# Patient Record
Sex: MALE | Race: WHITE | NOT HISPANIC OR LATINO | Employment: OTHER | URBAN - METROPOLITAN AREA
[De-identification: names, ages, dates, MRNs, and addresses within clinical notes are randomized per-mention and may not be internally consistent; named-entity substitution may affect disease eponyms.]

---

## 2017-01-17 ENCOUNTER — OFFICE VISIT (OUTPATIENT)
Dept: MEDICAL GROUP | Age: 72
End: 2017-01-17
Payer: MEDICARE

## 2017-01-17 VITALS
RESPIRATION RATE: 16 BRPM | BODY MASS INDEX: 19.35 KG/M2 | SYSTOLIC BLOOD PRESSURE: 124 MMHG | HEART RATE: 80 BPM | HEIGHT: 75 IN | TEMPERATURE: 98.1 F | WEIGHT: 155.6 LBS | DIASTOLIC BLOOD PRESSURE: 70 MMHG | OXYGEN SATURATION: 98 %

## 2017-01-17 DIAGNOSIS — E78.2 MIXED HYPERLIPIDEMIA: ICD-10-CM

## 2017-01-17 DIAGNOSIS — F17.200 TOBACCO DEPENDENCE: ICD-10-CM

## 2017-01-17 DIAGNOSIS — M15.9 PRIMARY OSTEOARTHRITIS INVOLVING MULTIPLE JOINTS: ICD-10-CM

## 2017-01-17 DIAGNOSIS — R73.01 IFG (IMPAIRED FASTING GLUCOSE): ICD-10-CM

## 2017-01-17 PROCEDURE — 99214 OFFICE O/P EST MOD 30 MIN: CPT | Performed by: INTERNAL MEDICINE

## 2017-01-17 ASSESSMENT — ENCOUNTER SYMPTOMS
PSYCHIATRIC NEGATIVE: 1
EYES NEGATIVE: 1
NEUROLOGICAL NEGATIVE: 1
CARDIOVASCULAR NEGATIVE: 1
GASTROINTESTINAL NEGATIVE: 1
RESPIRATORY NEGATIVE: 1
CONSTITUTIONAL NEGATIVE: 1

## 2017-01-18 NOTE — PROGRESS NOTES
Subjective:      Alvaro Metcalf Jr. is a 71 y.o. male who presents with Arthritis  The patient is here for followup of chronic medical problems listed below. The patient is compliant with medications and having no side effects from them. Denies chest pain, abdominal pain, dyspnea, myalgias, or cough.   Patient Active Problem List    Diagnosis Date Noted   • Primary osteoarthritis involving multiple joints 01/17/2017   • Tobacco dependence 01/17/2017   • Smoking greater than 30 pack years (2/3 pack a day for 52 yrs= 34 pk yrs) 10/17/2016   • IFG (impaired fasting glucose) 04/12/2016   • Vitamin D deficiency 04/12/2016   • Cervical spondylosis- dr white 02/25/2016   • Mixed hyperlipidemia 09/14/2015   • Insomnia 09/14/2015   • Empty sella syndrome (CMS-HCC) 10/30/2013   • Hypogonadism male 10/30/2013   • Burning sensation 10/07/2013   • S/P cervical spinal fusion 10/07/2013   • Cerebral palsy (CMS-HCC) 08/28/2012   • Chronic neck pain- S/P cervical spinal fusion- followed by pain mgt- dr white 07/20/2012     Allergies   Allergen Reactions   • Tetanus Toxoid Anaphylaxis   • Crestor [Rosuvastatin]      Gi upset and myalgias     Outpatient Prescriptions Prior to Visit   Medication Sig Dispense Refill   • atorvastatin (LIPITOR) 40 MG Tab Take 1 Tab by mouth every 48 hours. 45 Tab 4   • amitriptyline (ELAVIL) 50 MG Tab One bid. 180 Tab 3   • pregabalin (LYRICA) 100 MG Cap Take 1 Cap by mouth 2 times a day. 180 Cap 3   • methocarbamol (ROBAXIN) 750 MG TABS Take 1 Tab by mouth 3 times a day. 90 Tab 3   • lorazepam (ATIVAN) 1 MG TABS Take 1 Tab by mouth 2 Times a Day. One bid. 60 Tab 3   • hydrocodone/acetaminophen (NORCO)  MG TABS Take 1 Tab by mouth every 8 hours as needed. May fill on or after 7/23/2015. 90 Tab 0   • aspirin (ASA) 325 MG TABS Take 325 mg by mouth every day.     • amoxicillin-clavulanate (AUGMENTIN) 875-125 MG Tab Take 1 Tab by mouth 2 times a day. 30 Tab 1   • baclofen (LIORESAL) 10 MG Tab  "       No facility-administered medications prior to visit.              Arthritis        Review of Systems   Constitutional: Negative.    HENT: Negative.    Eyes: Negative.    Respiratory: Negative.    Cardiovascular: Negative.    Gastrointestinal: Negative.    Genitourinary: Negative.    Musculoskeletal: Positive for arthritis.   Skin: Negative.    Neurological: Negative.    Endo/Heme/Allergies: Negative.    Psychiatric/Behavioral: Negative.           Objective:     /70 mmHg  Pulse 80  Temp(Src) 36.7 °C (98.1 °F)  Resp 16  Ht 1.905 m (6' 3\")  Wt 70.58 kg (155 lb 9.6 oz)  BMI 19.45 kg/m2  SpO2 98%     Physical Exam   Constitutional: He is oriented to person, place, and time. He appears well-developed and well-nourished. No distress.   HENT:   Head: Normocephalic and atraumatic.   Right Ear: External ear normal.   Left Ear: External ear normal.   Mouth/Throat: Oropharynx is clear and moist. No oropharyngeal exudate.   Eyes: Conjunctivae and EOM are normal. Pupils are equal, round, and reactive to light. Right eye exhibits no discharge.   Neck: Normal range of motion. Neck supple. No JVD present. No tracheal deviation present. No thyromegaly present.   Cardiovascular: Normal rate, regular rhythm, normal heart sounds and intact distal pulses.  Exam reveals no gallop.    Pulmonary/Chest: Effort normal and breath sounds normal. No respiratory distress. He has no wheezes. He has no rales. He exhibits no tenderness.   Abdominal: Soft. Bowel sounds are normal. He exhibits no distension and no mass. There is no tenderness. There is no rebound and no guarding. No hernia.   Genitourinary: Guaiac negative stool. No penile tenderness.   Musculoskeletal: He exhibits no edema or tenderness.   Lymphadenopathy:     He has no cervical adenopathy.   Neurological: He is alert and oriented to person, place, and time. He has normal reflexes. He displays normal reflexes. No cranial nerve deficit. He exhibits normal muscle " tone. Coordination normal.   Skin: Skin is warm and dry. No rash noted. He is not diaphoretic. No erythema. No pallor.   Psychiatric: He has a normal mood and affect. His behavior is normal. Judgment and thought content normal.   Nursing note and vitals reviewed.  No visits with results within 1 Month(s) from this visit.  Latest known visit with results is:    Hospital Outpatient Visit on 10/11/2016   Component Date Value   • Sodium 10/11/2016 140    • Potassium 10/11/2016 4.1    • Chloride 10/11/2016 106    • Co2 10/11/2016 27    • Anion Gap 10/11/2016 7.0    • Glucose 10/11/2016 105*   • Bun 10/11/2016 26*   • Creatinine 10/11/2016 0.88    • Calcium 10/11/2016 9.2    • AST(SGOT) 10/11/2016 27    • ALT(SGPT) 10/11/2016 19    • Alkaline Phosphatase 10/11/2016 56    • Total Bilirubin 10/11/2016 0.8    • Albumin 10/11/2016 4.2    • Total Protein 10/11/2016 7.3    • Globulin 10/11/2016 3.1    • A-G Ratio 10/11/2016 1.4    • Cholesterol,Tot 10/11/2016 136    • Triglycerides 10/11/2016 202*   • HDL 10/11/2016 39*   • LDL 10/11/2016 57    • WBC 10/11/2016 6.1    • RBC 10/11/2016 5.20    • Hemoglobin 10/11/2016 16.0    • Hematocrit 10/11/2016 47.8    • MCV 10/11/2016 91.9    • MCH 10/11/2016 30.8    • MCHC 10/11/2016 33.5*   • RDW 10/11/2016 43.5    • Platelet Count 10/11/2016 153*   • MPV 10/11/2016 10.2    • Neutrophils-Polys 10/11/2016 60.70    • Lymphocytes 10/11/2016 26.90    • Monocytes 10/11/2016 7.60    • Eosinophils 10/11/2016 3.10    • Basophils 10/11/2016 1.20    • Immature Granulocytes 10/11/2016 0.50    • Nucleated RBC 10/11/2016 0.00    • Neutrophils (Absolute) 10/11/2016 3.69    • Lymphs (Absolute) 10/11/2016 1.63    • Monos (Absolute) 10/11/2016 0.46    • Eos (Absolute) 10/11/2016 0.19    • Baso (Absolute) 10/11/2016 0.07    • Immature Granulocytes (a* 10/11/2016 0.03    • NRBC (Absolute) 10/11/2016 0.00    • 25-Hydroxy   Vitamin D 25 10/11/2016 41    • GFR If  10/11/2016 >60    • GFR If Non   [employer]i* 10/11/2016 >60       Lab Results   Component Value Date/Time    GLYCOHEMOGLOBIN 5.8* 06/11/2015 11:13 AM    GLYCOHEMOGLOBIN 5.8* 03/05/2014 08:34 AM     Lab Results   Component Value Date/Time    SODIUM 140 10/11/2016 11:17 AM    POTASSIUM 4.1 10/11/2016 11:17 AM    CHLORIDE 106 10/11/2016 11:17 AM    CO2 27 10/11/2016 11:17 AM    GLUCOSE 105* 10/11/2016 11:17 AM    BUN 26* 10/11/2016 11:17 AM    CREATININE 0.88 10/11/2016 11:17 AM    BUN-CREATININE RATIO 19 03/07/2011 12:00 AM    GLOM FILT RATE, EST >59 03/07/2011 12:00 AM    ALKALINE PHOSPHATASE 56 10/11/2016 11:17 AM    AST(SGOT) 27 10/11/2016 11:17 AM    ALT(SGPT) 19 10/11/2016 11:17 AM    TOTAL BILIRUBIN 0.8 10/11/2016 11:17 AM     Lab Results   Component Value Date/Time    INR 1.02 05/04/2010 02:28 PM    INR 1.06 11/25/2009 05:45 AM     Lab Results   Component Value Date/Time    CHOLESTEROL, 10/11/2016 11:17 AM    LDL 57 10/11/2016 11:17 AM    HDL 39* 10/11/2016 11:17 AM    TRIGLYCERIDES 202* 10/11/2016 11:17 AM       Lab Results   Component Value Date/Time    TESTOSTERONE,TOTAL 285* 03/05/2014 08:34 AM     No results found for: TSH  Lab Results   Component Value Date/Time    FREE T-4 0.82 11/07/2013 07:19 AM    FREE T-4 0.71 11/25/2009 11:15 AM     No results found for: URICACID  No components found for: VITB12  Lab Results   Component Value Date/Time    25-HYDROXY   VITAMIN D 25 41 10/11/2016 11:22 AM    25-HYDROXY   VITAMIN D 25 36 05/25/2012 09:40 AM                  Assessment/Plan:     1. Primary osteoarthritis involving multiple joints    Under good control. Continue same regimen.    2. Mixed hyperlipidemia    Under good control. Continue same regimen.  3. IFG (impaired fasting glucose)     Under good control. Continue same regimen.    4. Tobacco dependence     Patient counseled. Encouraged to quit tobacco products. NicoDerm prescribed.    30 minute face-to-face encounter took place today.  More than half of this time was spent in  the coordination of care of the above problems, as well as counseling.

## 2017-03-27 ENCOUNTER — PATIENT OUTREACH (OUTPATIENT)
Dept: HEALTH INFORMATION MANAGEMENT | Facility: OTHER | Age: 72
End: 2017-03-27

## 2017-04-05 NOTE — PROGRESS NOTES
Attempt #:3    Verify PCP: yes    Communication Preference Obtained: yes     Review Care Team: yes    Annual Wellness Visit Scheduling  1. Scheduling Status:Not Scheduled. Patient states they are not interested          Care Gap Scheduling (Attempt to Schedule EACH Overdue Care Gap!)     Health Maintenance Due   Topic Date Due   • IMM ZOSTER VACCINE  DIDN'T GET TO DISCUSS WITH PT          MyChart Activation: declined  MyChart Shiva: no  Virtual Visits: no  Opt In to Text Messages: no

## 2017-04-06 NOTE — PROGRESS NOTES
Attempt #:4    Verify PCP: yes    Communication Preference Obtained: yes     Review Care Team: yes    Annual Wellness Visit Scheduling  1. Scheduling Status:Scheduled          Care Gap Scheduling (Attempt to Schedule EACH Overdue Care Gap!)     Health Maintenance Due   Topic Date Due   • IMM ZOSTER VACCINE  PT WOULD LIKE TO DISCUSS WITH PCP         Darryl Activation: declined  MyChart Shiva: no  Virtual Visits: no  Opt In to Text Messages: no

## 2017-04-20 ENCOUNTER — TELEPHONE (OUTPATIENT)
Dept: MEDICAL GROUP | Age: 72
End: 2017-04-20

## 2017-04-20 NOTE — TELEPHONE ENCOUNTER
ANNUAL WELLNESS VISIT PRE-VISIT PLANNING     1.  Reviewed note from last office visit with PCP: YES    2.  If any orders were placed at last visit, do we have Results/Consult Notes?        •  Labs - Labs ordered, completed and results are in chart.       •  Imaging - Imaging ordered, completed and results are in chart.       •  Referrals - Referral ordered, patient was seen and consult notes are in chart. Care Teams updated  YES.    3.  Immunizations were updated in [facility] using WebIZ?: Yes       •  WebIZ Recommendations: HEPATITIS A , HEPATITIS B, TDAP and ZOSTAVAX (Shingles)       •  Is patient due for Tdap? YES. Patient was notified of copay.       •  Is patient due for Shingles? YES. Patient was notified of copay.     4.  Patient is due for the following Health Maintenance Topics:   Health Maintenance Due   Topic Date Due   • IMM ZOSTER VACCINE  04/12/2004   • IMM PNEUMOCOCCAL 65+ (ADULT) LOW/MEDIUM RISK SERIES (2 of 2 - PPSV23) 04/12/2017   • Annual Wellness Visit  04/13/2017       - Patient has completed FLU, PNEUMOVAX (PPSV23) and PREVNAR (PCV13)  Immunization(s) per WebIZ. Chart has been updated.    5.  Reviewed/Updated the following with patient:       •   Preferred Pharmacy? YES       •   Preferred Lab? YES       •   Medications? YES. Was Abstract Encounter opened and chart updated? YES       •   Social History? YES. Was Abstract Encounter opened and chart updated? YES       •   Family History? YES. Was Abstract Encounter opened and chart updated? YES    6.  Care Team Updated:       •   DME Company (gait device, O2, CPAP, etc.): N\A       •   Other Specialists (eye doctor, derm, GYN, cardiology, endo, etc): YES    7.  Patient has the following Care Path diagnoses on Problem List:      8.  Specialty Comments was updated with diagnosis information provided by [facility]: YES    9.  Patient was advised: “This is a free wellness visit. The provider will screen for medical conditions to help you stay healthy. If you  have other concerns to address you may be asked to discuss these at a separate visit or there may be an additional fee.”     10.  Patient was informed to arrive 15 min prior to their scheduled appointment and bring in their medication bottles?  YES

## 2017-04-24 ENCOUNTER — HOSPITAL ENCOUNTER (OUTPATIENT)
Dept: LAB | Facility: MEDICAL CENTER | Age: 72
End: 2017-04-24
Attending: INTERNAL MEDICINE
Payer: MEDICARE

## 2017-04-24 DIAGNOSIS — E78.2 MIXED HYPERLIPIDEMIA: ICD-10-CM

## 2017-04-24 LAB
ALBUMIN SERPL BCP-MCNC: 4.1 G/DL (ref 3.2–4.9)
ALBUMIN/GLOB SERPL: 1.5 G/DL
ALP SERPL-CCNC: 61 U/L (ref 30–99)
ALT SERPL-CCNC: 16 U/L (ref 2–50)
ANION GAP SERPL CALC-SCNC: 6 MMOL/L (ref 0–11.9)
AST SERPL-CCNC: 22 U/L (ref 12–45)
BASOPHILS # BLD AUTO: 1 % (ref 0–1.8)
BASOPHILS # BLD: 0.07 K/UL (ref 0–0.12)
BILIRUB SERPL-MCNC: 0.8 MG/DL (ref 0.1–1.5)
BUN SERPL-MCNC: 29 MG/DL (ref 8–22)
CALCIUM SERPL-MCNC: 8.8 MG/DL (ref 8.5–10.5)
CHLORIDE SERPL-SCNC: 106 MMOL/L (ref 96–112)
CHOLEST SERPL-MCNC: 149 MG/DL (ref 100–199)
CO2 SERPL-SCNC: 26 MMOL/L (ref 20–33)
CREAT SERPL-MCNC: 0.82 MG/DL (ref 0.5–1.4)
EOSINOPHIL # BLD AUTO: 0.23 K/UL (ref 0–0.51)
EOSINOPHIL NFR BLD: 3.2 % (ref 0–6.9)
ERYTHROCYTE [DISTWIDTH] IN BLOOD BY AUTOMATED COUNT: 42.5 FL (ref 35.9–50)
GFR SERPL CREATININE-BSD FRML MDRD: >60 ML/MIN/1.73 M 2
GLOBULIN SER CALC-MCNC: 2.8 G/DL (ref 1.9–3.5)
GLUCOSE SERPL-MCNC: 104 MG/DL (ref 65–99)
HCT VFR BLD AUTO: 46.6 % (ref 42–52)
HDLC SERPL-MCNC: 41 MG/DL
HGB BLD-MCNC: 15.7 G/DL (ref 14–18)
IMM GRANULOCYTES # BLD AUTO: 0.02 K/UL (ref 0–0.11)
IMM GRANULOCYTES NFR BLD AUTO: 0.3 % (ref 0–0.9)
LDLC SERPL CALC-MCNC: 64 MG/DL
LYMPHOCYTES # BLD AUTO: 1.94 K/UL (ref 1–4.8)
LYMPHOCYTES NFR BLD: 27.2 % (ref 22–41)
MCH RBC QN AUTO: 30 PG (ref 27–33)
MCHC RBC AUTO-ENTMCNC: 33.7 G/DL (ref 33.7–35.3)
MCV RBC AUTO: 88.9 FL (ref 81.4–97.8)
MONOCYTES # BLD AUTO: 0.54 K/UL (ref 0–0.85)
MONOCYTES NFR BLD AUTO: 7.6 % (ref 0–13.4)
NEUTROPHILS # BLD AUTO: 4.32 K/UL (ref 1.82–7.42)
NEUTROPHILS NFR BLD: 60.7 % (ref 44–72)
NRBC # BLD AUTO: 0 K/UL
NRBC BLD AUTO-RTO: 0 /100 WBC
PLATELET # BLD AUTO: 156 K/UL (ref 164–446)
PMV BLD AUTO: 10.5 FL (ref 9–12.9)
POTASSIUM SERPL-SCNC: 3.8 MMOL/L (ref 3.6–5.5)
PROT SERPL-MCNC: 6.9 G/DL (ref 6–8.2)
RBC # BLD AUTO: 5.24 M/UL (ref 4.7–6.1)
SODIUM SERPL-SCNC: 138 MMOL/L (ref 135–145)
TRIGL SERPL-MCNC: 220 MG/DL (ref 0–149)
WBC # BLD AUTO: 7.1 K/UL (ref 4.8–10.8)

## 2017-04-24 PROCEDURE — 80053 COMPREHEN METABOLIC PANEL: CPT

## 2017-04-24 PROCEDURE — 85025 COMPLETE CBC W/AUTO DIFF WBC: CPT

## 2017-04-24 PROCEDURE — 36415 COLL VENOUS BLD VENIPUNCTURE: CPT

## 2017-04-24 PROCEDURE — 80061 LIPID PANEL: CPT

## 2017-04-27 ENCOUNTER — OFFICE VISIT (OUTPATIENT)
Dept: MEDICAL GROUP | Age: 72
End: 2017-04-27
Payer: MEDICARE

## 2017-04-27 VITALS
OXYGEN SATURATION: 95 % | WEIGHT: 155 LBS | HEIGHT: 75 IN | TEMPERATURE: 97.4 F | BODY MASS INDEX: 19.27 KG/M2 | SYSTOLIC BLOOD PRESSURE: 118 MMHG | HEART RATE: 69 BPM | DIASTOLIC BLOOD PRESSURE: 78 MMHG

## 2017-04-27 DIAGNOSIS — R20.8 BURNING SENSATION: ICD-10-CM

## 2017-04-27 DIAGNOSIS — F17.200 TOBACCO DEPENDENCE: ICD-10-CM

## 2017-04-27 DIAGNOSIS — M15.9 PRIMARY OSTEOARTHRITIS INVOLVING MULTIPLE JOINTS: ICD-10-CM

## 2017-04-27 DIAGNOSIS — R73.01 IFG (IMPAIRED FASTING GLUCOSE): ICD-10-CM

## 2017-04-27 DIAGNOSIS — Z23 NEED FOR VACCINATION: ICD-10-CM

## 2017-04-27 DIAGNOSIS — F17.210 SMOKING GREATER THAN 30 PACK YEARS: ICD-10-CM

## 2017-04-27 DIAGNOSIS — G89.29 CHRONIC NECK PAIN: ICD-10-CM

## 2017-04-27 DIAGNOSIS — G80.9 CEREBRAL PALSY, UNSPECIFIED (HCC): ICD-10-CM

## 2017-04-27 DIAGNOSIS — E78.2 MIXED HYPERLIPIDEMIA: ICD-10-CM

## 2017-04-27 DIAGNOSIS — M47.812 SPONDYLOSIS OF CERVICAL REGION WITHOUT MYELOPATHY OR RADICULOPATHY: ICD-10-CM

## 2017-04-27 DIAGNOSIS — Z00.00 MEDICARE ANNUAL WELLNESS VISIT, SUBSEQUENT: ICD-10-CM

## 2017-04-27 DIAGNOSIS — E55.9 VITAMIN D DEFICIENCY: ICD-10-CM

## 2017-04-27 DIAGNOSIS — F51.01 PRIMARY INSOMNIA: ICD-10-CM

## 2017-04-27 DIAGNOSIS — E29.1 HYPOGONADISM MALE: ICD-10-CM

## 2017-04-27 DIAGNOSIS — Z98.1 S/P CERVICAL SPINAL FUSION: ICD-10-CM

## 2017-04-27 DIAGNOSIS — E23.6 EMPTY SELLA SYNDROME (HCC): ICD-10-CM

## 2017-04-27 DIAGNOSIS — M54.2 CHRONIC NECK PAIN: ICD-10-CM

## 2017-04-27 PROCEDURE — G0009 ADMIN PNEUMOCOCCAL VACCINE: HCPCS | Performed by: INTERNAL MEDICINE

## 2017-04-27 PROCEDURE — G0439 PPPS, SUBSEQ VISIT: HCPCS | Mod: 25 | Performed by: INTERNAL MEDICINE

## 2017-04-27 PROCEDURE — 90732 PPSV23 VACC 2 YRS+ SUBQ/IM: CPT | Performed by: INTERNAL MEDICINE

## 2017-04-27 RX ORDER — ZOLPIDEM TARTRATE 10 MG/1
TABLET ORAL
COMMUNITY
Start: 2017-04-15 | End: 2020-01-01

## 2017-04-27 ASSESSMENT — PATIENT HEALTH QUESTIONNAIRE - PHQ9: CLINICAL INTERPRETATION OF PHQ2 SCORE: 0

## 2017-04-27 NOTE — MR AVS SNAPSHOT
"        Alvaro Metcalf JrMadeleine   2017 11:00 AM   Office Visit   MRN: [id number]    Department:  [address] [place] [address]   Dept Phone:  [phone]    Description:  Male : 1945   Provider:  Chris Hinkle M.D.; [facility]            Reason for Visit     Annual Wellness Visit           Allergies as of 2017     Allergen Noted Reactions    Tetanus Toxoid 2009   Anaphylaxis    Crestor [Rosuvastatin] 2015       Gi upset and myalgias      You were diagnosed with     Medicare annual wellness visit, subsequent   [384286]       Need for vaccination   [617252]       Chronic neck pain   [534849]       Cerebral palsy, unspecified (CMS-HCC)   [7683052]       Burning sensation   [772990]       S/P cervical spinal fusion   [532876]       Empty sella syndrome (CMS-HCC)   [831837]       Hypogonadism male   [087959]       Mixed hyperlipidemia   [272.2.ICD-9-CM]       Primary insomnia   [708469]       Spondylosis of cervical region without myelopathy or radiculopathy   [566554]       IFG (impaired fasting glucose)   [156802]       Vitamin D deficiency   [5228491]       Smoking greater than 30 pack years   [989741]       Primary osteoarthritis involving multiple joints   [3296671]       Tobacco dependence   [661104]         Vital Signs     Blood Pressure Pulse Temperature Height Weight Body Mass Index    118/78 mmHg 69 36.3 °C (97.4 °F) 1.905 m (6' 3\") 70.308 kg (155 lb) 19.37 kg/m2    Oxygen Saturation Smoking Status                95% Current Every Day Smoker          Basic Information     Date Of Birth Sex Race Ethnicity Preferred Language    1945 Male White Non- English      Your appointments     Oct 26, 2017  1:00 PM   Urgent/Same Day with Chris Hinkle M.D.   [address] [address] [phone]           You will be receiving a confirmation call a few days before your appointment from our automated call " confirmation system.              Problem List              ICD-10-CM Priority Class Noted - Resolved    Chronic neck pain- S/P cervical spinal fusion- followed by pain mgt- dr white M54.2, G89.29   7/20/2012 - Present    Cerebral palsy, unspecified (CMS-HCC) G80.9   8/28/2012 - Present    Burning sensation R20.8   10/7/2013 - Present    S/P cervical spinal fusion Z98.1   10/7/2013 - Present    Empty sella syndrome (CMS-HCC) E23.6   10/30/2013 - Present    Hypogonadism male E29.1   10/30/2013 - Present    Mixed hyperlipidemia E78.2   9/14/2015 - Present    Primary insomnia F51.01   9/14/2015 - Present    Spondylosis of cervical region without myelopathy or radiculopathy M47.812   2/25/2016 - Present    IFG (impaired fasting glucose) R73.01   4/12/2016 - Present    Vitamin D deficiency E55.9   4/12/2016 - Present    Smoking greater than 30 pack years (2/3 pack a day for 52 yrs= 34 pk yrs) F17.210   10/17/2016 - Present    Primary osteoarthritis involving multiple joints M15.0   1/17/2017 - Present    Tobacco dependence F17.200   1/17/2017 - Present      Health Maintenance        Date Due Completion Dates    IMM ZOSTER VACCINE 4/12/2004 ---    IMM PNEUMOCOCCAL 65+ (ADULT) LOW/MEDIUM RISK SERIES (2 of 2 - PPSV23) 4/12/2017 4/12/2016    COLONOSCOPY 1/21/2019 1/21/2014            Current Immunizations     13-VALENT PCV PREVNAR 4/12/2016    Influenza Vaccine Adult HD 9/19/2016, 9/22/2015    Influenza Vaccine Quad Inj (Pf) 10/6/2014  2:39 PM    Pneumococcal polysaccharide vaccine (PPSV-23) 4/27/2017 12:12 PM      Below and/or attached are the medications your provider expects you to take. Review all of your home medications and newly ordered medications with your provider and/or pharmacist. Follow medication instructions as directed by your provider and/or pharmacist. Please keep your medication list with you and share with your provider. Update the information when medications are discontinued, doses are changed, or new  medications (including over-the-counter products) are added; and carry medication information at all times in the event of emergency situations     Allergies:  TETANUS TOXOID - Anaphylaxis     CRESTOR - (reactions not documented)               Medications  Valid as of: April 27, 2017 -  2:47 PM    Generic Name Brand Name Tablet Size Instructions for use    Amitriptyline HCl (Tab) ELAVIL 50 MG One bid.        Aspirin (Tab)  MG Take 325 mg by mouth every day.        Atorvastatin Calcium (Tab) LIPITOR 40 MG Take 1 Tab by mouth every 48 hours.        Baclofen (Tab) LIORESAL 10 MG         Hydrocodone-Acetaminophen (Tab) NORCO  MG Take 1 Tab by mouth every 8 hours as needed. May fill on or after 7/23/2015.        LORazepam (Tab) ATIVAN 1 MG Take 1 Tab by mouth 2 Times a Day. One bid.        Methocarbamol (Tab) ROBAXIN 750 MG Take 1 Tab by mouth 3 times a day.        Pregabalin (Cap) LYRICA 100 MG Take 1 Cap by mouth 2 times a day.        Zolpidem Tartrate (Tab) AMBIEN 10 MG         .                 Medicines prescribed today were sent to:     [id number] - [place], [place] - [address]    [address] [place] [address]    Phone: [phone] Fax: [phone]    Open 24 Hours?: No      Medication refill instructions:       If your prescription bottle indicates you have medication refills left, it is not necessary to call your provider’s office. Please contact your pharmacy and they will refill your medication.    If your prescription bottle indicates you do not have any refills left, you may request refills at any time through one of the following ways: The online [employer] system (except Urgent Care), by calling your provider’s office, or by asking your pharmacy to contact your provider’s office with a refill request. Medication refills are processed only during regular business hours and may not be available until the next business day. Your provider may request additional information or to have  a follow-up visit with you prior to refilling your medication.   *Please Note: Medication refills are assigned a new Rx number when refilled electronically. Your pharmacy may indicate that no refills were authorized even though a new prescription for the same medication is available at the pharmacy. Please request the medicine by name with the pharmacy before contacting your provider for a refill.        Other Notes About Your Plan     G80.9 Cerebral Palsy, E23.7 Empty Sella Syndrome  Query: If BMI<18 please consider dx of Cachexia           MyChart Status: Patient Declined        Quit Tobacco Information     Do you want to quit using tobacco?    Quitting tobacco decreases risks of cancer, heart and lung disease, increases life expectancy, improves sense of taste and smell, and increases spending money, among other benefits.    If you are thinking about quitting, we can help.  • [employer] Quit Tobacco Program: [phone]  o Program occurs weekly for four weeks and includes pharmacist consultation on products to support quitting smoking or chewing tobacco. A provider referral is needed for pharmacist consultation.  • Tobacco Users Help Hotline: 5-206-QUITNOW (305-2007) or https://[place].quitlogix.org/  o Free, confidential telephone and online coaching for [place] residents. Sessions are designed on a schedule that is convenient for you. Eligible clients receive free nicotine replacement therapy.  • Nationally: www.smokefree.gov  o Information and professional assistance to support both immediate and long-term needs as you become, and remain, a non-smoker. Smokefree.gov allows you to choose the help that best fits your needs.

## 2017-04-27 NOTE — PROGRESS NOTES
Chief Complaint   Patient presents with   • Annual Wellness Visit         HPI:  Alvaro Metcalf Jr. is a 72 y.o. male here for Medicare Annual Wellness Visit         Patient Active Problem List    Diagnosis Date Noted   • Primary osteoarthritis involving multiple joints 01/17/2017   • Tobacco dependence 01/17/2017   • Smoking greater than 30 pack years (2/3 pack a day for 52 yrs= 34 pk yrs) 10/17/2016   • IFG (impaired fasting glucose) 04/12/2016   • Vitamin D deficiency 04/12/2016   • Cervical spondylosis- dr white 02/25/2016   • Mixed hyperlipidemia 09/14/2015   • Insomnia 09/14/2015   • Empty sella syndrome (CMS-HCC) 10/30/2013   • Hypogonadism male 10/30/2013   • Burning sensation 10/07/2013   • S/P cervical spinal fusion 10/07/2013   • Cerebral palsy (CMS-HCC) 08/28/2012   • Chronic neck pain- S/P cervical spinal fusion- followed by pain mgt- dr white 07/20/2012       Current Outpatient Prescriptions   Medication Sig Dispense Refill   • zolpidem (AMBIEN) 10 MG Tab      • atorvastatin (LIPITOR) 40 MG Tab Take 1 Tab by mouth every 48 hours. 45 Tab 4   • amitriptyline (ELAVIL) 50 MG Tab One bid. 180 Tab 3   • pregabalin (LYRICA) 100 MG Cap Take 1 Cap by mouth 2 times a day. 180 Cap 3   • methocarbamol (ROBAXIN) 750 MG TABS Take 1 Tab by mouth 3 times a day. 90 Tab 3   • lorazepam (ATIVAN) 1 MG TABS Take 1 Tab by mouth 2 Times a Day. One bid. 60 Tab 3   • hydrocodone/acetaminophen (NORCO)  MG TABS Take 1 Tab by mouth every 8 hours as needed. May fill on or after 7/23/2015. 90 Tab 0   • aspirin (ASA) 325 MG TABS Take 325 mg by mouth every day.     • baclofen (LIORESAL) 10 MG Tab        No current facility-administered medications for this visit.        Patient is taking medications as noted in medication list.  Current supplements as per medication list.   Chronic narcotic pain medicines: no    Allergies: Tetanus toxoid and Crestor    Current social contact/activities: Sports fan, going to [employer] 3  day a week, movies once a week.       Is patient current with immunizations?  No, due for PNEUMOVAX (PPSV23) and ZOSTAVAX (Shingles). Patient is interested in receiving PNEUMOVAX (PPSV23) today.     He  reports that he has been smoking Cigarettes.  He has a 40.5 pack-year smoking history. He has never used smokeless tobacco. He reports that he drinks alcohol. He reports that he does not use illicit drugs.  Ready to quit: No  Counseling given: Yes        DPA/Advanced Directive:  Patient has Durable Power of , but it is not on file. Instructed to bring in a copy to scan into their chart.    ROS:    Gait: Uses no assistive device    Ostomy: no    Other tubes: no    Amputations: no    Chronic oxygen use: no    Last eye exam: 8/2016    Wears hearing aids: no    : Denies incontinence.        Depression Screening    Little interest or pleasure in doing things?  0 - not at all  Feeling down, depressed, or hopeless?  0 - not at all  Patient Health Questionnaire Score: 0  If depressive symptoms identified deferred to follow up visit unless specifically addressed in assessment and plan.    Screening for Cognitive Impairment    Three Minute Recall (banana, sunrise, fence)  3/3 Apple, magen, table  Draw clock face with all 12 numbers set to the hand to show 10 minutes past 11 o'clock  1 5/5  If cognitive concerns identified deferred to follow up visit unless specifically addressed in assessment and plan.    Fall Risk Assessment    Has the patient had two or more falls in the last year or any fall with injury in the last year?  No  If Fall Risk identified deferred to follow up visit unless specifically addressed in assessment and plan.    Safety Assessment    Throw rugs on floor.  Yes  Handrails on all stairs.  Yes  Good lighting in all hallways.  Yes  Difficulty hearing.  No  Patient counseled about all safety risks that were identified.    Functional Assessment ADLs    Are there any barriers preventing you from  cooking for yourself or meeting nutritional needs?  No.    Are there any barriers preventing you from driving safely or obtaining transportation?  No.    Are there any barriers preventing you from using a telephone or calling for help?  No.    Are there any barriers preventing you from shopping?  No.    Are there any barriers preventing you from taking care of your own finances?  No.    Are there any barriers preventing you from managing your medications?  No.    Are currently engaging any exercise or physical activity?  Yes.  Walking 3 times a week.    Health Maintenance Summary                IMM ZOSTER VACCINE Overdue 4/12/2004     IMM PNEUMOCOCCAL 65+ (ADULT) LOW/MEDIUM RISK SERIES Overdue 4/12/2017      Done 4/12/2016 Imm Admin: Pneumococcal Conjugate Vaccine (Prevnar/PCV-13)    Annual Wellness Visit Overdue 4/13/2017      Done 4/12/2016 Visit Dx: Medicare annual wellness visit, subsequent     Patient has more history with this topic...    LUNG CANCER SCREENING Next Due 11/1/2017      Done 11/1/2016 CT-LUNG CANCER-SCREENING     Patient has more history with this topic...    COLONOSCOPY Next Due 1/21/2019      Done 1/21/2014 AMB REFERRAL TO GI FOR COLONOSCOPY          Patient Care Team:  Chris Hinkle M.D. as PCP - General (Internal Medicine)  Chris Flores M.D. as Consulting Physician (Ophthalmology)  Handy Reynoso M.D. as Consulting Physician (Pain Management)    Social History   Substance Use Topics   • Smoking status: Current Every Day Smoker -- 0.75 packs/day for 54 years     Types: Cigarettes   • Smokeless tobacco: Never Used      Comment: 1/2 pk a day for 45 yrs   • Alcohol Use: 0.0 oz/week     0 Standard drinks or equivalent per week      Comment: very rarely     Family History   Problem Relation Age of Onset   • Heart Attack Father    • Cancer Mother      intestinal cancer     He  has a past medical history of Arthritic-like pain; Elevated cholesterol; Cerebral palsy (CMS-HCC); Arthritis;  "Arrhythmia; Renal disorder; CAD (coronary artery disease) (7/20/2012); Hyperlipidemia (7/20/2012); Chronic neck pain (7/20/2012); Fatigue (7/20/2012); Dyspnea (7/20/2012); and Cold feeling.   Past Surgical History   Procedure Laterality Date   • Tonsillectomy     • Cervical disk and fusion anterior  1990   • Hand surgery  1996     left hand   • Cervical disk and fusion anterior  5/12/2010     Performed by VASHTI FULLER at [facility]   • Pr inj dx/ther agnt paravert facet joint, ce* Right 2/25/2016     Procedure: INJ PARA FACET CT/ 1 LVL W/IG - C2, 3, 4, 5;  Surgeon: Handy Reynoso M.D.;  Location: [facility];  Service: Pain Management   • Pr inj dx/ther agnt paravert facet joint, ce*  2/25/2016     Procedure: INJ PARA FACET C/T 2D LVL W/IG ;  Surgeon: Handy Reynoso M.D.;  Location: [facility];  Service: Pain Management   • Pr inj dx/ther agnt paravert facet joint, ce*  2/25/2016     Procedure: INJ PARA FACET C/T 3D LVL W/IG;  Surgeon: Handy Reynoso M.D.;  Location: [facility];  Service: Pain Management   • Cataract extraction with iol Bilateral      one eye  10 years ago and the other eye 20 years           Exam:     Blood pressure 118/78, pulse 69, temperature 36.3 °C (97.4 °F), height 1.905 m (6' 3\"), weight 70.308 kg (155 lb), SpO2 95 %. Body mass index is 19.37 kg/(m^2).    Hearing excellent.    Dentition good   Alert, oriented in no acute distress.  Eye contact is good, speech goal directed, affect calm         Assessment and Plan. The following treatment and monitoring plan is recommended:      1. Need for vaccination  Pneumococal Polysaccharide Vaccine 23-Valent =>1yo SQ/IM   1. Medicare annual wellness visit, subsequent     - Annual Wellness Visit - Includes PPPS Subsequent ()    2. Need for vaccination     - Pneumococal Polysaccharide Vaccine 23-Valent =>1yo SQ/IM  - Annual Wellness Visit - Includes PPPS Subsequent ()    3. Chronic " neck pain- S/P cervical spinal fusion- followed by pain mgt- dr white   Under good control. Continue same regimen.     - Annual Wellness Visit - Includes PPPS Subsequent ()    4. Cerebral palsy, unspecified (CMS-HCC)    Under good control. Continue same regimen.   - Annual Wellness Visit - Includes PPPS Subsequent ()    5. Burning sensation    Under good control. Continue same regimen.   - Annual Wellness Visit - Includes PPPS Subsequent ()    6. S/P cervical spinal fusion    Under good control. Continue same regimen.   - Annual Wellness Visit - Includes PPPS Subsequent ()    7. Empty sella syndrome (CMS-[facility])    Under good control. Continue same regimen.   - Annual Wellness Visit - Includes PPPS Subsequent ()    8. Hypogonadism male    Under good control. Continue same regimen.   - Annual Wellness Visit - Includes PPPS Subsequent ()    9. Mixed hyperlipidemia    Under good control. Continue same regimen.   - Annual Wellness Visit - Includes PPPS Subsequent ()    10. Primary insomnia    Under good control. Continue same regimen.   - Annual Wellness Visit - Includes PPPS Subsequent ()    11. Spondylosis of cervical region without myelopathy or radiculopathy    Under good control. Continue same regimen. - Annual Wellness Visit - Includes PPPS Subsequent ()    12. IFG (impaired fasting glucose)    Under good control. Continue same regimen.   - Annual Wellness Visit - Includes PPPS Subsequent ()    13. Vitamin D deficiency    Under good control. Continue same regimen.   - Annual Wellness Visit - Includes PPPS Subsequent ()    14. Smoking greater than 30 pack years (2/3 pack a day for 52 yrs= 34 pk yrs)   Patient counseled. Encouraged to quit tobacco products. NicoDerm prescribed.  - Annual Wellness Visit - Includes PPPS Subsequent ()    15. Primary osteoarthritis involving multiple joints     Under good control. Continue same regimen.   - Annual Wellness Visit  - Includes PPPS Subsequent ()    16. Tobacco dependence       Patient counseled. Encouraged to quit tobacco products. NicoDerm prescribed.    - Annual Wellness Visit - Includes PPPS Subsequent ()      Services suggested: No services needed at this time  Health Care Screening recommendations as per orders if indicated.  Referrals offered: PT/OT/Nutrition counseling/Behavioral Health/Smoking cessation as per orders if indicated.    Discussion today about general wellness and lifestyle habits:    · Prevent falls and reduce trip hazards; Cautioned about securing or removing rugs.  · Have a working fire alarm and carbon monoxide detector;   · Engage in regular physical activity and social activities       Follow-up: No Follow-up on file.

## 2017-06-13 ENCOUNTER — TELEPHONE (OUTPATIENT)
Dept: MEDICAL GROUP | Age: 72
End: 2017-06-13

## 2017-06-13 DIAGNOSIS — E78.2 MIXED HYPERLIPIDEMIA: ICD-10-CM

## 2017-06-13 NOTE — TELEPHONE ENCOUNTER
HEBER ONLY    Phone Number Called: [phone] (home)     Message: Patient informed of lab orders placed    Left Message for patient to call back: no

## 2017-06-13 NOTE — TELEPHONE ENCOUNTER
Lab work ordered. Do one week prior to next appointment. Fasting. He just had blood work done in April which were reviewed at his last office visit and were unremarkable.

## 2017-06-13 NOTE — TELEPHONE ENCOUNTER
1. Caller Name: [phone] (home)                                                  Patient approves a detailed voicemail message: N\A    Patient called and wants current labs done.     Please advise.

## 2017-08-14 PROCEDURE — 99283 EMERGENCY DEPT VISIT LOW MDM: CPT

## 2017-08-15 ENCOUNTER — HOSPITAL ENCOUNTER (EMERGENCY)
Facility: MEDICAL CENTER | Age: 72
End: 2017-08-15
Attending: EMERGENCY MEDICINE
Payer: MEDICARE

## 2017-08-15 VITALS
HEART RATE: 70 BPM | TEMPERATURE: 96.8 F | DIASTOLIC BLOOD PRESSURE: 77 MMHG | WEIGHT: 153 LBS | SYSTOLIC BLOOD PRESSURE: 135 MMHG | HEIGHT: 74 IN | BODY MASS INDEX: 19.64 KG/M2 | OXYGEN SATURATION: 98 % | RESPIRATION RATE: 18 BRPM

## 2017-08-15 DIAGNOSIS — S90.415A: ICD-10-CM

## 2017-08-15 PROCEDURE — 700101 HCHG RX REV CODE 250: Performed by: EMERGENCY MEDICINE

## 2017-08-15 RX ADMIN — TRANEXAMIC ACID 1000 MG: 100 INJECTION, SOLUTION INTRAVENOUS at 00:51

## 2017-08-15 ASSESSMENT — PAIN SCALES - GENERAL: PAINLEVEL_OUTOF10: 2

## 2017-08-15 ASSESSMENT — LIFESTYLE VARIABLES: DO YOU DRINK ALCOHOL: NO

## 2017-08-15 ASSESSMENT — ENCOUNTER SYMPTOMS: BRUISES/BLEEDS EASILY: 1

## 2017-08-15 NOTE — DISCHARGE INSTRUCTIONS
UA small abrasion on the tip of her toe. If this begins to bleed again hold pressure for at least 20 minutes if bleeding fails to resolve please return to the emergency department. The wound does not appear to be infected at this point but please apply Neosporin which you can purchase at [employer] or any other drug store and apply that at least twice daily. If redness spreads from the wound if the wound has any discharge or if she develop any fever or any other concerns please return to the emergency department.    Please follow up with a primary physician for blood pressure management, diabetic screening, and all other preventive health concerns.       Wound Care  Taking care of your wound properly can help to prevent pain and infection. It can also help your wound to heal more quickly.   HOW TO CARE FOR YOUR WOUND   · Take or apply over-the-counter and prescription medicines only as told by your health care provider.  · If you were prescribed antibiotic medicine, take or apply it as told by your health care provider. Do not stop using the antibiotic even if your condition improves.  · Clean the wound each day or as told by your health care provider.  ¨ Wash the wound with mild soap and water.  ¨ Rinse the wound with water to remove all soap.  ¨ Pat the wound dry with a clean towel. Do not rub it.  · There are many different ways to close and cover a wound. For example, a wound can be covered with stitches (sutures), skin glue, or adhesive strips. Follow instructions from your health care provider about:  ¨ How to take care of your wound.  ¨ When and how you should change your bandage (dressing).  ¨ When you should remove your dressing.  ¨ Removing whatever was used to close your wound.  · Check your wound every day for signs of infection. Watch for:  ¨ Redness, swelling, or pain.  ¨ Fluid, blood, or pus.  · Keep the dressing dry until your health care provider says it can be removed. Do not take baths,  swim, use a hot tub, or do anything that would put your wound underwater until your health care provider approves.  · Raise (elevate) the injured area above the level of your heart while you are sitting or lying down.  · Do not scratch or pick at the wound.  · Keep all follow-up visits as told by your health care provider. This is important.  SEEK MEDICAL CARE IF:  · You received a tetanus shot and you have swelling, severe pain, redness, or bleeding at the injection site.  · You have a fever.  · Your pain is not controlled with medicine.  · You have increased redness, swelling, or pain at the site of your wound.  · You have fluid, blood, or pus coming from your wound.  · You notice a bad smell coming from your wound or your dressing.  SEEK IMMEDIATE MEDICAL CARE IF:  · You have a red streak going away from your wound.     This information is not intended to replace advice given to you by your health care provider. Make sure you discuss any questions you have with your health care provider.     Document Released: 09/26/2009 Document Revised: 05/03/2016 Document Reviewed: 12/14/2015  [employer] Interactive Patient Education ©2016 [employer]

## 2017-08-15 NOTE — ED AVS SNAPSHOT
8/15/2017    Alvaro Metcalf Jr.  [address]  [place] [address]    Dear Alvaro:    [facility] wants to ensure your discharge home is safe and you or your loved ones have had all of your questions answered regarding your care after you leave the hospital.    Below is a list of resources and contact information should you have any questions regarding your hospital stay, follow-up instructions, or active medical symptoms.    Questions or Concerns Regarding… Contact   Medical Questions Related to Your Discharge  (7 days a week, 8am-5pm) Contact a Nurse Care Coordinator   [phone]   Medical Questions Not Related to Your Discharge  (24 hours a day / 7 days a week)  Contact the Nurse Health Line   [phone]    Medications or Discharge Instructions Refer to your discharge packet   or contact your [facility] Primary Care Provider   [phone]   Follow-up Appointment(s) Schedule your appointment via [employer]   or contact Scheduling [phone]   Billing Review your statement via [employer]  or contact Billing [phone]   Medical Records Review your records via [employer]   or contact Medical Records [phone]     You may receive a telephone call within two days of discharge. This call is to make certain you understand your discharge instructions and have the opportunity to have any questions answered. You can also easily access your medical information, test results and upcoming appointments via the [employer] free online health management tool. You can learn more and sign up at [employer]. For assistance setting up your [employer] account, please call [phone].    Once again, we want to ensure your discharge home is safe and that you have a clear understanding of any next steps in your care. If you have any questions or concerns, please do not hesitate to contact us, we are here for you. Thank you for choosing [facility] for your healthcare needs.    Sincerely,    Your [facility] Healthcare Team

## 2017-08-15 NOTE — ED NOTES
Patient ambulatory to triage:  Chief Complaint   Patient presents with   • Laceration     right 3rd toe; accidentally cut skin on tip of toe while cutting nails.     Dressing applied to wound, bleeding controlled at this time. No skin flap noted.     Explained wait time and triage process to pt. Pt placed back out in lobby, told to notify ED tech or triage RN of any changes, verbalized understanding.

## 2017-08-15 NOTE — ED PROVIDER NOTES
"ED Provider Note    Scribed for Navin Schultz M.D. by Monica Wick. 8/15/2017  12:39 AM    Means of arrival: walkin  History obtained from: patient  Limitations: none    CHIEF COMPLAINT  Chief Complaint   Patient presents with   • Laceration     right 3rd toe; accidentally cut skin on tip of toe while cutting nails.       HPI  Alvaro Metcalf Jr. is a 72 y.o. male who presents for laceration to right third toe onset tonight. Patient was was cutting his toenails when he accidentally cut the skin off the tip of his toe. There is associated pain and bleeding. History of neck surgery and an undiagnosed condition where he \"shivers\" uncontrollably. He takes aspirin regularly. Patient is allergic to tetanus.     REVIEW OF SYSTEMS  Review of Systems   Musculoskeletal:        Positive for toe laceration, bleeding   Endo/Heme/Allergies: Bruises/bleeds easily.   See HPI for further details.  E    PAST MEDICAL HISTORY   has a past medical history of Arthritic-like pain; Elevated cholesterol; Cerebral palsy (CMS-HCC); Arthritis; Arrhythmia; Renal disorder; CAD (coronary artery disease) (7/20/2012); Hyperlipidemia (7/20/2012); Chronic neck pain (7/20/2012); Fatigue (7/20/2012); Dyspnea (7/20/2012); and Cold feeling.    SOCIAL HISTORY  Social History     Social History Main Topics   • Smoking status: Current Every Day Smoker -- 0.75 packs/day for 54 years     Types: Cigarettes   • Smokeless tobacco: Never Used      Comment: 1/2 pk a day for 45 yrs   • Alcohol Use: 0.0 oz/week     0 Standard drinks or equivalent per week      Comment: very rarely   • Drug Use: No   • Sexual Activity: No       SURGICAL HISTORY   has past surgical history that includes tonsillectomy; cervical disk and fusion anterior (1990); hand surgery (1996); cervical disk and fusion anterior (5/12/2010); inj dx/ther agnt paravert facet joint, ce* (Right, 2/25/2016); inj dx/ther agnt paravert facet joint, ce* (2/25/2016); inj dx/ther agnt paravert " "facet joint, ce* (2/25/2016); and cataract extraction with iol (Bilateral).    CURRENT MEDICATIONS  Home Medications     Reviewed by Radha Shore R.N. (Registered Nurse) on 08/15/17 at 0039  Med List Status: Partial    Medication Last Dose Status    amitriptyline (ELAVIL) 50 MG Tab  Active    aspirin (ASA) 325 MG TABS  Active    atorvastatin (LIPITOR) 40 MG Tab  Active    baclofen (LIORESAL) 10 MG Tab not taking Active    hydrocodone/acetaminophen (NORCO)  MG TABS  Active    lorazepam (ATIVAN) 1 MG TABS  Active    methocarbamol (ROBAXIN) 750 MG TABS  Active    pregabalin (LYRICA) 100 MG Cap  Active    zolpidem (AMBIEN) 10 MG Tab  Active                ALLERGIES  Allergies   Allergen Reactions   • Tetanus Toxoid Anaphylaxis   • Crestor [Rosuvastatin]      Gi upset and myalgias       PHYSICAL EXAM  /76 mmHg  Pulse 73  Temp(Src) 36 °C (96.8 °F)  Resp 18  Ht 1.88 m (6' 2\")  Wt 69.4 kg (153 lb)  BMI 19.64 kg/m2  SpO2 98%  Constitutional: Well developed, Well nourished, No acute distress, Non-toxic appearance.   HENT: Normocephalic, Atraumatic,  Eyes: EOM intact, PERRL  Neck: normal ROM  Cardiovascular: Regular rate and rhythm  Lungs: Clear to auscultation bilaterally, easy unlabored respirations   Abdomen: Bowel sounds normal, Soft, No tenderness  Skin: Warm, Dry, small abrasion to third right digit with minor venous oozing  Extremities: No edema to lower extremities, small abrasion to third right toe with minor venous oozing  Neurologic: Alert and oriented, appropriate, follows commands, moving all extremities, normal speech   Psychiatric: Affect normal    DIAGNOSTIC STUDIES/PROCEDURES  Bleeding Control Procedure Note    Indication: Laceration    Procedure: The patient was placed in the appropriate position. Tranexamic Acid was applied over the area with gauze compression. There were no additional lacerations requiring repair. The wound area was then dressed with a sterile dressing.  "     Total repaired wound length: 1 cm.     Other Items: None    The patient tolerated the procedure well.    Complications: None    OURSE & MEDICAL DECISION MAKING  Pertinent Labs & Imaging studies reviewed. (See chart for details)    12:39 AM Patient seen and examined at bedside. Patient will be treated with 1000mg cyklokapron for his symptoms.     12:52 AM Performed bleeding control procedure. See above for details.    1:23 AM Patient reevaluated at bedside. Patient resting comfortably and in no acute distress. Bleeding is now controlled. Discussed plan for discharge; I advised the patient to follow-up with Dr Hinkle, and to return to the [facility] ED with any new or worsening symptoms, including fever, signs of infection. Patient was given the opportunity for questions. I addressed all questions or concerns at this time and they verbalize agreement to the plan of care.    Decision Making:  This is a 72 y.o. male who presents with mild bleeding after sustaining small abrasion while cutting his nails. TX A applied topically to wound. Pressure held for 10 minutes. Bleeding resolved. Wound dressed patient educated on wound care. Patient to follow up with primary care doctor within the next week.    The patient will return for new or worsening symptoms and is stable at the time of discharge.    The patient is referred to a primary physician for blood pressure management, diabetic screening, and for all other preventative health concerns.    DISPOSITION:  Patient will be discharged home in stable condition.    FOLLOW UP:  No follow-up provider specified.    OUTPATIENT MEDICATIONS:  New Prescriptions    No medications on file     FINAL IMPRESSION  1. Abrasion of toe, left, initial encounter      Monica AGUILAR), am scribing for, and in the presence of, Navin Schultz M.D.    Electronically signed by: Monica Crooks), 8/15/2017    Navin AGUILAR M.D. personally performed the services described  in this documentation, as scribed by Monica Wick in my presence, and it is both accurate and complete.    The note accurately reflects work and decisions made by me.  Navin Schultz  8/15/2017  1:26 AM

## 2017-08-15 NOTE — ED AVS SNAPSHOT
Home Care Instructions                                                                                                                Alvaro Metcalf Jr.   MRN: [id number]    Department:  [facility], Emergency Dept   Date of Visit:  8/14/2017            [facility], Emergency Dept    [address]    [place] [address]    Phone:  [phone]      You were seen by     Navin Schultz M.D.      Your Diagnosis Was     Abrasion of toe, left, initial encounter     S90.415A       These are the medications you received during your hospitalization from 08/14/2017 2350 to 08/15/2017 0126     Date/Time Order Dose Route Action    08/15/2017 0051 Tranexamic Acid (CYKLOKAPRON) 1,000 mg 1,000 mg Topical Given      Follow-up Information     1. Follow up with Chris Hinkle M.D.. Call in 2 days.    Specialty:  Internal Medicine    Contact information    [address]  [place] [address] [phone]        Medication Information     Review all of your home medications and newly ordered medications with your primary doctor and/or pharmacist as soon as possible. Follow medication instructions as directed by your doctor and/or pharmacist.     Please keep your complete medication list with you and share with your physician. Update the information when medications are discontinued, doses are changed, or new medications (including over-the-counter products) are added; and carry medication information at all times in the event of emergency situations.               Medication List      ASK your doctor about these medications        Instructions    Morning Afternoon Evening Bedtime    amitriptyline 50 MG Tabs   Commonly known as:  ELAVIL        One bid.                        aspirin 325 MG Tabs   Commonly known as:  ASA        Take 325 mg by mouth every day.   Dose:  325 mg                        atorvastatin 40 MG Tabs   Commonly known as:  LIPITOR        Take 1 Tab by mouth every  48 hours.   Dose:  40 mg                        baclofen 10 MG Tabs   Commonly known as:  LIORESAL                             hydrocodone/acetaminophen  MG Tabs   Commonly known as:  NORCO        Take 1 Tab by mouth every 8 hours as needed. May fill on or after 7/23/2015.   Dose:  1 Tab                        lorazepam 1 MG Tabs   Commonly known as:  ATIVAN        Take 1 Tab by mouth 2 Times a Day. One bid.   Dose:  1 mg                        methocarbamol 750 MG Tabs   Commonly known as:  ROBAXIN        Take 1 Tab by mouth 3 times a day.   Dose:  750 mg                        pregabalin 100 MG Caps   Commonly known as:  LYRICA        Take 1 Cap by mouth 2 times a day.   Dose:  100 mg                        zolpidem 10 MG Tabs   Commonly known as:  AMBIEN                                       Discharge Instructions       UA small abrasion on the tip of her toe. If this begins to bleed again hold pressure for at least 20 minutes if bleeding fails to resolve please return to the emergency department. The wound does not appear to be infected at this point but please apply Neosporin which you can purchase at [employer] or any other drug store and apply that at least twice daily. If redness spreads from the wound if the wound has any discharge or if she develop any fever or any other concerns please return to the emergency department.      Wound Care  Taking care of your wound properly can help to prevent pain and infection. It can also help your wound to heal more quickly.   HOW TO CARE FOR YOUR WOUND   · Take or apply over-the-counter and prescription medicines only as told by your health care provider.  · If you were prescribed antibiotic medicine, take or apply it as told by your health care provider. Do not stop using the antibiotic even if your condition improves.  · Clean the wound each day or as told by your health care provider.  ¨ Wash the wound with mild soap and water.  ¨ Rinse the wound with  water to remove all soap.  ¨ Pat the wound dry with a clean towel. Do not rub it.  · There are many different ways to close and cover a wound. For example, a wound can be covered with stitches (sutures), skin glue, or adhesive strips. Follow instructions from your health care provider about:  ¨ How to take care of your wound.  ¨ When and how you should change your bandage (dressing).  ¨ When you should remove your dressing.  ¨ Removing whatever was used to close your wound.  · Check your wound every day for signs of infection. Watch for:  ¨ Redness, swelling, or pain.  ¨ Fluid, blood, or pus.  · Keep the dressing dry until your health care provider says it can be removed. Do not take baths, swim, use a hot tub, or do anything that would put your wound underwater until your health care provider approves.  · Raise (elevate) the injured area above the level of your heart while you are sitting or lying down.  · Do not scratch or pick at the wound.  · Keep all follow-up visits as told by your health care provider. This is important.  SEEK MEDICAL CARE IF:  · You received a tetanus shot and you have swelling, severe pain, redness, or bleeding at the injection site.  · You have a fever.  · Your pain is not controlled with medicine.  · You have increased redness, swelling, or pain at the site of your wound.  · You have fluid, blood, or pus coming from your wound.  · You notice a bad smell coming from your wound or your dressing.  SEEK IMMEDIATE MEDICAL CARE IF:  · You have a red streak going away from your wound.     This information is not intended to replace advice given to you by your health care provider. Make sure you discuss any questions you have with your health care provider.     Document Released: 09/26/2009 Document Revised: 05/03/2016 Document Reviewed: 12/14/2015  [employer] Interactive Patient Education ©2016 [employer]            Patient Information     Patient Information    Following emergency treatment:  all patient requiring follow-up care must return either to a private physician or a clinic if your condition worsens before you are able to obtain further medical attention, please return to the emergency room.     Billing Information    At [facility], we work to make the billing process streamlined for our patients.  Our Representatives are here to answer any questions you may have regarding your hospital bill.  If you have insurance coverage and have supplied your insurance information to us, we will submit a claim to your insurer on your behalf.  Should you have any questions regarding your bill, we can be reached online or by phone as follows:  Online: You are able pay your bills online or live chat with our representatives about any billing questions you may have. We are here to help Monday - Friday from 8:00am to 7:30pm and 9:00am - 12:00pm on Saturdays.  Please visit [email or web address]  for more information.   Phone:  [phone] or [phone]    Please note that your emergency physician, surgeon, pathologist, radiologist, anesthesiologist, and other specialists are not employed by [facility] and will therefore bill separately for their services.  Please contact them directly for any questions concerning their bills at the numbers below:     Emergency Physician Services:  [phone]  [facility]:  [phone]  [facility]:  [phone]  [facility]:  [phone]    1. Your final bill may vary from the amount quoted upon discharge if all procedures are not complete at that time, or if your doctor has additional procedures of which we are not aware. You will receive an additional bill if you return to the Emergency Department at [facility] for suture removal regardless of the facility of which the sutures were placed.     2. Please arrange for settlement of this account at the emergency registration.    3. All  self-pay accounts are due in full at the time of treatment.  If you are unable to meet this obligation then payment is expected within 4-5 days.     4. If you have had radiology studies (CT, X-ray, Ultrasound, MRI), you have received a preliminary result during your emergency department visit. Please contact the radiology department [phone] to receive a copy of your final result. Please discuss the Final result with your primary physician or with the follow up physician provided.     Crisis Hotline:  [place] Crisis Hotline:  0-264-OPOPFUP or 1-599.377.4183  [place] Crisis Hotline:    1-846.634.5359 or 560-322-4951         ED Discharge Follow Up Questions    1. In order to provide you with very good care, we would like to follow up with a phone call in the next few days.  May we have your permission to contact you?     YES /  NO    2. What is the best phone number to call you? (       )_____-__________    3. What is the best time to call you?      Morning  /  Afternoon  /  Evening                   Patient Signature:  ____________________________________________________________    Date:  ____________________________________________________________      Your appointments     Oct 26, 2017  1:00 PM   Urgent/Same Day with Chris Hinkle M.D.   [facility] [address] [place] [address] ([facility])    [address] [place] [address] [phone]           You will be receiving a confirmation call a few days before your appointment from our automated call confirmation system.

## 2017-08-15 NOTE — ED NOTES
Alvaro Metcalf Jr. discharged via ambulation with self.  Discharge instructions given and reviewed, patient educated to follow up with PCP, verbalized understanding.  All personal belongings in possession.  No questions at this time.

## 2017-08-18 ENCOUNTER — TELEPHONE (OUTPATIENT)
Dept: MEDICAL GROUP | Age: 72
End: 2017-08-18

## 2017-08-18 NOTE — TELEPHONE ENCOUNTER
ESTABLISHED PATIENT PRE-VISIT PLANNING     Note: Patient will not be contacted if there is no indication to call.     1.  Reviewed notes from the last few office visits within the medical group: Yes    2.  If any orders were placed at last visit or intended to be done for this visit (i.e. 6 mos follow-up), do we have Results/Consult Notes?        •  Labs - labs ordered for future visit       •  Imaging - Imaging was not ordered at last office visit.       •  Referrals - No referrals were ordered at last office visit.    3. Is this appointment scheduled as a Hospital Follow-Up? No    4.  Immunizations were updated in Epic using WebIZ?: Epic matches WebIZ       •  Web Iz Recommendations: FLU, TDAP and ZOSTAVAX (Shingles)    5.  Patient is due for the following Health Maintenance Topics:   Health Maintenance Due   Topic Date Due   • IMM ZOSTER VACCINE  04/12/2004       - Patient is up-to-date on all Health Maintenance topics. No records have been requested at this time.    6.  Patient was NOT informed to arrive 15 min prior to their scheduled appointment and bring in their medication bottles.

## 2017-08-21 ENCOUNTER — OFFICE VISIT (OUTPATIENT)
Dept: MEDICAL GROUP | Age: 72
End: 2017-08-21
Payer: MEDICARE

## 2017-08-21 VITALS
WEIGHT: 156.2 LBS | HEART RATE: 78 BPM | HEIGHT: 75 IN | OXYGEN SATURATION: 98 % | DIASTOLIC BLOOD PRESSURE: 70 MMHG | TEMPERATURE: 98.2 F | BODY MASS INDEX: 19.42 KG/M2 | SYSTOLIC BLOOD PRESSURE: 120 MMHG

## 2017-08-21 DIAGNOSIS — Z51.89 VISIT FOR WOUND CHECK: ICD-10-CM

## 2017-08-21 PROCEDURE — 99213 OFFICE O/P EST LOW 20 MIN: CPT | Performed by: INTERNAL MEDICINE

## 2017-08-21 RX ORDER — GABAPENTIN 100 MG/1
CAPSULE ORAL
COMMUNITY
Start: 2017-08-10 | End: 2018-04-05

## 2017-08-21 ASSESSMENT — PAIN SCALES - GENERAL: PAINLEVEL: NO PAIN

## 2017-08-21 NOTE — PROGRESS NOTES
Subjective:   Alvaro Metcalf Jr. is a 72 y.o. male here today for evaluation and management of:      Visit for wound check  Patient stated that he injured his right third toe when he was cutting his toenails. He accidentally cut the skin of the tip of the right third toe. He tried to stop bleeding at home, but the bleeding did not stop and he went to ER for evaluation on 8/15/17. The bleeding stopped eventually with applying pressure. He did not require to do suture or stitches. He is taking aspirin 325 mg daily. He did not have any pain on his wound. He applies Neosporin over the wound and covered with bandage. He is here today for wound check. He feels much better and he denied pain on his wound. He does not have any symptoms of infection.         Current medicines (including changes today)  Current Outpatient Prescriptions   Medication Sig Dispense Refill   • gabapentin (NEURONTIN) 100 MG Cap      • zolpidem (AMBIEN) 10 MG Tab      • atorvastatin (LIPITOR) 40 MG Tab Take 1 Tab by mouth every 48 hours. 45 Tab 4   • amitriptyline (ELAVIL) 50 MG Tab One bid. 180 Tab 3   • pregabalin (LYRICA) 100 MG Cap Take 1 Cap by mouth 2 times a day. 180 Cap 3   • methocarbamol (ROBAXIN) 750 MG TABS Take 1 Tab by mouth 3 times a day. 90 Tab 3   • lorazepam (ATIVAN) 1 MG TABS Take 1 Tab by mouth 2 Times a Day. One bid. 60 Tab 3   • hydrocodone/acetaminophen (NORCO)  MG TABS Take 1 Tab by mouth every 8 hours as needed. May fill on or after 7/23/2015. 90 Tab 0   • aspirin (ASA) 325 MG TABS Take 325 mg by mouth every day.       No current facility-administered medications for this visit.     He  has a past medical history of Arthritic-like pain; Elevated cholesterol; Cerebral palsy (CMS-HCC); Arthritis; Arrhythmia; Renal disorder; CAD (coronary artery disease) (7/20/2012); Hyperlipidemia (7/20/2012); Chronic neck pain (7/20/2012); Fatigue (7/20/2012); Dyspnea (7/20/2012); and Cold feeling.    ROS   No chest pain, no  "shortness of breath, no abdominal pain       Objective:     Blood pressure 120/70, pulse 78, temperature 36.8 °C (98.2 °F), height 1.905 m (6' 3\"), weight 70.852 kg (156 lb 3.2 oz), SpO2 98 %. Body mass index is 19.52 kg/(m^2).   Physical Exam:  General: Alert, oriented and no acute distress.  Eye contact is good, speech goal directed, affect calm  HEENT: conjunctiva non-injected, sclera non-icteric.  Oral mucous membranes pink and moist with no lesions.  Pinna normal.   Lungs: Normal respiratory effort, clear to auscultation bilaterally with good excursion.  CV: regular rate and rhythm. No murmurs.   Abdomen: soft, non distended, nontender, Bowel sound normal.  Ext: no edema, color normal, vascularity normal, temperature normal  Musculoskeletal exam: Mild abrasion on the tip of right third toe with old blood scab. No signs of infection.       Assessment and Plan:   The following treatment plan was discussed     1. Visit for wound check  - Discussed to keep wound dry and clean. He can apply Neosporin once a day and cover with bandage. No signs of infection.  - Advised patient to have  to cut his toenails. He agreed with the plan.  - He can continue to take aspirin 325 mg daily.      Patient already has follow-up appointment with his PCP, Dr. Hinkle on 10/26/he is advised to keep follow-up appointment with his PCP as scheduled. Reprinted blood tests ordered by Dr. Hinkle and advise him to do blood test a week before follow-up with Dr. Hinkle.       Followup: Return if symptoms worsen or fail to improve.      Please note that this dictation was created using voice recognition software. I have made every reasonable attempt to correct obvious errors, but I expect that there may have unintended errors in text, spelling, punctuation, or grammar that I did not discover.             "

## 2017-08-21 NOTE — MR AVS SNAPSHOT
"        Alvaro Metcalf JrMadeleine   2017 1:40 PM   Office Visit   MRN: [id number]    Department:  [address] [place] [address]   Dept Phone:  [phone]    Description:  Male : 1945   Provider:  Jessica Streeter M.D.           Reason for Visit     Toe Injury toe nail injury f/v      Allergies as of 2017     Allergen Noted Reactions    Tetanus Toxoid 2009   Anaphylaxis    Crestor [Rosuvastatin] 2015       Gi upset and myalgias      You were diagnosed with     Visit for wound check   [[id number]]         Vital Signs     Blood Pressure Pulse Temperature Height Weight Body Mass Index    120/70 mmHg 78 36.8 °C (98.2 °F) 1.905 m (6' 3\") 70.852 kg (156 lb 3.2 oz) 19.52 kg/m2    Oxygen Saturation Smoking Status                98% Current Every Day Smoker          Basic Information     Date Of Birth Sex Race Ethnicity Preferred Language    1945 Male White Non- English      Your appointments     Oct 26, 2017  1:00 PM   Urgent/Same Day with Chris Hinkle M.D.   [address])    [address] [place] [address] [address]  [facility] [id number] [phone]           You will be receiving a confirmation call a few days before your appointment from our automated call confirmation system.              Problem List              ICD-10-CM Priority Class Noted - Resolved    Chronic neck pain- S/P cervical spinal fusion- followed by pain mgt- dr white M54.2, G89.29   2012 - Present    Cerebral palsy, unspecified G80.9   2012 - Present    Burning sensation R20.8   10/7/2013 - Present    S/P cervical spinal fusion Z98.1   10/7/2013 - Present    Empty sella syndrome (CMS-HCC) E23.6   10/30/2013 - Present    Hypogonadism male E29.1   10/30/2013 - Present    Mixed hyperlipidemia E78.2   2015 - Present    Primary insomnia F51.01   2015 - Present    Spondylosis of cervical region without myelopathy or radiculopathy M47.812   2016 - Present    IFG (impaired fasting glucose) " R73.01   4/12/2016 - Present    Vitamin D deficiency E55.9   4/12/2016 - Present    Smoking greater than 30 pack years (2/3 pack a day for 52 yrs= 34 pk yrs) F17.210   10/17/2016 - Present    Primary osteoarthritis involving multiple joints M15.0   1/17/2017 - Present    Tobacco dependence F17.200   1/17/2017 - Present    Visit for wound check Z51.89   8/21/2017 - Present      Health Maintenance        Date Due Completion Dates    IMM ZOSTER VACCINE 4/12/2004 ---    IMM INFLUENZA (1) 9/1/2017 9/19/2016, 9/22/2015, 10/6/2014    COLONOSCOPY 1/21/2019 1/21/2014            Current Immunizations     13-VALENT PCV PREVNAR 4/12/2016    Influenza Vaccine Adult HD 9/19/2016, 9/22/2015    Influenza Vaccine Quad Inj (Pf) 10/6/2014  2:39 PM    Pneumococcal polysaccharide vaccine (PPSV-23) 4/27/2017 12:12 PM      Below and/or attached are the medications your provider expects you to take. Review all of your home medications and newly ordered medications with your provider and/or pharmacist. Follow medication instructions as directed by your provider and/or pharmacist. Please keep your medication list with you and share with your provider. Update the information when medications are discontinued, doses are changed, or new medications (including over-the-counter products) are added; and carry medication information at all times in the event of emergency situations     Allergies:  TETANUS TOXOID - Anaphylaxis     CRESTOR - (reactions not documented)               Medications  Valid as of: August 21, 2017 -  2:36 PM    Generic Name Brand Name Tablet Size Instructions for use    Amitriptyline HCl (Tab) ELAVIL 50 MG One bid.        Aspirin (Tab)  MG Take 325 mg by mouth every day.        Atorvastatin Calcium (Tab) LIPITOR 40 MG Take 1 Tab by mouth every 48 hours.        Gabapentin (Cap) NEURONTIN 100 MG         Hydrocodone-Acetaminophen (Tab) NORCO  MG Take 1 Tab by mouth every 8 hours as needed. May fill on or after  7/23/2015.        LORazepam (Tab) ATIVAN 1 MG Take 1 Tab by mouth 2 Times a Day. One bid.        Methocarbamol (Tab) ROBAXIN 750 MG Take 1 Tab by mouth 3 times a day.        Pregabalin (Cap) LYRICA 100 MG Take 1 Cap by mouth 2 times a day.        Zolpidem Tartrate (Tab) AMBIEN 10 MG         .                 Medicines prescribed today were sent to:     [id number] - [place], [place] - [address] [facility] [place] [address]    Phone: [phone] Fax: [phone]    Open 24 Hours?: No      Medication refill instructions:       If your prescription bottle indicates you have medication refills left, it is not necessary to call your provider’s office. Please contact your pharmacy and they will refill your medication.    If your prescription bottle indicates you do not have any refills left, you may request refills at any time through one of the following ways: The online CellSpin system (except Urgent Care), by calling your provider’s office, or by asking your pharmacy to contact your provider’s office with a refill request. Medication refills are processed only during regular business hours and may not be available until the next business day. Your provider may request additional information or to have a follow-up visit with you prior to refilling your medication.   *Please Note: Medication refills are assigned a new Rx number when refilled electronically. Your pharmacy may indicate that no refills were authorized even though a new prescription for the same medication is available at the pharmacy. Please request the medicine by name with the pharmacy before contacting your provider for a refill.        Other Notes About Your Plan     G80.9 Cerebral Palsy, E23.7 Empty Sella Syndrome  Query: If BMI<18 please consider dx of Cachexia           CellSpin Access Code: [email or web address]  Expires: 9/14/2017  1:25 AM    CellSpin  A secure, online tool to manage your health information     [employer]  is a secure, online tool that connects you to your personalized health information from the privacy of your home -- day or night - making it very easy for you to manage your healthcare. Once the activation process is completed, you can even access your medical information using the Terapeak shiva, which is available for free in the Apple Shiva store or Google Play store.     Terapeak provides the following levels of access (as shown below):   My Chart Features   [facility] Doctor [facility]  Specialists [facility]  Urgent  Care Non-[facility]  Primary Care  Doctor   Email your healthcare team securely and privately 24/7 X X X    Manage appointments: schedule your next appointment; view details of past/upcoming appointments X      Request prescription refills. X      View recent personal medical records, including lab and immunizations X X X X   View health record, including health history, allergies, medications X X X X   Read reports about your outpatient visits, procedures, consult and ER notes X X X X   See your discharge summary, which is a recap of your hospital and/or ER visit that includes your diagnosis, lab results, and care plan. X X       How to register for Terapeak:  1. Go to  [email or web address].  2. Click on the Sign Up Now box, which takes you to the New Member Sign Up page. You will need to provide the following information:  a. Enter your Terapeak Access Code exactly as it appears at the top of this page. (You will not need to use this code after you’ve completed the sign-up process. If you do not sign up before the expiration date, you must request a new code.)   b. Enter your date of birth.   c. Enter your home email address.   d. Click Submit, and follow the next screen’s instructions.  3. Create a S[employer] Development[employer] ID. This will be your Terapeak login ID and cannot be changed, so think of one that is secure and easy to remember.  4. Create a Terapeak password. You can change your password at any  time.  5. Enter your Password Reset Question and Answer. This can be used at a later time if you forget your password.   6. Enter your e-mail address. This allows you to receive e-mail notifications when new information is available in PrismaStar.  7. Click Sign Up. You can now view your health information.    For assistance activating your PrismaStar account, call [phone]        Quit Tobacco Information     Do you want to quit using tobacco?    Quitting tobacco decreases risks of cancer, heart and lung disease, increases life expectancy, improves sense of taste and smell, and increases spending money, among other benefits.    If you are thinking about quitting, we can help.  • [facility] Quit Tobacco Program: 516.162.5185  o Program occurs weekly for four weeks and includes pharmacist consultation on products to support quitting smoking or chewing tobacco. A provider referral is needed for pharmacist consultation.  • Tobacco Users Help Hotline: 2-800QUIT-NOW (570-6837) or https://[place].quitlogix.org/  o Free, confidential telephone and online coaching for [place] residents. Sessions are designed on a schedule that is convenient for you. Eligible clients receive free nicotine replacement therapy.  • Nationally: www.smokefree.gov  o Information and professional assistance to support both immediate and long-term needs as you become, and remain, a non-smoker. Smokefree.gov allows you to choose the help that best fits your needs.

## 2017-08-21 NOTE — ASSESSMENT & PLAN NOTE
Patient stated that he injured his right third toe when he was cutting his toenails. He accidentally cut the skin of the tip of the right third toe. He tried to stop bleeding at home, but the bleeding did not stop and he went to ER for evaluation on 8/15/17. The bleeding stopped eventually with applying pressure. He did not require to do suture or stitches. He is taking aspirin 325 mg daily. He did not have any pain on his wound. He applies Neosporin over the wound and covered with bandage. He is here today for wound check. He feels much better and he denied pain on his wound. He does not have any symptoms of infection.

## 2017-10-05 ENCOUNTER — APPOINTMENT (RX ONLY)
Dept: URBAN - METROPOLITAN AREA CLINIC 4 | Facility: CLINIC | Age: 72
Setting detail: DERMATOLOGY
End: 2017-10-05

## 2017-10-05 DIAGNOSIS — D18.0 HEMANGIOMA: ICD-10-CM

## 2017-10-05 DIAGNOSIS — L30.9 DERMATITIS, UNSPECIFIED: ICD-10-CM

## 2017-10-05 DIAGNOSIS — L82.1 OTHER SEBORRHEIC KERATOSIS: ICD-10-CM

## 2017-10-05 DIAGNOSIS — Z85.828 PERSONAL HISTORY OF OTHER MALIGNANT NEOPLASM OF SKIN: ICD-10-CM

## 2017-10-05 DIAGNOSIS — L81.4 OTHER MELANIN HYPERPIGMENTATION: ICD-10-CM

## 2017-10-05 PROBLEM — I10 ESSENTIAL (PRIMARY) HYPERTENSION: Status: ACTIVE | Noted: 2017-10-05

## 2017-10-05 PROBLEM — D18.01 HEMANGIOMA OF SKIN AND SUBCUTANEOUS TISSUE: Status: ACTIVE | Noted: 2017-10-05

## 2017-10-05 PROCEDURE — 99213 OFFICE O/P EST LOW 20 MIN: CPT

## 2017-10-05 PROCEDURE — ? PRESCRIPTION

## 2017-10-05 PROCEDURE — ? TREATMENT REGIMEN

## 2017-10-05 PROCEDURE — ? COUNSELING

## 2017-10-05 PROCEDURE — ? OBSERVATION

## 2017-10-05 RX ORDER — TRIAMCINOLONE ACETONIDE 1 MG/G
CREAM TOPICAL BID
Qty: 1 | Refills: 6 | Status: ERX | COMMUNITY
Start: 2017-10-05

## 2017-10-05 RX ADMIN — TRIAMCINOLONE ACETONIDE: 1 CREAM TOPICAL at 00:00

## 2017-10-05 ASSESSMENT — LOCATION SIMPLE DESCRIPTION DERM
LOCATION SIMPLE: RIGHT FOREARM
LOCATION SIMPLE: LEFT UPPER BACK
LOCATION SIMPLE: LEFT THIGH
LOCATION SIMPLE: RIGHT UPPER ARM
LOCATION SIMPLE: CHEST
LOCATION SIMPLE: RIGHT UPPER BACK
LOCATION SIMPLE: RIGHT POSTERIOR THIGH
LOCATION SIMPLE: LEFT UPPER ARM
LOCATION SIMPLE: LEFT FOREARM
LOCATION SIMPLE: LEFT POSTERIOR THIGH
LOCATION SIMPLE: RIGHT THIGH

## 2017-10-05 ASSESSMENT — LOCATION DETAILED DESCRIPTION DERM
LOCATION DETAILED: LEFT PROXIMAL POSTERIOR UPPER ARM
LOCATION DETAILED: RIGHT DISTAL POSTERIOR THIGH
LOCATION DETAILED: RIGHT DISTAL POSTERIOR UPPER ARM
LOCATION DETAILED: RIGHT MID-UPPER BACK
LOCATION DETAILED: LEFT MEDIAL INFERIOR CHEST
LOCATION DETAILED: LEFT DISTAL POSTERIOR UPPER ARM
LOCATION DETAILED: LEFT DISTAL POSTERIOR THIGH
LOCATION DETAILED: RIGHT DISTAL DORSAL FOREARM
LOCATION DETAILED: LEFT MEDIAL UPPER BACK
LOCATION DETAILED: RIGHT SUPERIOR MEDIAL UPPER BACK
LOCATION DETAILED: RIGHT ANTERIOR PROXIMAL THIGH
LOCATION DETAILED: LEFT ANTERIOR PROXIMAL THIGH
LOCATION DETAILED: LEFT PROXIMAL DORSAL FOREARM

## 2017-10-05 ASSESSMENT — LOCATION ZONE DERM
LOCATION ZONE: LEG
LOCATION ZONE: ARM
LOCATION ZONE: TRUNK

## 2017-10-05 NOTE — PROCEDURE: OBSERVATION
Body Location Override (Optional - Billing Will Still Be Based On Selected Body Map Location If Applicable): Lt med Chest
Detail Level: Detailed
Size Of Lesion In Cm (Optional): 0

## 2017-10-10 DIAGNOSIS — F17.210 SMOKING GREATER THAN 30 PACK YEARS: ICD-10-CM

## 2017-10-10 DIAGNOSIS — Z12.2 ENCOUNTER FOR SCREENING FOR LUNG CANCER: ICD-10-CM

## 2017-10-11 ENCOUNTER — PATIENT OUTREACH (OUTPATIENT)
Dept: HEALTH INFORMATION MANAGEMENT | Facility: OTHER | Age: 72
End: 2017-10-11

## 2017-10-19 ENCOUNTER — HOSPITAL ENCOUNTER (OUTPATIENT)
Dept: LAB | Facility: MEDICAL CENTER | Age: 72
End: 2017-10-19
Attending: INTERNAL MEDICINE
Payer: MEDICARE

## 2017-10-19 DIAGNOSIS — E78.2 MIXED HYPERLIPIDEMIA: ICD-10-CM

## 2017-10-19 LAB
ALBUMIN SERPL BCP-MCNC: 4.1 G/DL (ref 3.2–4.9)
ALBUMIN/GLOB SERPL: 1.6 G/DL
ALP SERPL-CCNC: 64 U/L (ref 30–99)
ALT SERPL-CCNC: 22 U/L (ref 2–50)
ANION GAP SERPL CALC-SCNC: 3 MMOL/L (ref 0–11.9)
AST SERPL-CCNC: 28 U/L (ref 12–45)
BASOPHILS # BLD AUTO: 0.9 % (ref 0–1.8)
BASOPHILS # BLD: 0.07 K/UL (ref 0–0.12)
BILIRUB SERPL-MCNC: 0.7 MG/DL (ref 0.1–1.5)
BUN SERPL-MCNC: 29 MG/DL (ref 8–22)
CALCIUM SERPL-MCNC: 9.1 MG/DL (ref 8.5–10.5)
CHLORIDE SERPL-SCNC: 110 MMOL/L (ref 96–112)
CHOLEST SERPL-MCNC: 142 MG/DL (ref 100–199)
CO2 SERPL-SCNC: 27 MMOL/L (ref 20–33)
CREAT SERPL-MCNC: 0.74 MG/DL (ref 0.5–1.4)
EOSINOPHIL # BLD AUTO: 0.26 K/UL (ref 0–0.51)
EOSINOPHIL NFR BLD: 3.5 % (ref 0–6.9)
ERYTHROCYTE [DISTWIDTH] IN BLOOD BY AUTOMATED COUNT: 42.5 FL (ref 35.9–50)
GFR SERPL CREATININE-BSD FRML MDRD: >60 ML/MIN/1.73 M 2
GLOBULIN SER CALC-MCNC: 2.5 G/DL (ref 1.9–3.5)
GLUCOSE SERPL-MCNC: 108 MG/DL (ref 65–99)
HCT VFR BLD AUTO: 45.2 % (ref 42–52)
HDLC SERPL-MCNC: 37 MG/DL
HGB BLD-MCNC: 15.1 G/DL (ref 14–18)
IMM GRANULOCYTES # BLD AUTO: 0.03 K/UL (ref 0–0.11)
IMM GRANULOCYTES NFR BLD AUTO: 0.4 % (ref 0–0.9)
LDLC SERPL CALC-MCNC: 65 MG/DL
LYMPHOCYTES # BLD AUTO: 1.99 K/UL (ref 1–4.8)
LYMPHOCYTES NFR BLD: 26.8 % (ref 22–41)
MCH RBC QN AUTO: 30.2 PG (ref 27–33)
MCHC RBC AUTO-ENTMCNC: 33.4 G/DL (ref 33.7–35.3)
MCV RBC AUTO: 90.4 FL (ref 81.4–97.8)
MONOCYTES # BLD AUTO: 0.55 K/UL (ref 0–0.85)
MONOCYTES NFR BLD AUTO: 7.4 % (ref 0–13.4)
NEUTROPHILS # BLD AUTO: 4.52 K/UL (ref 1.82–7.42)
NEUTROPHILS NFR BLD: 61 % (ref 44–72)
NRBC # BLD AUTO: 0 K/UL
NRBC BLD AUTO-RTO: 0 /100 WBC
PLATELET # BLD AUTO: 149 K/UL (ref 164–446)
PMV BLD AUTO: 10 FL (ref 9–12.9)
POTASSIUM SERPL-SCNC: 4.2 MMOL/L (ref 3.6–5.5)
PROT SERPL-MCNC: 6.6 G/DL (ref 6–8.2)
RBC # BLD AUTO: 5 M/UL (ref 4.7–6.1)
SODIUM SERPL-SCNC: 140 MMOL/L (ref 135–145)
TRIGL SERPL-MCNC: 202 MG/DL (ref 0–149)
WBC # BLD AUTO: 7.4 K/UL (ref 4.8–10.8)

## 2017-10-19 PROCEDURE — 36415 COLL VENOUS BLD VENIPUNCTURE: CPT

## 2017-10-19 PROCEDURE — 80061 LIPID PANEL: CPT

## 2017-10-19 PROCEDURE — 80053 COMPREHEN METABOLIC PANEL: CPT

## 2017-10-19 PROCEDURE — 85025 COMPLETE CBC W/AUTO DIFF WBC: CPT

## 2017-10-24 ENCOUNTER — HOSPITAL ENCOUNTER (OUTPATIENT)
Dept: RADIOLOGY | Facility: MEDICAL CENTER | Age: 72
End: 2017-10-24
Attending: INTERNAL MEDICINE
Payer: MEDICARE

## 2017-10-24 DIAGNOSIS — F17.210 SMOKING GREATER THAN 30 PACK YEARS: ICD-10-CM

## 2017-10-24 DIAGNOSIS — Z12.2 ENCOUNTER FOR SCREENING FOR LUNG CANCER: ICD-10-CM

## 2017-10-24 PROCEDURE — G0297 LDCT FOR LUNG CA SCREEN: HCPCS

## 2017-10-25 ENCOUNTER — TELEPHONE (OUTPATIENT)
Dept: MEDICAL GROUP | Age: 72
End: 2017-10-25

## 2017-10-25 NOTE — TELEPHONE ENCOUNTER
ESTABLISHED PATIENT PRE-VISIT PLANNING     Note: Patient will not be contacted if there is no indication to call.     1.  Reviewed notes from the last few office visits within the medical group: Yes    2.  If any orders were placed at last visit or intended to be done for this visit (i.e. 6 mos follow-up), do we have Results/Consult Notes?        •  Labs - Labs ordered, completed on 10/19 and results are in chart.   Note: If patient appointment is for lab review and patient did not complete labs,                check with provider if OK to reschedule patient until labs completed.       •  Imaging - Imaging ordered, completed and results are in chart.       •  Referrals - No referrals were ordered at last office visit.    3. Is this appointment scheduled as a Hospital Follow-Up? No    4.  Immunizations were updated in [employer] using WebIZ?: Yes       •  Web Iz Recommendations: TDAP and ZOSTAVAX (Shingles)    5.  Patient is due for the following Health Maintenance Topics:   Health Maintenance Due   Topic Date Due   • IMM ZOSTER VACCINE  04/12/2004       - Patient is up-to-date on all Health Maintenance topics. No records have been requested at this time.    6.  Patient was NOT informed to arrive 15 min prior to their scheduled appointment and bring in their medication bottles.

## 2017-10-26 ENCOUNTER — OFFICE VISIT (OUTPATIENT)
Dept: MEDICAL GROUP | Age: 72
End: 2017-10-26
Payer: MEDICARE

## 2017-10-26 VITALS
SYSTOLIC BLOOD PRESSURE: 106 MMHG | DIASTOLIC BLOOD PRESSURE: 60 MMHG | HEART RATE: 90 BPM | WEIGHT: 156 LBS | HEIGHT: 75 IN | TEMPERATURE: 97.5 F | OXYGEN SATURATION: 96 % | BODY MASS INDEX: 19.4 KG/M2

## 2017-10-26 DIAGNOSIS — F51.01 PRIMARY INSOMNIA: ICD-10-CM

## 2017-10-26 DIAGNOSIS — E78.2 MIXED HYPERLIPIDEMIA: ICD-10-CM

## 2017-10-26 DIAGNOSIS — R73.01 IFG (IMPAIRED FASTING GLUCOSE): ICD-10-CM

## 2017-10-26 DIAGNOSIS — Z12.2 ENCOUNTER FOR SCREENING FOR LUNG CANCER: ICD-10-CM

## 2017-10-26 DIAGNOSIS — F17.200 TOBACCO DEPENDENCE: ICD-10-CM

## 2017-10-26 PROCEDURE — 99214 OFFICE O/P EST MOD 30 MIN: CPT | Performed by: INTERNAL MEDICINE

## 2017-10-26 RX ORDER — ATORVASTATIN CALCIUM 40 MG/1
40 TABLET, FILM COATED ORAL
Qty: 45 TAB | Refills: 4 | Status: SHIPPED | OUTPATIENT
Start: 2017-10-26 | End: 2018-12-26 | Stop reason: SDUPTHER

## 2017-10-26 NOTE — PROGRESS NOTES
Subjective:      Alvaro Metcalf Jr. is a 72 y.o. male who presents with Hypertension (evaluation on blood work results )  The patient is here for followup of chronic medical problems listed below. The patient is compliant with medications and having no side effects from them. Denies chest pain, abdominal pain, dyspnea, myalgias, or cough.   Patient Active Problem List    Diagnosis Date Noted   • Encounter for screening for lung cancer 10/10/2017   • Visit for wound check 08/21/2017   • Primary osteoarthritis involving multiple joints 01/17/2017   • Tobacco dependence 01/17/2017   • Smoking greater than 30 pack years (2/3 pack a day for 52 yrs= 34 pk yrs) 10/17/2016   • IFG (impaired fasting glucose) 04/12/2016   • Vitamin D deficiency 04/12/2016   • Spondylosis of cervical region without myelopathy or radiculopathy 02/25/2016   • Mixed hyperlipidemia 09/14/2015   • Primary insomnia 09/14/2015   • Empty sella syndrome (CMS-HCC) 10/30/2013   • Hypogonadism male 10/30/2013   • Burning sensation 10/07/2013   • S/P cervical spinal fusion 10/07/2013   • Cerebral palsy, unspecified 08/28/2012   • Chronic neck pain- S/P cervical spinal fusion- followed by pain mgt- dr white 07/20/2012     Allergies   Allergen Reactions   • Tetanus Toxoid Anaphylaxis   • Crestor [Rosuvastatin]      Gi upset and myalgias     I   Outpatient Medications Prior to Visit   Medication Sig Dispense Refill   • zolpidem (AMBIEN) 10 MG Tab      • amitriptyline (ELAVIL) 50 MG Tab One bid. 180 Tab 3   • pregabalin (LYRICA) 100 MG Cap Take 1 Cap by mouth 2 times a day. 180 Cap 3   • methocarbamol (ROBAXIN) 750 MG TABS Take 1 Tab by mouth 3 times a day. 90 Tab 3   • lorazepam (ATIVAN) 1 MG TABS Take 1 Tab by mouth 2 Times a Day. One bid. 60 Tab 3   • hydrocodone/acetaminophen (NORCO)  MG TABS Take 1 Tab by mouth every 8 hours as needed. May fill on or after 7/23/2015. 90 Tab 0   • aspirin (ASA) 325 MG TABS Take 325 mg by mouth every day.    "  • gabapentin (NEURONTIN) 100 MG Cap      • atorvastatin (LIPITOR) 40 MG Tab Take 1 Tab by mouth every 48 hours. 45 Tab 4     No facility-administered medications prior to visit.                HPI    ROS       Objective:     /60   Pulse 90   Temp 36.4 °C (97.5 °F)   Ht 1.905 m (6' 3\")   Wt 70.8 kg (156 lb)   SpO2 96%   BMI 19.50 kg/m²      Physical Exam   Constitutional: He is oriented to person, place, and time. He appears well-developed and well-nourished. No distress.   HENT:   Head: Normocephalic and atraumatic.   Right Ear: External ear normal.   Left Ear: External ear normal.   Nose: Nose normal.   Mouth/Throat: Oropharynx is clear and moist. No oropharyngeal exudate.   Eyes: Conjunctivae and EOM are normal. Pupils are equal, round, and reactive to light. Right eye exhibits no discharge. Left eye exhibits no discharge. No scleral icterus.   Neck: Normal range of motion. Neck supple. No JVD present. No tracheal deviation present. No thyromegaly present.   Cardiovascular: Normal rate, regular rhythm, normal heart sounds and intact distal pulses.  Exam reveals no gallop and no friction rub.    No murmur heard.  Pulmonary/Chest: Effort normal and breath sounds normal. No stridor. No respiratory distress. He has no wheezes. He has no rales. He exhibits no tenderness.   Abdominal: Soft. Bowel sounds are normal. He exhibits no distension and no mass. There is no tenderness. There is no rebound and no guarding.   Musculoskeletal: Normal range of motion. He exhibits no edema or tenderness.   Lymphadenopathy:     He has no cervical adenopathy.   Neurological: He is alert and oriented to person, place, and time. He has normal reflexes. He displays normal reflexes. He exhibits normal muscle tone. Coordination normal.   Skin: Skin is warm and dry. No rash noted. He is not diaphoretic. No erythema. No pallor.   Psychiatric: He has a normal mood and affect. His behavior is normal. Judgment and thought content " normal.     Hospital Outpatient Visit on 10/19/2017   Component Date Value   • Sodium 10/19/2017 140    • Potassium 10/19/2017 4.2    • Chloride 10/19/2017 110    • Co2 10/19/2017 27    • Anion Gap 10/19/2017 3.0    • Glucose 10/19/2017 108*   • Bun 10/19/2017 29*   • Creatinine 10/19/2017 0.74    • Calcium 10/19/2017 9.1    • AST(SGOT) 10/19/2017 28    • ALT(SGPT) 10/19/2017 22    • Alkaline Phosphatase 10/19/2017 64    • Total Bilirubin 10/19/2017 0.7    • Albumin 10/19/2017 4.1    • Total Protein 10/19/2017 6.6    • Globulin 10/19/2017 2.5    • A-G Ratio 10/19/2017 1.6    • Cholesterol,Tot 10/19/2017 142    • Triglycerides 10/19/2017 202*   • HDL 10/19/2017 37*   • LDL 10/19/2017 65    • WBC 10/19/2017 7.4    • RBC 10/19/2017 5.00    • Hemoglobin 10/19/2017 15.1    • Hematocrit 10/19/2017 45.2    • MCV 10/19/2017 90.4    • MCH 10/19/2017 30.2    • MCHC 10/19/2017 33.4*   • RDW 10/19/2017 42.5    • Platelet Count 10/19/2017 149*   • MPV 10/19/2017 10.0    • Neutrophils-Polys 10/19/2017 61.00    • Lymphocytes 10/19/2017 26.80    • Monocytes 10/19/2017 7.40    • Eosinophils 10/19/2017 3.50    • Basophils 10/19/2017 0.90    • Immature Granulocytes 10/19/2017 0.40    • Nucleated RBC 10/19/2017 0.00    • Neutrophils (Absolute) 10/19/2017 4.52    • Lymphs (Absolute) 10/19/2017 1.99    • Monos (Absolute) 10/19/2017 0.55    • Eos (Absolute) 10/19/2017 0.26    • Baso (Absolute) 10/19/2017 0.07    • Immature Granulocytes (a* 10/19/2017 0.03    • NRBC (Absolute) 10/19/2017 0.00    • GFR If  10/19/2017 >60    • GFR If Non  Ameri* 10/19/2017 >60       Lab Results   Component Value Date/Time    HBA1C 5.8 (H) 06/11/2015 11:13 AM    HBA1C 5.8 (H) 03/05/2014 08:34 AM     Lab Results   Component Value Date/Time    SODIUM 140 10/19/2017 09:44 AM    POTASSIUM 4.2 10/19/2017 09:44 AM    CHLORIDE 110 10/19/2017 09:44 AM    CO2 27 10/19/2017 09:44 AM    GLUCOSE 108 (H) 10/19/2017 09:44 AM    BUN 29 (H) 10/19/2017  09:44 AM    CREATININE 0.74 10/19/2017 09:44 AM    CREATININE 1.17 03/07/2011 12:00 AM    BUNCREATRAT 19 03/07/2011 12:00 AM    GLOMRATE >59 03/07/2011 12:00 AM    ALKPHOSPHAT 64 10/19/2017 09:44 AM    ASTSGOT 28 10/19/2017 09:44 AM    ALTSGPT 22 10/19/2017 09:44 AM    TBILIRUBIN 0.7 10/19/2017 09:44 AM     Lab Results   Component Value Date/Time    INR 1.02 05/04/2010 02:28 PM    INR 1.06 11/25/2009 05:45 AM     Lab Results   Component Value Date/Time    CHOLSTRLTOT 142 10/19/2017 09:44 AM    LDL 65 10/19/2017 09:44 AM    HDL 37 (A) 10/19/2017 09:44 AM    TRIGLYCERIDE 202 (H) 10/19/2017 09:44 AM       Lab Results   Component Value Date/Time    TESTOSTERONE 285 (L) 03/05/2014 08:34 AM     No results found for: TSH  Lab Results   Component Value Date/Time    FREET4 0.82 11/07/2013 07:19 AM    FREET4 0.71 11/25/2009 11:15 AM     No results found for: URICACID  No components found for: VITB12  Lab Results   Component Value Date/Time    25HYDROXY 41 10/11/2016 11:22 AM    25HYDROXY 36 05/25/2012 09:40 AM   '          Assessment/Plan:     1. Tobacco dependence   Patient counseled. Encouraged to quit tobacco products. NicoDerm prescribed.      2. IFG (impaired fasting glucose)    Under good control. Continue same regimen.      3. Encounter for screening for lung cancer    Neg ct chest - redo next yr    4. Mixed hyperlipidemia    Under good control. Continue same regimen.  - atorvastatin (LIPITOR) 40 MG Tab; Take 1 Tab by mouth every 48 hours.  Dispense: 45 Tab; Refill: 4  - COMP METABOLIC PANEL; Future  - LIPID PROFILE; Future  - CBC WITH DIFFERENTIAL; Future    5. Primary insomnia        Under good control. Continue same regimen.    30 minute face-to-face encounter took place today.  More than half of this time was spent in the coordination of care of the above problems, as well as counseling.

## 2018-01-08 ENCOUNTER — APPOINTMENT (RX ONLY)
Dept: URBAN - METROPOLITAN AREA CLINIC 4 | Facility: CLINIC | Age: 73
Setting detail: DERMATOLOGY
End: 2018-01-08

## 2018-01-08 DIAGNOSIS — L82.1 OTHER SEBORRHEIC KERATOSIS: ICD-10-CM

## 2018-01-08 PROCEDURE — ? LIQUID NITROGEN

## 2018-01-08 PROCEDURE — 99212 OFFICE O/P EST SF 10 MIN: CPT

## 2018-01-08 ASSESSMENT — LOCATION ZONE DERM: LOCATION ZONE: NECK

## 2018-01-08 ASSESSMENT — LOCATION DETAILED DESCRIPTION DERM
LOCATION DETAILED: LEFT SUPERIOR LATERAL NECK
LOCATION DETAILED: LEFT CENTRAL LATERAL NECK

## 2018-01-08 ASSESSMENT — LOCATION SIMPLE DESCRIPTION DERM: LOCATION SIMPLE: NECK

## 2018-01-08 NOTE — PROCEDURE: LIQUID NITROGEN
Bill Insurance (You Assume Risk Of Denial Or Audit By Selecting Yes): No
Detail Level: Detailed
Medical Necessity Clause: This procedure was medically necessary because the lesions that were treated were:
Medical Necessity Information: It is in your best interest to select a reason for this procedure from the list below. All of these items fulfill various CMS LCD requirements except the new and changing color options.
Duration Of Freeze Thaw-Cycle (Seconds): 0
Post-Care Instructions: I reviewed with the patient in detail post-care instructions. Patient is to wear sunprotection, and avoid picking at any of the treated lesions. Pt may apply Vaseline to crusted or scabbing areas.
Consent: The patient's consent was obtained including but not limited to risks of crusting, scabbing, blistering, scarring, darker or lighter pigmentary change, recurrence, incomplete removal and infection.

## 2018-01-31 ENCOUNTER — PATIENT OUTREACH (OUTPATIENT)
Dept: HEALTH INFORMATION MANAGEMENT | Facility: OTHER | Age: 73
End: 2018-01-31

## 2018-01-31 NOTE — PROGRESS NOTES
Attempt #:1      [facility] Verified: yes  Verify PCP: yes    Communication Preference Obtained: yes     Review Care Team: yes    Annual Wellness Visit Scheduling  1. Scheduling Status:Not Scheduled. Patient states they are not interested DECLINED AWV - HAS OUR NUMBER WHEN READY TO SCHEDULE          Care Gap Scheduling (Attempt to Schedule EACH Overdue Care Gap!)  Health Maintenance Due   Topic Date Due   • IMM ZOSTER VACCINE  04/12/2004         Luminal Activation: declined  Luminal Shiva: no  Virtual Visits: no  Opt In to Text Messages: no

## 2018-03-26 ENCOUNTER — HOSPITAL ENCOUNTER (OUTPATIENT)
Dept: LAB | Facility: MEDICAL CENTER | Age: 73
End: 2018-03-26
Attending: INTERNAL MEDICINE
Payer: MEDICARE

## 2018-03-26 DIAGNOSIS — E78.2 MIXED HYPERLIPIDEMIA: ICD-10-CM

## 2018-03-26 LAB
ALBUMIN SERPL BCP-MCNC: 4.1 G/DL (ref 3.2–4.9)
ALBUMIN/GLOB SERPL: 1.6 G/DL
ALP SERPL-CCNC: 68 U/L (ref 30–99)
ALT SERPL-CCNC: 16 U/L (ref 2–50)
ANION GAP SERPL CALC-SCNC: 6 MMOL/L (ref 0–11.9)
AST SERPL-CCNC: 21 U/L (ref 12–45)
BASOPHILS # BLD AUTO: 1.1 % (ref 0–1.8)
BASOPHILS # BLD: 0.08 K/UL (ref 0–0.12)
BILIRUB SERPL-MCNC: 0.8 MG/DL (ref 0.1–1.5)
BUN SERPL-MCNC: 30 MG/DL (ref 8–22)
CALCIUM SERPL-MCNC: 8.8 MG/DL (ref 8.5–10.5)
CHLORIDE SERPL-SCNC: 107 MMOL/L (ref 96–112)
CHOLEST SERPL-MCNC: 125 MG/DL (ref 100–199)
CO2 SERPL-SCNC: 29 MMOL/L (ref 20–33)
CREAT SERPL-MCNC: 0.97 MG/DL (ref 0.5–1.4)
EOSINOPHIL # BLD AUTO: 0.31 K/UL (ref 0–0.51)
EOSINOPHIL NFR BLD: 4.2 % (ref 0–6.9)
ERYTHROCYTE [DISTWIDTH] IN BLOOD BY AUTOMATED COUNT: 43.8 FL (ref 35.9–50)
GLOBULIN SER CALC-MCNC: 2.5 G/DL (ref 1.9–3.5)
GLUCOSE SERPL-MCNC: 120 MG/DL (ref 65–99)
HCT VFR BLD AUTO: 50.1 % (ref 42–52)
HDLC SERPL-MCNC: 36 MG/DL
HGB BLD-MCNC: 16.5 G/DL (ref 14–18)
IMM GRANULOCYTES # BLD AUTO: 0.03 K/UL (ref 0–0.11)
IMM GRANULOCYTES NFR BLD AUTO: 0.4 % (ref 0–0.9)
LDLC SERPL CALC-MCNC: 57 MG/DL
LYMPHOCYTES # BLD AUTO: 2.02 K/UL (ref 1–4.8)
LYMPHOCYTES NFR BLD: 27.3 % (ref 22–41)
MCH RBC QN AUTO: 30.1 PG (ref 27–33)
MCHC RBC AUTO-ENTMCNC: 32.9 G/DL (ref 33.7–35.3)
MCV RBC AUTO: 91.3 FL (ref 81.4–97.8)
MONOCYTES # BLD AUTO: 0.55 K/UL (ref 0–0.85)
MONOCYTES NFR BLD AUTO: 7.4 % (ref 0–13.4)
NEUTROPHILS # BLD AUTO: 4.4 K/UL (ref 1.82–7.42)
NEUTROPHILS NFR BLD: 59.6 % (ref 44–72)
NRBC # BLD AUTO: 0 K/UL
NRBC BLD-RTO: 0 /100 WBC
PLATELET # BLD AUTO: 151 K/UL (ref 164–446)
PMV BLD AUTO: 10.4 FL (ref 9–12.9)
POTASSIUM SERPL-SCNC: 4.5 MMOL/L (ref 3.6–5.5)
PROT SERPL-MCNC: 6.6 G/DL (ref 6–8.2)
RBC # BLD AUTO: 5.49 M/UL (ref 4.7–6.1)
SODIUM SERPL-SCNC: 142 MMOL/L (ref 135–145)
TRIGL SERPL-MCNC: 161 MG/DL (ref 0–149)
WBC # BLD AUTO: 7.4 K/UL (ref 4.8–10.8)

## 2018-03-26 PROCEDURE — 80053 COMPREHEN METABOLIC PANEL: CPT

## 2018-03-26 PROCEDURE — 85025 COMPLETE CBC W/AUTO DIFF WBC: CPT

## 2018-03-26 PROCEDURE — 80061 LIPID PANEL: CPT

## 2018-03-26 PROCEDURE — 36415 COLL VENOUS BLD VENIPUNCTURE: CPT

## 2018-03-29 ENCOUNTER — TELEPHONE (OUTPATIENT)
Dept: MEDICAL GROUP | Age: 73
End: 2018-03-29

## 2018-03-29 NOTE — TELEPHONE ENCOUNTER
Future Appointments       Provider Department Center    4/5/2018 11:20 AM Chris Hinkle M.D.; [facility] [address]    4/23/2018 1:00 PM Chris Hinkle M.D. [facility] [address]                    Left message for patient to call back regarding pre-visit planning. Please transfer call to [phone].

## 2018-04-05 ENCOUNTER — OFFICE VISIT (OUTPATIENT)
Dept: MEDICAL GROUP | Age: 73
End: 2018-04-05
Payer: MEDICARE

## 2018-04-05 VITALS
HEART RATE: 82 BPM | TEMPERATURE: 97.5 F | WEIGHT: 160 LBS | OXYGEN SATURATION: 95 % | DIASTOLIC BLOOD PRESSURE: 58 MMHG | SYSTOLIC BLOOD PRESSURE: 104 MMHG | HEIGHT: 75 IN | BODY MASS INDEX: 19.89 KG/M2

## 2018-04-05 DIAGNOSIS — N64.4 BREAST PAIN, RIGHT: ICD-10-CM

## 2018-04-05 DIAGNOSIS — F17.210 SMOKING GREATER THAN 30 PACK YEARS: ICD-10-CM

## 2018-04-05 DIAGNOSIS — Z00.00 MEDICARE ANNUAL WELLNESS VISIT, SUBSEQUENT: ICD-10-CM

## 2018-04-05 DIAGNOSIS — M54.2 CHRONIC NECK PAIN: ICD-10-CM

## 2018-04-05 DIAGNOSIS — Z12.2 ENCOUNTER FOR SCREENING FOR LUNG CANCER: ICD-10-CM

## 2018-04-05 DIAGNOSIS — R26.89 LOSS OF BALANCE: ICD-10-CM

## 2018-04-05 DIAGNOSIS — G80.8 OTHER CEREBRAL PALSY (HCC): ICD-10-CM

## 2018-04-05 DIAGNOSIS — G89.29 CHRONIC NECK PAIN: ICD-10-CM

## 2018-04-05 DIAGNOSIS — E78.2 MIXED HYPERLIPIDEMIA: ICD-10-CM

## 2018-04-05 DIAGNOSIS — E23.6 EMPTY SELLA SYNDROME (HCC): ICD-10-CM

## 2018-04-05 DIAGNOSIS — R20.8 BURNING SENSATION: ICD-10-CM

## 2018-04-05 DIAGNOSIS — K13.70 MOUTH LESION: ICD-10-CM

## 2018-04-05 DIAGNOSIS — M47.812 SPONDYLOSIS OF CERVICAL REGION WITHOUT MYELOPATHY OR RADICULOPATHY: ICD-10-CM

## 2018-04-05 DIAGNOSIS — E29.1 HYPOGONADISM MALE: ICD-10-CM

## 2018-04-05 DIAGNOSIS — E55.9 VITAMIN D DEFICIENCY: ICD-10-CM

## 2018-04-05 DIAGNOSIS — M15.9 PRIMARY OSTEOARTHRITIS INVOLVING MULTIPLE JOINTS: ICD-10-CM

## 2018-04-05 DIAGNOSIS — F17.200 TOBACCO DEPENDENCE: ICD-10-CM

## 2018-04-05 DIAGNOSIS — R73.01 IFG (IMPAIRED FASTING GLUCOSE): ICD-10-CM

## 2018-04-05 DIAGNOSIS — Z98.1 S/P CERVICAL SPINAL FUSION: ICD-10-CM

## 2018-04-05 DIAGNOSIS — Z91.81 RISK FOR FALLS: ICD-10-CM

## 2018-04-05 DIAGNOSIS — G25.0 BENIGN ESSENTIAL TREMOR: ICD-10-CM

## 2018-04-05 DIAGNOSIS — F51.01 PRIMARY INSOMNIA: ICD-10-CM

## 2018-04-05 PROBLEM — Z51.89 VISIT FOR WOUND CHECK: Status: RESOLVED | Noted: 2017-08-21 | Resolved: 2018-04-05

## 2018-04-05 PROCEDURE — G0439 PPPS, SUBSEQ VISIT: HCPCS | Performed by: INTERNAL MEDICINE

## 2018-04-05 RX ORDER — PROPRANOLOL HCL 60 MG
60 CAPSULE, EXTENDED RELEASE 24HR ORAL DAILY
Qty: 30 CAP | Refills: 11 | Status: SHIPPED | OUTPATIENT
Start: 2018-04-05 | End: 2018-04-23

## 2018-04-05 ASSESSMENT — PATIENT HEALTH QUESTIONNAIRE - PHQ9
5. POOR APPETITE OR OVEREATING: 0 - NOT AT ALL
CLINICAL INTERPRETATION OF PHQ2 SCORE: 1
SUM OF ALL RESPONSES TO PHQ QUESTIONS 1-9: 4

## 2018-04-05 ASSESSMENT — ACTIVITIES OF DAILY LIVING (ADL): BATHING_REQUIRES_ASSISTANCE: 0

## 2018-04-05 ASSESSMENT — PAIN SCALES - GENERAL: PAINLEVEL: 5=MODERATE PAIN

## 2018-04-05 NOTE — PROGRESS NOTES
Chief Complaint   Patient presents with   • Annual Wellness Visit     SCP         HPI:  Alvaro is a 72 y.o. here for Medicare Annual Wellness Visit          Patient Active Problem List    Diagnosis Date Noted   • Encounter for screening for lung cancer 10/10/2017   • Visit for wound check 08/21/2017   • Primary osteoarthritis involving multiple joints 01/17/2017   • Tobacco dependence 01/17/2017   • Smoking greater than 30 pack years (2/3 pack a day for 52 yrs= 34 pk yrs) 10/17/2016   • IFG (impaired fasting glucose) 04/12/2016   • Vitamin D deficiency 04/12/2016   • Spondylosis of cervical region without myelopathy or radiculopathy 02/25/2016   • Mixed hyperlipidemia 09/14/2015   • Primary insomnia 09/14/2015   • Empty sella syndrome (CMS-HCC) 10/30/2013   • Hypogonadism male 10/30/2013   • Burning sensation 10/07/2013   • S/P cervical spinal fusion 10/07/2013   • Cerebral palsy, unspecified 08/28/2012   • Chronic neck pain- S/P cervical spinal fusion- followed by pain mgt- dr white 07/20/2012       Current Outpatient Prescriptions   Medication Sig Dispense Refill   • atorvastatin (LIPITOR) 40 MG Tab Take 1 Tab by mouth every 48 hours. 45 Tab 4   • zolpidem (AMBIEN) 10 MG Tab      • amitriptyline (ELAVIL) 50 MG Tab One bid. 180 Tab 3   • pregabalin (LYRICA) 100 MG Cap Take 1 Cap by mouth 2 times a day. 180 Cap 3   • methocarbamol (ROBAXIN) 750 MG TABS Take 1 Tab by mouth 3 times a day. 90 Tab 3   • lorazepam (ATIVAN) 1 MG TABS Take 1 Tab by mouth 2 Times a Day. One bid. 60 Tab 3   • aspirin (ASA) 325 MG TABS Take 325 mg by mouth every day.     • gabapentin (NEURONTIN) 100 MG Cap      • hydrocodone/acetaminophen (NORCO)  MG TABS Take 1 Tab by mouth every 8 hours as needed. May fill on or after 7/23/2015. (Patient not taking: Reported on 4/5/2018) 90 Tab 0     No current facility-administered medications for this visit.         Patient is taking medications as noted in medication list.  Current supplements as  per medication list.     Allergies: Tetanus toxoid and Crestor [rosuvastatin]    Current social contact/activities: play poker     Patient's perception of their health: fair    Is patient current with immunizations? No, due for ZOSTAVAX (Shingles). Patient is interested in receiving NONE today.    He  reports that he has been smoking Cigarettes.  He has a 40.50 pack-year smoking history. He has never used smokeless tobacco. He reports that he drinks alcohol. He reports that he does not use drugs.  Ready to quit: Not Answered  Counseling given: Not Answered        DPA/Advanced directive: Patient has Advanced Directive, but it is not on file. Instructed to bring in a copy to scan into their chart.    ROS:    Gait: Uses no assistive device    Ostomy: no    Other tubes: no    Amputations: no     Chronic oxygen use no    Last eye exam 03/2018    Wears hearing aids: no    : Denies any urinary leakage during the last 6 months       Screening:         Depression Screening    Little interest or pleasure in doing things?  0 - not at all  Feeling down, depressed, or hopeless? 1 - several days  Trouble falling or staying asleep, or sleeping too much?  0 - not at all  Feeling tired or having little energy?  3 - nearly every day  Poor appetite or overeating?  0 - not at all  Feeling bad about yourself - or that you are a failure or have let yourself or your family down? 0 - not at all  Trouble concentrating on things, such as reading the newspaper or watching television? 0 - not at all  Moving or speaking so slowly that other people could have noticed.  Or the opposite - being so fidgety or restless that you have been moving around a lot more than usual?  0 - not at all  Thoughts that you would be better off dead, or of hurting yourself?  0 - not at all  Patient Health Questionnaire Score: 4      If depressive symptoms identified deferred to follow up visit unless specifically addressed in assessment and plan.    Interpretation  of PHQ-9 Total Score   Score Severity   1-4 No Depression   5-9 Mild Depression   10-14 Moderate Depression   15-19 Moderately Severe Depression   20-27 Severe Depression      Screening for Cognitive Impairment    Three Minute Recall (apple, watch, magen)  2/3 Table leader sunset    Draw clock face with all 12 numbers set to the hand to show 10 minutes past 11 o'clock  0 Hands  4/5  If cognitive concerns identified, deferred for follow up unless specifically addressed in assessment and plan.    Fall Risk Assessment    Has the patient had two or more falls in the last year or any fall with injury in the last year?  Yes  If fall risk identified, deferred for follow up unless specifically addressed in assessment and plan.      Safety Assessment    Throw rugs on floor.  Yes  Handrails on all stairs.  Yes  Good lighting in all hallways.  Yes  Difficulty hearing.  No  Patient counseled about all safety risks that were identified.    Functional Assessment ADLs    Are there any barriers preventing you from cooking for yourself or meeting nutritional needs?  No.    Are there any barriers preventing you from driving safely or obtaining transportation?  No.    Are there any barriers preventing you from using a telephone or calling for help?  No.    Are there any barriers preventing you from shopping?  No.    Are there any barriers preventing you from taking care of your own finances?  No.    Are there any barriers preventing you from managing your medications?    No.    Are there any barriers preventing you from showering, bathing or dressing yourself?  No.    Are you currently engaging any exercise or physical activity?  Yes.  walk    Health Maintenance Summary                IMM ZOSTER VACCINE Overdue 4/12/2004     Annual Wellness Visit Next Due 4/28/2018      Done 4/27/2017 Visit Dx: Medicare annual wellness visit, subsequent     Patient has more history with this topic...    LUNG CANCER SCREENING Next Due 10/24/2018       Done 10/24/2017 CT-LUNG CANCER-SCREENING     Patient has more history with this topic...    COLONOSCOPY Next Due 1/21/2019      Done 1/21/2014 AMB REFERRAL TO GI FOR COLONOSCOPY          Patient Care Team:  Chris Hinkle M.D. as PCP - General (Internal Medicine)  Chris Flores M.D. as Consulting Physician (Ophthalmology)  Handy Reynoso M.D. as Consulting Physician (Pain Management)  Heath Nix M.D. (Dermatology)  Rogelio Kelly M.D. (Gastroenterology)      Social History   Substance Use Topics   • Smoking status: Current Every Day Smoker     Packs/day: 0.75     Years: 54.00     Types: Cigarettes   • Smokeless tobacco: Never Used      Comment: 1/2 pk a day for 45 yrs   • Alcohol use 0.0 oz/week      Comment: very rarely     Family History   Problem Relation Age of Onset   • Heart Attack Father    • Cancer Mother      intestinal cancer     He  has a past medical history of Arrhythmia; Arthritic-like pain; Arthritis; CAD (coronary artery disease) (7/20/2012); Cerebral palsy (CMS-HCC); Chronic neck pain (7/20/2012); Cold feeling; Dyspnea (7/20/2012); Elevated cholesterol; Fatigue (7/20/2012); Hyperlipidemia (7/20/2012); and Renal disorder.   Past Surgical History:   Procedure Laterality Date   • PB INJ DX/THER AGNT PARAVERT FACET JOINT, CE* Right 2/25/2016    Procedure: INJ PARA FACET CT/ 1 LVL W/IG - C2, 3, 4, 5;  Surgeon: Handy Reynoso M.D.;  Location: SURGERY [facility];  Service: Pain Management   • PB INJ DX/THER AGNT PARAVERT FACET JOINT, CE*  2/25/2016    Procedure: INJ PARA FACET C/T 2D LVL W/IG ;  Surgeon: Handy Reynoso M.D.;  Location: SURGERY [facility];  Service: Pain Management   • PB INJ DX/THER AGNT PARAVERT FACET JOINT, CE*  2/25/2016    Procedure: INJ PARA FACET C/T 3D LVL W/IG;  Surgeon: Handy Reynoso M.D.;  Location: SURGERY [facility];  Service: Pain Management   • CERVICAL DISK AND FUSION ANTERIOR  5/12/2010    Performed by VASHTI FULLER at SURGERY  [facility]   • HAND SURGERY  1996    left hand   • CERVICAL DISK AND FUSION ANTERIOR  1990   • CATARACT EXTRACTION WITH IOL Bilateral     one eye  10 years ago and the other eye 20 years   • TONSILLECTOMY           Exam:     Blood pressure 104/58, pulse 82, temperature 36.4 °C (97.5 °F), height 1.905 m (6' 3\"), weight 72.6 kg (160 lb), SpO2 95 %. Body mass index is 20 kg/m².    Hearing excellent.    Dentition good  Alert, oriented in no acute distress.  Eye contact is good, speech goal directed, affect calm       Assessment and Plan. The following treatment and monitoring plan is recommended:     No diagnosis found.  1. Medicare annual wellness visit, subsequent     - RI ANNUAL WELLNESS VISIT-INCLUDES PPPS SUBSEQUE*    2. Benign essential tremor- ref neuro; trial  beta blockers; r/o parkinsons- NEW PROB-      - REFERRAL TO NEUROLOGY  - propranolol LA (INDERAL LA) 60 MG CAPSULE SR 24 HR; Take 1 Cap by mouth every day.  Dispense: 30 Cap; Refill: 11  - RI ANNUAL WELLNESS VISIT-INCLUDES PPPS SUBSEQUE*    3. Mouth lesion- NEW PROB-   - REFERRAL TO ENT  - RI ANNUAL WELLNESS VISIT-INCLUDES PPPS SUBSEQUE*    4. Loss of balance  NEW POROB     - REFERRAL TO NEUROLOGY  - RI ANNUAL WELLNESS VISIT-INCLUDES PPPS SUBSEQUE*    5. Mixed hyperlipidemia   Under good control. Continue same regimen.    - COMP METABOLIC PANEL; Future  - LIPID PROFILE; Future  - CBC WITH DIFFERENTIAL; Future  - RI ANNUAL WELLNESS VISIT-INCLUDES PPPS SUBSEQUE*    6. Chronic neck pain- S/P cervical spinal fusion- followed by pain mgt- dr white Under good control. Continue same regimen.     - RI ANNUAL WELLNESS VISIT-INCLUDES PPPS SUBSEQUE*    7. Other cerebral palsy (CMS-HCC) Under good control. Continue same regimen.     - RI ANNUAL WELLNESS VISIT-INCLUDES PPPS SUBSEQUE*    8. Burning sensation Under good control. Continue same regimen.     - RI ANNUAL WELLNESS VISIT-INCLUDES PPPS SUBSEQUE*    9. S/P cervical spinal fusion Under good control. " Continue same regimen.   - NM ANNUAL WELLNESS VISIT-INCLUDES PPPS SUBSEQUE*    10. Empty sella syndrome (CMS-HCC) Under good control. Continue same regimen.      - NM ANNUAL WELLNESS VISIT-INCLUDES PPPS SUBSEQUE*    11. Hypogonadism male- no rx Under good control. Continue same regimen.         - NM ANNUAL WELLNESS VISIT-INCLUDES PPPS SUBSEQUE*    12. Primary insomnia    Under good control. Continue same regimen.  - NM ANNUAL WELLNESS VISIT-INCLUDES PPPS SUBSEQUE*    13. Spondylosis of cervical region without myelopathy or radiculopathy      - NM ANNUAL WELLNESS VISIT-INCLUDES PPPS SUBSEQUE*    14. IFG (impaired fasting glucose)     - NM ANNUAL WELLNESS VISIT-INCLUDES PPPS SUBSEQUE*    15. Vitamin D deficiency Under good control. Continue same regimen.     - NM ANNUAL WELLNESS VISIT-INCLUDES PPPS SUBSEQUE*    16. Smoking greater than 30 pack years (2/3 pack a day for 52 yrs= 34 pk yrs)     - NM ANNUAL WELLNESS VISIT-INCLUDES PPPS SUBSEQUE*    17. Primary osteoarthritis involving multiple joints     - NM ANNUAL WELLNESS VISIT-INCLUDES PPPS SUBSEQUE*    18. Tobacco dependence     - NM ANNUAL WELLNESS VISIT-INCLUDES PPPS SUBSEQUE*    19. Encounter for screening for lung cancer     - NM ANNUAL WELLNESS VISIT-INCLUDES PPPS SUBSEQUE*    20. Risk for falls Under good control. Continue same regimen.      - Patient identified as fall risk.  Appropriate orders and counseling given.  - NM ANNUAL WELLNESS VISIT-INCLUDES PPPS SUBSEQUE*    21. Breast pain, right- PROB DUE TO LOW TESTOSTERONE AND FAGING- NO MASS OR SIGNIF TENDERNESS; LINDA 3 MOS  Services suggested: No services needed at this time  Breast pain, rightHealth Care Screening: Age-appropriate preventive services recommended by USPTF and ACIP covered by Medicare were discussed today. Services ordered if indicated and agreed upon by the patient.  Referrals offered: PT/OT/Nutrition counseling/Behavioral Health/Smoking cessation as per orders if indicated.    Discussion  today about general wellness and lifestyle habits:    · Prevent falls and reduce trip hazards; Cautioned about securing or removing rugs.  · Have a working fire alarm and carbon monoxide detector;   · Engage in regular physical activity and social activities       Follow-up: No Follow-up on file.

## 2018-04-19 ENCOUNTER — TELEPHONE (OUTPATIENT)
Dept: MEDICAL GROUP | Age: 73
End: 2018-04-19

## 2018-04-19 NOTE — TELEPHONE ENCOUNTER
Future Appointments       Provider Department Center    4/23/2018 1:00 PM Chris Hinkle M.D. [facility] [address]    7/9/2018 1:00 PM Chris Hinkle M.D. [place] [id number] [address]        ESTABLISHED PATIENT PRE-VISIT PLANNING     Note: Patient will not be contacted if there is no indication to call.     1.  Reviewed notes from the last few office visits within the medical group: Yes    2.  If any orders were placed at last visit or intended to be done for this visit (i.e. 6 mos follow-up), do we have Results/Consult Notes?        •  Labs - Labs ordered, but not to be completed until 7/9/18.   Note: If patient appointment is for lab review and patient did not complete labs, check with provider if OK to reschedule patient until labs completed.       •  Imaging - Imaging was not ordered at last office visit.       •  Referrals - No referrals were ordered at last office visit.    3. Is this appointment scheduled as a Hospital Follow-Up? No    4.  Immunizations were updated in Epic using WebIZ?: Epic matches WebIZ       •  Web Iz Recommendations: Patient is up to date on all vaccines    5.  Patient is due for the following Health Maintenance Topics:   There are no preventive care reminders to display for this patient.    - Patient is up-to-date on all Health Maintenance topics. No records have been requested at this time.    6.  MDX printed for Provider? NO    7.  Patient was NOT informed to arrive 15 min prior to their scheduled appointment and bring in their medication bottles.

## 2018-04-23 ENCOUNTER — OFFICE VISIT (OUTPATIENT)
Dept: MEDICAL GROUP | Age: 73
End: 2018-04-23
Payer: MEDICARE

## 2018-04-23 VITALS
BODY MASS INDEX: 19.77 KG/M2 | OXYGEN SATURATION: 98 % | TEMPERATURE: 97.9 F | HEART RATE: 89 BPM | DIASTOLIC BLOOD PRESSURE: 82 MMHG | SYSTOLIC BLOOD PRESSURE: 126 MMHG | WEIGHT: 159 LBS | HEIGHT: 75 IN

## 2018-04-23 DIAGNOSIS — M54.2 CHRONIC NECK PAIN: ICD-10-CM

## 2018-04-23 DIAGNOSIS — M47.812 SPONDYLOSIS OF CERVICAL REGION WITHOUT MYELOPATHY OR RADICULOPATHY: ICD-10-CM

## 2018-04-23 DIAGNOSIS — E78.2 MIXED HYPERLIPIDEMIA: ICD-10-CM

## 2018-04-23 DIAGNOSIS — F51.01 PRIMARY INSOMNIA: ICD-10-CM

## 2018-04-23 DIAGNOSIS — R73.01 IFG (IMPAIRED FASTING GLUCOSE): ICD-10-CM

## 2018-04-23 DIAGNOSIS — G89.29 CHRONIC NECK PAIN: ICD-10-CM

## 2018-04-23 PROBLEM — K13.70 MOUTH LESION: Status: RESOLVED | Noted: 2018-04-05 | Resolved: 2018-04-23

## 2018-04-23 PROCEDURE — 99214 OFFICE O/P EST MOD 30 MIN: CPT | Performed by: INTERNAL MEDICINE

## 2018-04-23 ASSESSMENT — ENCOUNTER SYMPTOMS
RESPIRATORY NEGATIVE: 1
NEUROLOGICAL NEGATIVE: 1
GASTROINTESTINAL NEGATIVE: 1
PSYCHIATRIC NEGATIVE: 1
MUSCULOSKELETAL NEGATIVE: 1
CONSTITUTIONAL NEGATIVE: 1
CARDIOVASCULAR NEGATIVE: 1
EYES NEGATIVE: 1

## 2018-04-23 NOTE — PROGRESS NOTES
Subjective:      Alvaro Metcalf Jr. is a 73 y.o. male who presents with Follow-Up (lab results)  and The patient is here for followup of chronic medical problems listed below. The patient is compliant with medications and having no side effects from them. Denies chest pain, abdominal pain, dyspnea, myalgias, or cough.   ,  Also patient is here for follow-up of his benign essential tremors and see how he responded to propranolol. However he did not fill or take propranolol because when he got to the pharmacy he could not recall what it was prescribed for. He sees a doing well without it and he will consult with a neurologist as to not to take it or treat with some other medication.    Lesion has cleared spontaneously so he will cancel his referral to ENT      Patient Active Problem List    Diagnosis Date Noted   • Benign essential tremor- ref neuro; trial  beta blockers; r/o parkinsons 04/05/2018   • Loss of balance 04/05/2018   • Risk for falls 04/05/2018   • Breast pain, right 04/05/2018   • Encounter for screening for lung cancer 10/10/2017   • Primary osteoarthritis involving multiple joints 01/17/2017   • Tobacco dependence 01/17/2017   • Smoking greater than 30 pack years (2/3 pack a day for 52 yrs= 34 pk yrs) 10/17/2016   • IFG (impaired fasting glucose) 04/12/2016   • Vitamin D deficiency 04/12/2016   • Spondylosis of cervical region without myelopathy or radiculopathy 02/25/2016   • Mixed hyperlipidemia 09/14/2015   • Primary insomnia 09/14/2015   • Empty sella syndrome (CMS-HCC) 10/30/2013   • Hypogonadism male- no rx 10/30/2013   • Burning sensation 10/07/2013   • S/P cervical spinal fusion 10/07/2013   • Other cerebral palsy (CMS-HCC) 08/28/2012   • Chronic neck pain- S/P cervical spinal fusion- followed by pain mgt- dr white 07/20/2012     Allergies   Allergen Reactions   • Tetanus Toxoid Anaphylaxis   • Crestor [Rosuvastatin]      Gi upset and myalgias     04/23/2018  Outpatient Medications  "Prior to Visit   Medication Sig Dispense Refill   • atorvastatin (LIPITOR) 40 MG Tab Take 1 Tab by mouth every 48 hours. 45 Tab 4   • zolpidem (AMBIEN) 10 MG Tab      • amitriptyline (ELAVIL) 50 MG Tab One bid. 180 Tab 3   • pregabalin (LYRICA) 100 MG Cap Take 1 Cap by mouth 2 times a day. 180 Cap 3   • methocarbamol (ROBAXIN) 750 MG TABS Take 1 Tab by mouth 3 times a day. 90 Tab 3   • lorazepam (ATIVAN) 1 MG TABS Take 1 Tab by mouth 2 Times a Day. One bid. 60 Tab 3   • aspirin (ASA) 325 MG TABS Take 325 mg by mouth every day.     • propranolol LA (INDERAL LA) 60 MG CAPSULE SR 24 HR Take 1 Cap by mouth every day. 30 Cap 11     No facility-administered medications prior to visit.                  HPI    Review of Systems   Constitutional: Negative.    HENT: Negative.    Eyes: Negative.    Respiratory: Negative.    Cardiovascular: Negative.    Gastrointestinal: Negative.    Genitourinary: Negative.    Musculoskeletal: Negative.    Skin: Negative.    Neurological: Negative.    Endo/Heme/Allergies: Negative.    Psychiatric/Behavioral: Negative.           Objective:     /82   Pulse 89   Temp 36.6 °C (97.9 °F)   Ht 1.905 m (6' 3\")   Wt 72.1 kg (159 lb)   SpO2 98%   BMI 19.87 kg/m²      Physical Exam   Constitutional: He is oriented to person, place, and time. He appears well-developed and well-nourished. No distress.   HENT:   Head: Normocephalic and atraumatic.   Right Ear: External ear normal.   Left Ear: External ear normal.   Nose: Nose normal.   Mouth/Throat: Oropharynx is clear and moist. No oropharyngeal exudate.   Eyes: Conjunctivae and EOM are normal. Pupils are equal, round, and reactive to light. Right eye exhibits no discharge. Left eye exhibits no discharge. No scleral icterus.   Neck: Normal range of motion. Neck supple. No JVD present. No tracheal deviation present. No thyromegaly present.   Cardiovascular: Normal rate, regular rhythm, normal heart sounds and intact distal pulses.  Exam reveals " no gallop and no friction rub.    No murmur heard.  Pulmonary/Chest: Effort normal and breath sounds normal. No stridor. No respiratory distress. He has no wheezes. He has no rales. He exhibits no tenderness.   Abdominal: Soft. Bowel sounds are normal. He exhibits no distension and no mass. There is no tenderness. There is no rebound and no guarding.   Musculoskeletal: Normal range of motion. He exhibits no edema or tenderness.   Lymphadenopathy:     He has no cervical adenopathy.   Neurological: He is alert and oriented to person, place, and time. He has normal reflexes. He displays normal reflexes. He exhibits normal muscle tone. Coordination normal.   Skin: Skin is warm and dry. No rash noted. He is not diaphoretic. No erythema. No pallor.   Psychiatric: He has a normal mood and affect. His behavior is normal. Judgment and thought content normal.     Hospital Outpatient Visit on 03/26/2018   Component Date Value   • Sodium 03/26/2018 142    • Potassium 03/26/2018 4.5    • Chloride 03/26/2018 107    • Co2 03/26/2018 29    • Anion Gap 03/26/2018 6.0    • Glucose 03/26/2018 120*   • Bun 03/26/2018 30*   • Creatinine 03/26/2018 0.97    • Calcium 03/26/2018 8.8    • AST(SGOT) 03/26/2018 21    • ALT(SGPT) 03/26/2018 16    • Alkaline Phosphatase 03/26/2018 68    • Total Bilirubin 03/26/2018 0.8    • Albumin 03/26/2018 4.1    • Total Protein 03/26/2018 6.6    • Globulin 03/26/2018 2.5    • A-G Ratio 03/26/2018 1.6    • Cholesterol,Tot 03/26/2018 125    • Triglycerides 03/26/2018 161*   • HDL 03/26/2018 36*   • LDL 03/26/2018 57    • WBC 03/26/2018 7.4    • RBC 03/26/2018 5.49    • Hemoglobin 03/26/2018 16.5    • Hematocrit 03/26/2018 50.1    • MCV 03/26/2018 91.3    • MCH 03/26/2018 30.1    • MCHC 03/26/2018 32.9*   • RDW 03/26/2018 43.8    • Platelet Count 03/26/2018 151*   • MPV 03/26/2018 10.4    • Neutrophils-Polys 03/26/2018 59.60    • Lymphocytes 03/26/2018 27.30    • Monocytes 03/26/2018 7.40    • Eosinophils  03/26/2018 4.20    • Basophils 03/26/2018 1.10    • Immature Granulocytes 03/26/2018 0.40    • Nucleated RBC 03/26/2018 0.00    • Neutrophils (Absolute) 03/26/2018 4.40    • Lymphs (Absolute) 03/26/2018 2.02    • Monos (Absolute) 03/26/2018 0.55    • Eos (Absolute) 03/26/2018 0.31    • Baso (Absolute) 03/26/2018 0.08    • Immature Granulocytes (a* 03/26/2018 0.03    • NRBC (Absolute) 03/26/2018 0.00    • GFR If  03/26/2018 >60    • GFR If Non  Ameri* 03/26/2018 >60       Lab Results   Component Value Date/Time    HBA1C 5.8 (H) 06/11/2015 11:13 AM    HBA1C 5.8 (H) 03/05/2014 08:34 AM     Lab Results   Component Value Date/Time    SODIUM 142 03/26/2018 10:56 AM    POTASSIUM 4.5 03/26/2018 10:56 AM    CHLORIDE 107 03/26/2018 10:56 AM    CO2 29 03/26/2018 10:56 AM    GLUCOSE 120 (H) 03/26/2018 10:56 AM    BUN 30 (H) 03/26/2018 10:56 AM    CREATININE 0.97 03/26/2018 10:56 AM    CREATININE 1.17 03/07/2011 12:00 AM    BUNCREATRAT 19 03/07/2011 12:00 AM    GLOMRATE >59 03/07/2011 12:00 AM    ALKPHOSPHAT 68 03/26/2018 10:56 AM    ASTSGOT 21 03/26/2018 10:56 AM    ALTSGPT 16 03/26/2018 10:56 AM    TBILIRUBIN 0.8 03/26/2018 10:56 AM     Lab Results   Component Value Date/Time    INR 1.02 05/04/2010 02:28 PM    INR 1.06 11/25/2009 05:45 AM     Lab Results   Component Value Date/Time    CHOLSTRLTOT 125 03/26/2018 10:56 AM    LDL 57 03/26/2018 10:56 AM    HDL 36 (A) 03/26/2018 10:56 AM    TRIGLYCERIDE 161 (H) 03/26/2018 10:56 AM       Lab Results   Component Value Date/Time    TESTOSTERONE 285 (L) 03/05/2014 08:34 AM     No results found for: TSH  Lab Results   Component Value Date/Time    FREET4 0.82 11/07/2013 07:19 AM    FREET4 0.71 11/25/2009 11:15 AM     No results found for: URICACID  No components found for: VITB12  Lab Results   Component Value Date/Time    25HYDROXY 41 10/11/2016 11:22 AM    25HYDROXY 36 05/25/2012 09:40 AM               Assessment/Plan:     1. Mixed hyperlipidemia    Under good  control. Continue same regimen.    - TSH; Future  - COMP METABOLIC PANEL; Future  - LIPID PROFILE; Future  - CBC WITH DIFFERENTIAL; Future    2. Primary insomnia      Under good control. Continue same regimen.    3. Chronic neck pain- S/P cervical spinal fusion- followed by pain mgt- dr white    Under good control. Continue same regimen.      4. IFG (impaired fasting glucose)   Under good control. Continue same regimen.  5. Spondylosis of cervical region without myelopathy or radiculopathy       Under good control. Continue same regimen.      30 minute face-to-face encounter took place today.  More than half of this time was spent in the coordination of care of the above problems, as well as counseling.  .

## 2018-05-29 ENCOUNTER — HOSPITAL ENCOUNTER (OUTPATIENT)
Dept: LAB | Facility: MEDICAL CENTER | Age: 73
End: 2018-05-29
Attending: PSYCHIATRY & NEUROLOGY
Payer: MEDICARE

## 2018-05-29 LAB
ERYTHROCYTE [SEDIMENTATION RATE] IN BLOOD BY WESTERGREN METHOD: 1 MM/HOUR (ref 0–20)
FOLATE SERPL-MCNC: >24 NG/ML
T3 SERPL-MCNC: 75.3 NG/DL (ref 60–181)
T4 FREE SERPL-MCNC: 0.64 NG/DL (ref 0.53–1.43)
T4 SERPL-MCNC: 5.2 UG/DL (ref 4–12)
TSH SERPL DL<=0.005 MIU/L-ACNC: 1.68 UIU/ML (ref 0.38–5.33)
VIT B12 SERPL-MCNC: 944 PG/ML (ref 211–911)

## 2018-05-29 PROCEDURE — 82746 ASSAY OF FOLIC ACID SERUM: CPT

## 2018-05-29 PROCEDURE — 36415 COLL VENOUS BLD VENIPUNCTURE: CPT

## 2018-05-29 PROCEDURE — 85652 RBC SED RATE AUTOMATED: CPT

## 2018-05-29 PROCEDURE — 86038 ANTINUCLEAR ANTIBODIES: CPT

## 2018-05-29 PROCEDURE — 82607 VITAMIN B-12: CPT

## 2018-05-29 PROCEDURE — 84443 ASSAY THYROID STIM HORMONE: CPT

## 2018-05-29 PROCEDURE — 84439 ASSAY OF FREE THYROXINE: CPT

## 2018-05-29 PROCEDURE — 84480 ASSAY TRIIODOTHYRONINE (T3): CPT

## 2018-05-31 LAB — NUCLEAR IGG SER QL IA: NORMAL

## 2018-06-07 ENCOUNTER — HOSPITAL ENCOUNTER (OUTPATIENT)
Dept: RADIOLOGY | Facility: MEDICAL CENTER | Age: 73
End: 2018-06-07
Attending: PSYCHIATRY & NEUROLOGY
Payer: MEDICARE

## 2018-06-07 DIAGNOSIS — R41.9 NEUROCOGNITIVE DISORDER: ICD-10-CM

## 2018-06-07 PROCEDURE — 70551 MRI BRAIN STEM W/O DYE: CPT

## 2018-07-26 ENCOUNTER — HOSPITAL ENCOUNTER (EMERGENCY)
Facility: MEDICAL CENTER | Age: 73
End: 2018-07-26
Payer: MEDICARE

## 2018-07-26 VITALS
OXYGEN SATURATION: 93 % | BODY MASS INDEX: 19.95 KG/M2 | HEART RATE: 71 BPM | HEIGHT: 74 IN | WEIGHT: 155.42 LBS | SYSTOLIC BLOOD PRESSURE: 116 MMHG | DIASTOLIC BLOOD PRESSURE: 79 MMHG | TEMPERATURE: 97.9 F | RESPIRATION RATE: 14 BRPM

## 2018-07-26 PROCEDURE — 302449 STATCHG TRIAGE ONLY (STATISTIC)

## 2018-07-26 NOTE — ED NOTES
Pt unable to stay in the ER as he has a previous engagement to attend to. Pt left in stable condition. Pt will go to UC or return back to the ER when he has more time.    (0) independent

## 2018-07-26 NOTE — ED NOTES
".  Chief Complaint   Patient presents with   • Fall Less than 10 Feet     On tuesday pt fell off a chair and hit head on hard cement floor, and had differing pain that started this afternoon.     ./79   Pulse 71   Temp 36.6 °C (97.9 °F)   Resp 14   Ht 1.88 m (6' 2\")   Wt 70.5 kg (155 lb 6.8 oz)   SpO2 93%   BMI 19.96 kg/m²     "

## 2018-07-27 ENCOUNTER — APPOINTMENT (OUTPATIENT)
Dept: RADIOLOGY | Facility: MEDICAL CENTER | Age: 73
End: 2018-07-27
Attending: EMERGENCY MEDICINE
Payer: MEDICARE

## 2018-07-27 ENCOUNTER — HOSPITAL ENCOUNTER (EMERGENCY)
Facility: MEDICAL CENTER | Age: 73
End: 2018-07-27
Attending: EMERGENCY MEDICINE
Payer: MEDICARE

## 2018-07-27 VITALS
HEART RATE: 79 BPM | BODY MASS INDEX: 19.81 KG/M2 | TEMPERATURE: 97.6 F | WEIGHT: 154.32 LBS | SYSTOLIC BLOOD PRESSURE: 137 MMHG | DIASTOLIC BLOOD PRESSURE: 85 MMHG | RESPIRATION RATE: 16 BRPM | HEIGHT: 74 IN | OXYGEN SATURATION: 93 %

## 2018-07-27 DIAGNOSIS — S16.1XXA NECK STRAIN, INITIAL ENCOUNTER: ICD-10-CM

## 2018-07-27 PROCEDURE — 72125 CT NECK SPINE W/O DYE: CPT

## 2018-07-27 PROCEDURE — 99283 EMERGENCY DEPT VISIT LOW MDM: CPT | Mod: 25

## 2018-07-27 PROCEDURE — 70450 CT HEAD/BRAIN W/O DYE: CPT

## 2018-07-27 ASSESSMENT — PAIN SCALES - GENERAL
PAINLEVEL_OUTOF10: 2
PAINLEVEL_OUTOF10: 4
PAINLEVEL_OUTOF10: 4

## 2018-07-27 ASSESSMENT — PAIN SCALES - WONG BAKER: WONGBAKER_NUMERICALRESPONSE: HURTS JUST A LITTLE BIT

## 2018-07-27 NOTE — ED NOTES
"Reports a hx of neck pain and surgeries.  Yesterday he fell off a chair approximately 18 inches, from a standing position.  He denies pain, or LOC and he is following up with our facility to R/O any possible \"new injury\".  He refuses the placement of a C collar.   "

## 2018-07-27 NOTE — ED NOTES
Pt assessed, awaiting ERP eval and CT scan. Updated on POC. Will cont to monitor    [place] fall assessment completed. Pt is High risk for fall. Interventions complete. Pt placed in yellow non slip socks, wrist band placed, green sign on door. Bed locked in low position, call light in place. Personal possessions in place. Personal needs assessed. Charge nurse notified to move pt to a more visible room if available. Safety assessed. Will monitor frequently.

## 2018-07-27 NOTE — DISCHARGE INSTRUCTIONS
Cervical Sprain  A cervical sprain is a stretch or tear in the tissues that connect bones (ligaments) in the neck. Most neck (cervical) sprains get better in 4-6 weeks.  Follow these instructions at home:  If you have a neck collar:  · Wear it as told by your doctor. Do not take off (do not remove) the collar unless your doctor says that this is safe.  · Ask your doctor before adjusting your collar.  · If you have long hair, keep it outside of the collar.  · Ask your doctor if you may take off the collar for cleaning and bathing. If you may take off the collar:  ¨ Follow instructions from your doctor about how to take off the collar safely.  ¨ Clean the collar by wiping it with mild soap and water. Let it air-dry all the way.  ¨ If your collar has removable pads:  § Take the pads out every 1-2 days.  § Hand wash the pads with soap and water.  § Let the pads air-dry all the way before you put them back in the collar. Do not dry them in a clothes dryer. Do not dry them with a hair dryer.  ¨ Check your skin under the collar for irritation or sores. If you see any, tell your doctor.  Managing pain, stiffness, and swelling  · Use a cervical traction device, if told by your doctor.  · If told, put heat on the affected area. Do this before exercises (physical therapy) or as often as told by your doctor. Use the heat source that your doctor recommends, such as a moist heat pack or a heating pad.  ¨ Place a towel between your skin and the heat source.  ¨ Leave the heat on for 20-30 minutes.  ¨ Take the heat off (remove the heat) if your skin turns bright red. This is very important if you cannot feel pain, heat, or cold. You may have a greater risk of getting burned.  · Put ice on the affected area.  ¨ Put ice in a plastic bag.  ¨ Place a towel between your skin and the bag.  ¨ Leave the ice on for 20 minutes, 2-3 times a day.  Activity  · Do not drive while wearing a neck collar. If you do not have a neck collar, ask your  doctor if it is safe to drive.  · Do not drive or use heavy machinery while taking prescription pain medicine or muscle relaxants, unless your doctor approves.  · Do not lift anything that is heavier than 10 lb (4.5 kg) until your doctor tells you that it is safe.  · Rest as told by your doctor.  · Avoid activities that make you feel worse. Ask your doctor what activities are safe for you.  · Do exercises as told by your doctor or physical therapist.  Preventing neck sprain  · Practice good posture. Adjust your workstation to help with this, if needed.  · Exercise regularly as told by your doctor or physical therapist.  · Avoid activities that are risky or may cause a neck sprain (cervical sprain).  General instructions  · Take over-the-counter and prescription medicines only as told by your doctor.  · Do not use any products that contain nicotine or tobacco. This includes cigarettes and e-cigarettes. If you need help quitting, ask your doctor.  · Keep all follow-up visits as told by your doctor. This is important.  Contact a doctor if:  · You have pain or other symptoms that get worse.  · You have symptoms that do not get better after 2 weeks.  · You have pain that does not get better with medicine.  · You start to have new, unexplained symptoms.  · You have sores or irritated skin from wearing your neck collar.  Get help right away if:  · You have very bad pain.  · You have any of the following in any part of your body:  ¨ Loss of feeling (numbness).  ¨ Tingling.  ¨ Weakness.  · You cannot move a part of your body (you have paralysis).  · Your activity level does not improve.  Summary  · A cervical sprain is a stretch or tear in the tissues that connect bones (ligaments) in the neck.  · If you have a neck (cervical) collar, do not take off the collar unless your doctor says that this is safe.  · Put ice on affected areas as told by your doctor.  · Put heat on affected areas as told by your doctor.  · Good posture  and regular exercise can help prevent a neck sprain from happening again.  This information is not intended to replace advice given to you by your health care provider. Make sure you discuss any questions you have with your health care provider.  Document Released: 06/05/2009 Document Revised: 08/29/2017 Document Reviewed: 08/29/2017  [employer] Interactive Patient Education © 2017 [employer]

## 2018-07-27 NOTE — ED PROVIDER NOTES
ED Provider Note    CHIEF COMPLAINT  Chief Complaint   Patient presents with   • T-5000 FALL   Head injury, neck pain    HPI  Alvaro Metcalf Jr. is a 73 y.o. male who presents complaining of headache and right-sided neck pain and arm pain after falling off a chair 2 days ago.  Patient has a history of cerebral palsy as well as a bilevel cervical spine fusion.  He states he has chronic pain weakness and numbness to the right upper extremity.  He does not feel that this is gotten worse but is concerned about a possible injury.  He did not lose consciousness but he did see stars.  He states he is having headache.  He denies nausea or vomiting.  Patient states he first landed on his buttock, then his back, that his headset hit the cement floor.    REVIEW OF SYSTEMS  See HPI for further details.  No chest pain no abdominal pain.  No cough or cold symptoms all other systems are negative.    PAST MEDICAL HISTORY  Past Medical History:   Diagnosis Date   • Arrhythmia    • Arthritic-like pain    • Arthritis    • CAD (coronary artery disease) 7/20/2012   • Cerebral palsy (HCC)     right side affected   • Chronic neck pain 7/20/2012   • Cold feeling     skin chills, always cold   • Dyspnea 7/20/2012   • Elevated cholesterol    • Fatigue 7/20/2012   • Hyperlipidemia 7/20/2012   • Renal disorder     stones       FAMILY HISTORY  Family History   Problem Relation Age of Onset   • Heart Attack Father    • Cancer Mother         intestinal cancer       SOCIAL HISTORY  Social History     Social History   • Marital status:      Spouse name: N/A   • Number of children: N/A   • Years of education: N/A     Social History Main Topics   • Smoking status: Current Every Day Smoker     Packs/day: 0.75     Years: 54.00     Types: Cigarettes   • Smokeless tobacco: Never Used      Comment: 1/2 pk a day for 45 yrs   • Alcohol use 0.0 oz/week      Comment: Rarely   • Drug use: No   • Sexual activity: No     Other Topics Concern   •  "Not on file     Social History Narrative   • No narrative on file       SURGICAL HISTORY  Past Surgical History:   Procedure Laterality Date   • PB INJ DX/THER AGNT PARAVERT FACET JOINT, CE* Right 2/25/2016    Procedure: INJ PARA FACET CT/ 1 LVL W/IG - C2, 3, 4, 5;  Surgeon: Handy Reynoso M.D.;  Location: [facility];  Service: Pain Management   • PB INJ DX/THER AGNT PARAVERT FACET JOINT, CE*  2/25/2016    Procedure: INJ PARA FACET C/T 2D LVL W/IG ;  Surgeon: Handy Reynoso M.D.;  Location: SURGERY [facility];  Service: Pain Management   • PB INJ DX/THER AGNT PARAVERT FACET JOINT, CE*  2/25/2016    Procedure: INJ PARA FACET C/T 3D LVL W/IG;  Surgeon: Handy Reynoso M.D.;  Location: [facility];  Service: Pain Management   • CERVICAL DISK AND FUSION ANTERIOR  5/12/2010    Performed by VASHTI FULLER at SURGERY [facility]   • HAND SURGERY  1996    left hand   • CERVICAL DISK AND FUSION ANTERIOR  1990   • CATARACT EXTRACTION WITH IOL Bilateral     one eye  10 years ago and the other eye 20 years   • TONSILLECTOMY         CURRENT MEDICATIONS  Home Medications    **Home medications have not yet been reviewed for this encounter**         ALLERGIES  Allergies   Allergen Reactions   • Tetanus Toxoid Anaphylaxis   • Crestor [Rosuvastatin]      Gi upset and myalgias       PHYSICAL EXAM  VITAL SIGNS: /73   Pulse 86   Temp 36.4 °C (97.6 °F)   Resp 18   Ht 1.88 m (6' 2\")   Wt 70 kg (154 lb 5.2 oz)   SpO2 93%   BMI 19.81 kg/m²   Constitutional:  Well developed, Well nourished, No acute distress,   HENT: Moderate right-sided paraspinal muscle tenderness with right occipital tenderness.  Eyes: PERRLA, EOMI, Conjunctiva normal,  Neck: Normal range of motion, positive right-sided paraspinal muscle tenderness  Lymphatic: No lymphadenopathy noted.   Cardiovascular: Normal heart rate, Normal rhythm, No murmurs,   Thorax & Lungs: Normal breath sounds, No respiratory distress, No " wheezing, No chest tenderness.   Skin: Warm, Dry, No erythema, No rash.   Extremities: No edema, No tenderness, No cyanosis, No clubbing. Dorsalis pedis pulses 2+ equal bilaterally. Radial pulses 2+ equal bilaterally  Neurologic: Alert & oriented x 3, mild right upper extremity weakness, normal gait.   Psychiatric: Affect normal, Judgment normal, Mood normal.     RADIOLOGY/PROCEDURES  CT-CSPINE WITHOUT PLUS RECONS   Final Result      No CT evidence of acute cervical spine abnormality.      C2-C4 and C5-C6 mature fusion      Moderate C4/5 and C6/7 degenerative disc disease, facet arthropathy and this results in slight listhesis as above      CT-HEAD W/O   Final Result      No noncontrast CT evidence of acute intracranial hemorrhage.      Stable chronic lacunar infarctions, nonspecific white matter disease and cerebral volume loss.            COURSE & MEDICAL DECISION MAKING  Pertinent Labs & Imaging studies reviewed. (See chart for details)  Patient has had a fall off a chair.  Differential diagnosis includes head injury versus fracture versus chronic subdural.  CT scan was negative for any acute pathology.  Patient was reassured and discharged home in stable condition    FINAL IMPRESSION  1.  Neck strain  2.  Head injury  3.        Electronically signed by: Navin Jarrett, 7/27/2018 2:30 PM

## 2018-10-04 ENCOUNTER — HOSPITAL ENCOUNTER (OUTPATIENT)
Dept: LAB | Facility: MEDICAL CENTER | Age: 73
End: 2018-10-04
Attending: INTERNAL MEDICINE
Payer: MEDICARE

## 2018-10-04 DIAGNOSIS — E78.2 MIXED HYPERLIPIDEMIA: ICD-10-CM

## 2018-10-04 LAB
ALBUMIN SERPL BCP-MCNC: 4 G/DL (ref 3.2–4.9)
ALBUMIN/GLOB SERPL: 1.4 G/DL
ALP SERPL-CCNC: 67 U/L (ref 30–99)
ALT SERPL-CCNC: 16 U/L (ref 2–50)
ANION GAP SERPL CALC-SCNC: 5 MMOL/L (ref 0–11.9)
AST SERPL-CCNC: 20 U/L (ref 12–45)
BASOPHILS # BLD AUTO: 1.1 % (ref 0–1.8)
BASOPHILS # BLD: 0.08 K/UL (ref 0–0.12)
BILIRUB SERPL-MCNC: 0.6 MG/DL (ref 0.1–1.5)
BUN SERPL-MCNC: 30 MG/DL (ref 8–22)
CALCIUM SERPL-MCNC: 9.2 MG/DL (ref 8.5–10.5)
CHLORIDE SERPL-SCNC: 108 MMOL/L (ref 96–112)
CHOLEST SERPL-MCNC: 127 MG/DL (ref 100–199)
CO2 SERPL-SCNC: 29 MMOL/L (ref 20–33)
CREAT SERPL-MCNC: 0.93 MG/DL (ref 0.5–1.4)
EOSINOPHIL # BLD AUTO: 0.38 K/UL (ref 0–0.51)
EOSINOPHIL NFR BLD: 5.3 % (ref 0–6.9)
ERYTHROCYTE [DISTWIDTH] IN BLOOD BY AUTOMATED COUNT: 42.5 FL (ref 35.9–50)
FASTING STATUS PATIENT QL REPORTED: NORMAL
GLOBULIN SER CALC-MCNC: 2.9 G/DL (ref 1.9–3.5)
GLUCOSE SERPL-MCNC: 130 MG/DL (ref 65–99)
HCT VFR BLD AUTO: 48.5 % (ref 42–52)
HDLC SERPL-MCNC: 37 MG/DL
HGB BLD-MCNC: 15.8 G/DL (ref 14–18)
IMM GRANULOCYTES # BLD AUTO: 0.04 K/UL (ref 0–0.11)
IMM GRANULOCYTES NFR BLD AUTO: 0.6 % (ref 0–0.9)
LDLC SERPL CALC-MCNC: 60 MG/DL
LYMPHOCYTES # BLD AUTO: 2.14 K/UL (ref 1–4.8)
LYMPHOCYTES NFR BLD: 30.1 % (ref 22–41)
MCH RBC QN AUTO: 29.7 PG (ref 27–33)
MCHC RBC AUTO-ENTMCNC: 32.6 G/DL (ref 33.7–35.3)
MCV RBC AUTO: 91.2 FL (ref 81.4–97.8)
MONOCYTES # BLD AUTO: 0.56 K/UL (ref 0–0.85)
MONOCYTES NFR BLD AUTO: 7.9 % (ref 0–13.4)
NEUTROPHILS # BLD AUTO: 3.92 K/UL (ref 1.82–7.42)
NEUTROPHILS NFR BLD: 55 % (ref 44–72)
NRBC # BLD AUTO: 0 K/UL
NRBC BLD-RTO: 0 /100 WBC
PLATELET # BLD AUTO: 154 K/UL (ref 164–446)
PMV BLD AUTO: 10 FL (ref 9–12.9)
POTASSIUM SERPL-SCNC: 4.5 MMOL/L (ref 3.6–5.5)
PROT SERPL-MCNC: 6.9 G/DL (ref 6–8.2)
RBC # BLD AUTO: 5.32 M/UL (ref 4.7–6.1)
SODIUM SERPL-SCNC: 142 MMOL/L (ref 135–145)
TRIGL SERPL-MCNC: 148 MG/DL (ref 0–149)
WBC # BLD AUTO: 7.1 K/UL (ref 4.8–10.8)

## 2018-10-04 PROCEDURE — 80053 COMPREHEN METABOLIC PANEL: CPT

## 2018-10-04 PROCEDURE — 80061 LIPID PANEL: CPT

## 2018-10-04 PROCEDURE — 84443 ASSAY THYROID STIM HORMONE: CPT

## 2018-10-04 PROCEDURE — 85025 COMPLETE CBC W/AUTO DIFF WBC: CPT

## 2018-10-04 PROCEDURE — 36415 COLL VENOUS BLD VENIPUNCTURE: CPT

## 2018-10-05 LAB — TSH SERPL DL<=0.005 MIU/L-ACNC: 2.5 UIU/ML (ref 0.38–5.33)

## 2018-10-15 ENCOUNTER — OFFICE VISIT (OUTPATIENT)
Dept: MEDICAL GROUP | Age: 73
End: 2018-10-15
Payer: MEDICARE

## 2018-10-15 VITALS
WEIGHT: 157 LBS | OXYGEN SATURATION: 96 % | BODY MASS INDEX: 20.15 KG/M2 | TEMPERATURE: 98.2 F | SYSTOLIC BLOOD PRESSURE: 116 MMHG | HEART RATE: 86 BPM | DIASTOLIC BLOOD PRESSURE: 72 MMHG | HEIGHT: 74 IN

## 2018-10-15 DIAGNOSIS — R53.83 OTHER FATIGUE: ICD-10-CM

## 2018-10-15 DIAGNOSIS — S06.0X0A CONCUSSION WITHOUT LOSS OF CONSCIOUSNESS, INITIAL ENCOUNTER: ICD-10-CM

## 2018-10-15 DIAGNOSIS — G25.0 BENIGN ESSENTIAL TREMOR: ICD-10-CM

## 2018-10-15 DIAGNOSIS — E78.2 MIXED HYPERLIPIDEMIA: ICD-10-CM

## 2018-10-15 DIAGNOSIS — R52 BURNING PAIN: ICD-10-CM

## 2018-10-15 DIAGNOSIS — F17.200 TOBACCO DEPENDENCE: ICD-10-CM

## 2018-10-15 DIAGNOSIS — R73.01 IFG (IMPAIRED FASTING GLUCOSE): ICD-10-CM

## 2018-10-15 DIAGNOSIS — F51.01 PRIMARY INSOMNIA: ICD-10-CM

## 2018-10-15 PROCEDURE — 99214 OFFICE O/P EST MOD 30 MIN: CPT | Performed by: INTERNAL MEDICINE

## 2018-10-15 RX ORDER — PREGABALIN 75 MG/1
75 CAPSULE ORAL 3 TIMES DAILY
Qty: 90 CAP | Refills: 6 | Status: SHIPPED | DISCHARGE
Start: 2018-10-15 | End: 2019-01-01 | Stop reason: SDUPTHER

## 2018-10-15 ASSESSMENT — ENCOUNTER SYMPTOMS
PSYCHIATRIC NEGATIVE: 1
RESPIRATORY NEGATIVE: 1
CARDIOVASCULAR NEGATIVE: 1
GASTROINTESTINAL NEGATIVE: 1
CONSTITUTIONAL NEGATIVE: 1
NEUROLOGICAL NEGATIVE: 1
EYES NEGATIVE: 1
MUSCULOSKELETAL NEGATIVE: 1

## 2018-10-15 NOTE — PROGRESS NOTES
Subjective:      Alvaro Metcalf Jr. is a 73 y.o. male who presents with Follow-Up (lab review)  The patient is here for followup of chronic medical problems listed below. The patient is compliant with medications and having no side effects from them. Denies chest pain, abdominal pain, dyspnea, myalgias, or cough.   Patient Active Problem List    Diagnosis Date Noted   • Concussion with no loss of consciousness 10/15/2018   • Benign essential tremor- ref neuro; trial  beta blockers; r/o parkinsons 04/05/2018   • Loss of balance 04/05/2018   • Risk for falls 04/05/2018   • Breast pain, right 04/05/2018   • Encounter for screening for lung cancer 10/10/2017   • Primary osteoarthritis involving multiple joints 01/17/2017   • Tobacco dependence 01/17/2017   • Smoking greater than 30 pack years (2/3 pack a day for 52 yrs= 34 pk yrs) 10/17/2016   • IFG (impaired fasting glucose) 04/12/2016   • Vitamin D deficiency 04/12/2016   • Spondylosis of cervical region without myelopathy or radiculopathy 02/25/2016   • Mixed hyperlipidemia 09/14/2015   • Primary insomnia 09/14/2015   • Empty sella syndrome (HCC) 10/30/2013   • Hypogonadism male- no rx 10/30/2013   • Burning sensation 10/07/2013   • S/P cervical spinal fusion 10/07/2013   • Other cerebral palsy (HCC) 08/28/2012   • Chronic neck pain- S/P cervical spinal fusion- followed by pain mgt- dr white 07/20/2012     .  Outpatient Medications Prior to Visit   Medication Sig Dispense Refill   • atorvastatin (LIPITOR) 40 MG Tab Take 1 Tab by mouth every 48 hours. 45 Tab 4   • zolpidem (AMBIEN) 10 MG Tab      • amitriptyline (ELAVIL) 50 MG Tab One bid. 180 Tab 3   • methocarbamol (ROBAXIN) 750 MG TABS Take 1 Tab by mouth 3 times a day. 90 Tab 3   • lorazepam (ATIVAN) 1 MG TABS Take 1 Tab by mouth 2 Times a Day. One bid. 60 Tab 3   • aspirin (ASA) 325 MG TABS Take 325 mg by mouth every day.     • pregabalin (LYRICA) 100 MG Cap Take 1 Cap by mouth 2 times a day. 180 Cap 3  "    No facility-administered medications prior to visit.      Allergies   Allergen Reactions   • Tetanus Toxoid Anaphylaxis   • Crestor [Rosuvastatin]      Gi upset and myalgias                 HPI    Review of Systems   Constitutional: Negative.    HENT: Negative.    Eyes: Negative.    Respiratory: Negative.    Cardiovascular: Negative.    Gastrointestinal: Negative.    Genitourinary: Negative.    Musculoskeletal: Negative.    Skin: Negative.    Neurological: Negative.    Endo/Heme/Allergies: Negative.    Psychiatric/Behavioral: Negative.           Objective:     /72 (BP Location: Left arm, Patient Position: Sitting)   Pulse 86   Temp 36.8 °C (98.2 °F) (Temporal)   Ht 1.88 m (6' 2.02\")   Wt 71.2 kg (157 lb)   SpO2 96%   BMI 20.15 kg/m²      Physical Exam   Constitutional: He is oriented to person, place, and time. He appears well-developed and well-nourished. No distress.   HENT:   Head: Normocephalic and atraumatic.   Right Ear: External ear normal.   Left Ear: External ear normal.   Nose: Nose normal.   Mouth/Throat: Oropharynx is clear and moist. No oropharyngeal exudate.   Eyes: Pupils are equal, round, and reactive to light. Conjunctivae and EOM are normal. Right eye exhibits no discharge. Left eye exhibits no discharge. No scleral icterus.   Neck: Normal range of motion. Neck supple. No JVD present. No tracheal deviation present. No thyromegaly present.   Cardiovascular: Normal rate, regular rhythm, normal heart sounds and intact distal pulses.  Exam reveals no gallop and no friction rub.    No murmur heard.  Pulmonary/Chest: Effort normal and breath sounds normal. No stridor. No respiratory distress. He has no wheezes. He has no rales. He exhibits no tenderness.   Abdominal: Soft. Bowel sounds are normal. He exhibits no distension and no mass. There is no tenderness. There is no rebound and no guarding.   Musculoskeletal: Normal range of motion. He exhibits no edema or tenderness. "   Lymphadenopathy:     He has no cervical adenopathy.   Neurological: He is alert and oriented to person, place, and time. He has normal reflexes. He displays normal reflexes. He exhibits normal muscle tone. Coordination normal.   Skin: Skin is warm and dry. No rash noted. He is not diaphoretic. No erythema. No pallor.   Psychiatric: He has a normal mood and affect. His behavior is normal. Judgment and thought content normal.     Hospital Outpatient Visit on 10/04/2018   Component Date Value   • TSH 10/04/2018 2.500    • Sodium 10/04/2018 142    • Potassium 10/04/2018 4.5    • Chloride 10/04/2018 108    • Co2 10/04/2018 29    • Anion Gap 10/04/2018 5.0    • Glucose 10/04/2018 130*   • Bun 10/04/2018 30*   • Creatinine 10/04/2018 0.93    • Calcium 10/04/2018 9.2    • AST(SGOT) 10/04/2018 20    • ALT(SGPT) 10/04/2018 16    • Alkaline Phosphatase 10/04/2018 67    • Total Bilirubin 10/04/2018 0.6    • Albumin 10/04/2018 4.0    • Total Protein 10/04/2018 6.9    • Globulin 10/04/2018 2.9    • A-G Ratio 10/04/2018 1.4    • Cholesterol,Tot 10/04/2018 127    • Triglycerides 10/04/2018 148    • HDL 10/04/2018 37*   • LDL 10/04/2018 60    • WBC 10/04/2018 7.1    • RBC 10/04/2018 5.32    • Hemoglobin 10/04/2018 15.8    • Hematocrit 10/04/2018 48.5    • MCV 10/04/2018 91.2    • MCH 10/04/2018 29.7    • MCHC 10/04/2018 32.6*   • RDW 10/04/2018 42.5    • Platelet Count 10/04/2018 154*   • MPV 10/04/2018 10.0    • Neutrophils-Polys 10/04/2018 55.00    • Lymphocytes 10/04/2018 30.10    • Monocytes 10/04/2018 7.90    • Eosinophils 10/04/2018 5.30    • Basophils 10/04/2018 1.10    • Immature Granulocytes 10/04/2018 0.60    • Nucleated RBC 10/04/2018 0.00    • Neutrophils (Absolute) 10/04/2018 3.92    • Lymphs (Absolute) 10/04/2018 2.14    • Monos (Absolute) 10/04/2018 0.56    • Eos (Absolute) 10/04/2018 0.38    • Baso (Absolute) 10/04/2018 0.08    • Immature Granulocytes (a* 10/04/2018 0.04    • NRBC (Absolute) 10/04/2018 0.00    •  Fasting Status 10/04/2018 Fasting    • GFR If  10/04/2018 >60    • GFR If Non  [employer]meri* 10/04/2018 >60       Lab Results   Component Value Date/Time    HBA1C 5.8 (H) 06/11/2015 11:13 AM    HBA1C 5.8 (H) 03/05/2014 08:34 AM     Lab Results   Component Value Date/Time    SODIUM 142 10/04/2018 10:51 AM    POTASSIUM 4.5 10/04/2018 10:51 AM    CHLORIDE 108 10/04/2018 10:51 AM    CO2 29 10/04/2018 10:51 AM    GLUCOSE 130 (H) 10/04/2018 10:51 AM    BUN 30 (H) 10/04/2018 10:51 AM    CREATININE 0.93 10/04/2018 10:51 AM    CREATININE 1.17 03/07/2011 12:00 AM    BUNCREATRAT 19 03/07/2011 12:00 AM    GLOMRATE >59 03/07/2011 12:00 AM    ALKPHOSPHAT 67 10/04/2018 10:51 AM    ASTSGOT 20 10/04/2018 10:51 AM    ALTSGPT 16 10/04/2018 10:51 AM    TBILIRUBIN 0.6 10/04/2018 10:51 AM     Lab Results   Component Value Date/Time    INR 1.02 05/04/2010 02:28 PM    INR 1.06 11/25/2009 05:45 AM     Lab Results   Component Value Date/Time    CHOLSTRLTOT 127 10/04/2018 10:51 AM    LDL 60 10/04/2018 10:51 AM    HDL 37 (A) 10/04/2018 10:51 AM    TRIGLYCERIDE 148 10/04/2018 10:51 AM       Lab Results   Component Value Date/Time    TESTOSTERONE 285 (L) 03/05/2014 08:34 AM     No results found for: TSH  Lab Results   Component Value Date/Time    FREET4 0.64 05/29/2018 02:33 PM    FREET4 0.82 11/07/2013 07:19 AM     No results found for: URICACID  No components found for: VITB12  Lab Results   Component Value Date/Time    25HYDROXY 41 10/11/2016 11:22 AM    25HYDROXY 36 05/25/2012 09:40 AM               Assessment/Plan:     1. Burning pain     Under good control. Continue same regimen.    - pregabalin (LYRICA) 75 MG Cap; Take 1 Cap by mouth 3 times a day.  Dispense: 90 Cap; Refill: 6    2. Mixed hyperlipidemiaUnder good control. Continue same regimen.    - COMP METABOLIC PANEL; Future  - LIPID PROFILE; Future  - CBC WITH DIFFERENTIAL; Future    3. Primary insomniaUnder good control. Continue same regimen.       4. Tobacco  dependence   Patient counseled. Encouraged to quit tobacco products. NicoDerm prescribed.      5. Benign essential tremor- ref neuro; trial  beta blockers; r/o parkinsons   Under good control. Continue same regimen.    6. IFG (impaired fasting glucose)    Under good control. Continue same regimen.  - HEMOGLOBIN A1C; Future    7. Other fatigue- new prob     - TSH; Future    8. Concussion without loss of consciousness, initial encounter       reslved    30 minute face-to-face encounter took place today.  More than half of this time was spent in the coordination of care of the above problems, as well as counseling.

## 2018-11-20 ENCOUNTER — APPOINTMENT (RX ONLY)
Dept: URBAN - METROPOLITAN AREA CLINIC 4 | Facility: CLINIC | Age: 73
Setting detail: DERMATOLOGY
End: 2018-11-20

## 2018-11-20 DIAGNOSIS — L81.4 OTHER MELANIN HYPERPIGMENTATION: ICD-10-CM

## 2018-11-20 DIAGNOSIS — D18.0 HEMANGIOMA: ICD-10-CM

## 2018-11-20 DIAGNOSIS — L20.89 OTHER ATOPIC DERMATITIS: ICD-10-CM

## 2018-11-20 DIAGNOSIS — L82.1 OTHER SEBORRHEIC KERATOSIS: ICD-10-CM

## 2018-11-20 DIAGNOSIS — D22 MELANOCYTIC NEVI: ICD-10-CM

## 2018-11-20 DIAGNOSIS — I87.2 VENOUS INSUFFICIENCY (CHRONIC) (PERIPHERAL): ICD-10-CM

## 2018-11-20 DIAGNOSIS — Z85.828 PERSONAL HISTORY OF OTHER MALIGNANT NEOPLASM OF SKIN: ICD-10-CM

## 2018-11-20 DIAGNOSIS — L82.0 INFLAMED SEBORRHEIC KERATOSIS: ICD-10-CM

## 2018-11-20 PROBLEM — L20.84 INTRINSIC (ALLERGIC) ECZEMA: Status: ACTIVE | Noted: 2018-11-20

## 2018-11-20 PROBLEM — D18.01 HEMANGIOMA OF SKIN AND SUBCUTANEOUS TISSUE: Status: ACTIVE | Noted: 2018-11-20

## 2018-11-20 PROBLEM — D22.5 MELANOCYTIC NEVI OF TRUNK: Status: ACTIVE | Noted: 2018-11-20

## 2018-11-20 PROBLEM — M12.9 ARTHROPATHY, UNSPECIFIED: Status: ACTIVE | Noted: 2018-11-20

## 2018-11-20 PROCEDURE — 99213 OFFICE O/P EST LOW 20 MIN: CPT

## 2018-11-20 PROCEDURE — ? LIQUID NITROGEN (COSMETIC)

## 2018-11-20 PROCEDURE — ? ADDITIONAL NOTES

## 2018-11-20 PROCEDURE — ? COUNSELING

## 2018-11-20 PROCEDURE — ? PRESCRIPTION

## 2018-11-20 RX ORDER — TRIAMCINOLONE ACETONIDE 1 MG/G
CREAM TOPICAL
Qty: 1 | Refills: 3 | Status: ERX | COMMUNITY
Start: 2018-11-20

## 2018-11-20 RX ADMIN — TRIAMCINOLONE ACETONIDE: 1 CREAM TOPICAL at 00:00

## 2018-11-20 ASSESSMENT — LOCATION DETAILED DESCRIPTION DERM
LOCATION DETAILED: 1ST WEB SPACE RIGHT HAND
LOCATION DETAILED: INFERIOR THORACIC SPINE
LOCATION DETAILED: LEFT PROXIMAL PRETIBIAL REGION
LOCATION DETAILED: RIGHT PROXIMAL PRETIBIAL REGION
LOCATION DETAILED: RIGHT PROXIMAL DORSAL FOREARM
LOCATION DETAILED: RIGHT MEDIAL UPPER BACK
LOCATION DETAILED: LEFT POSTERIOR SHOULDER
LOCATION DETAILED: RIGHT INFERIOR MEDIAL UPPER BACK
LOCATION DETAILED: LEFT MID-UPPER BACK
LOCATION DETAILED: LEFT SUPERIOR LATERAL UPPER BACK

## 2018-11-20 ASSESSMENT — LOCATION ZONE DERM
LOCATION ZONE: LEG
LOCATION ZONE: TRUNK
LOCATION ZONE: FINGER
LOCATION ZONE: ARM

## 2018-11-20 ASSESSMENT — LOCATION SIMPLE DESCRIPTION DERM
LOCATION SIMPLE: LEFT SHOULDER
LOCATION SIMPLE: LEFT PRETIBIAL REGION
LOCATION SIMPLE: LEFT UPPER BACK
LOCATION SIMPLE: RIGHT THUMB
LOCATION SIMPLE: UPPER BACK
LOCATION SIMPLE: RIGHT UPPER BACK
LOCATION SIMPLE: RIGHT FOREARM
LOCATION SIMPLE: RIGHT PRETIBIAL REGION

## 2018-11-26 ENCOUNTER — TELEPHONE (OUTPATIENT)
Dept: MEDICAL GROUP | Age: 73
End: 2018-11-26

## 2018-11-26 DIAGNOSIS — Z12.2 ENCOUNTER FOR SCREENING FOR LUNG CANCER: ICD-10-CM

## 2018-11-26 DIAGNOSIS — F17.210 SMOKING GREATER THAN 30 PACK YEARS: ICD-10-CM

## 2018-11-27 ENCOUNTER — TELEPHONE (OUTPATIENT)
Dept: HEMATOLOGY ONCOLOGY | Facility: MEDICAL CENTER | Age: 73
End: 2018-11-27

## 2018-11-27 ENCOUNTER — PATIENT OUTREACH (OUTPATIENT)
Dept: OTHER | Facility: MEDICAL CENTER | Age: 73
End: 2018-11-27

## 2018-11-27 NOTE — TELEPHONE ENCOUNTER
----- Message from Shaunna Hyatt R.N. sent at 11/20/2018  1:56 PM PST -----  Regarding: Lung Cancer Screening CT due  Hi Dr Hinkle:     Our records indicate that your patient is due for his annual LDCT lung cancer screening.   Patients DO NOT need to repeat the Shared Decision Making (SDM) with our Lung Cancer Screening program APRN each year.       Your patient DOES need to be prescreened for eligibility annually.  I have sent you a reference guide by Email  for ordering  - see page #2 for ordering annual screenings. You need to order the follow-up lung cancer screening in EPIC - through [facility], class: Ancillary Performed.     Once ordered I can send a letter out to encourage the patient to schedule.     Let me know if you have any questions or how I might help.     Thanks!   Arleen Hyatt RN BSN   Lung Cancer Screening Program   [phone]       Eligibility for lung cancer screening program is based on best practice guidelines approved through the [facility]:   Age 55-77 yrs of age   30 pack year hx of smoking, or greater   Current smoker or if quit, must have quit within last 15 yrs   Asymptomatic (no signs or symptoms of lung cancer)     No previous history of lung cancer   No Chest CT within past 12 mos.

## 2018-12-14 ENCOUNTER — OFFICE VISIT (OUTPATIENT)
Dept: URGENT CARE | Facility: CLINIC | Age: 73
End: 2018-12-14
Payer: MEDICARE

## 2018-12-14 VITALS
BODY MASS INDEX: 20.15 KG/M2 | WEIGHT: 157 LBS | DIASTOLIC BLOOD PRESSURE: 70 MMHG | RESPIRATION RATE: 16 BRPM | TEMPERATURE: 98.7 F | OXYGEN SATURATION: 98 % | SYSTOLIC BLOOD PRESSURE: 122 MMHG | HEIGHT: 74 IN | HEART RATE: 77 BPM

## 2018-12-14 DIAGNOSIS — Z72.0 TOBACCO USER: ICD-10-CM

## 2018-12-14 DIAGNOSIS — R68.2 DRY MOUTH: ICD-10-CM

## 2018-12-14 DIAGNOSIS — B37.0 THRUSH: ICD-10-CM

## 2018-12-14 PROCEDURE — 99214 OFFICE O/P EST MOD 30 MIN: CPT | Performed by: PHYSICIAN ASSISTANT

## 2018-12-14 ASSESSMENT — ENCOUNTER SYMPTOMS
DIARRHEA: 0
ABDOMINAL PAIN: 0
SHORTNESS OF BREATH: 0
FEVER: 0
COUGH: 1
CONSTIPATION: 0
HOARSE VOICE: 1
SORE THROAT: 1
SINUS PAIN: 0
WHEEZING: 0
SWOLLEN GLANDS: 1
CHILLS: 0
VOMITING: 0
NAUSEA: 0

## 2018-12-14 NOTE — PROGRESS NOTES
Subjective:   Alvaro Metcalf Jr. is a 73 y.o. male who presents for Pharyngitis (x 4 weeks; )        Pharyngitis    This is a new problem. Episode onset: 2 weeks  Associated symptoms include coughing, a hoarse voice and swollen glands. Pertinent negatives include no abdominal pain, congestion, diarrhea, ear pain, shortness of breath or vomiting. He has had no exposure to strep or mono.     Pt states throat pain radiates to jaw. He recently had dental work x 1 week ago. Hx of dry mouth.     No hx of asthma or COPD. No hx of bronchitis or pneumonia. Chronic hx of neck pain requiring 2 surgery.     Review of Systems   Constitutional: Negative for chills, fever and malaise/fatigue.   HENT: Positive for hoarse voice and sore throat. Negative for congestion, ear pain and sinus pain.    Respiratory: Positive for cough. Negative for shortness of breath and wheezing.    Gastrointestinal: Negative for abdominal pain, constipation, diarrhea, nausea and vomiting.   All other systems reviewed and are negative.      PMH:  has a past medical history of Arrhythmia; Arthritic-like pain; Arthritis; CAD (coronary artery disease) (7/20/2012); Cerebral palsy (HCC); Chronic neck pain (7/20/2012); Cold feeling; Dyspnea (7/20/2012); Elevated cholesterol; Fatigue (7/20/2012); Hyperlipidemia (7/20/2012); and Renal disorder.  MEDS:   Current Outpatient Prescriptions:   •  nystatin (MYCOSTATIN) 360251 UNIT/ML Suspension, Take 5 mL by mouth 4 times a day for 10 days., Disp: 200 mL, Rfl: 0  •  pregabalin (LYRICA) 75 MG Cap, Take 1 Cap by mouth 3 times a day., Disp: 90 Cap, Rfl: 6  •  atorvastatin (LIPITOR) 40 MG Tab, Take 1 Tab by mouth every 48 hours., Disp: 45 Tab, Rfl: 4  •  zolpidem (AMBIEN) 10 MG Tab, , Disp: , Rfl:   •  amitriptyline (ELAVIL) 50 MG Tab, One bid., Disp: 180 Tab, Rfl: 3  •  methocarbamol (ROBAXIN) 750 MG TABS, Take 1 Tab by mouth 3 times a day., Disp: 90 Tab, Rfl: 3  •  lorazepam (ATIVAN) 1 MG TABS, Take 1 Tab by  "mouth 2 Times a Day. One bid., Disp: 60 Tab, Rfl: 3  •  aspirin (ASA) 325 MG TABS, Take 325 mg by mouth every day., Disp: , Rfl:   ALLERGIES:   Allergies   Allergen Reactions   • Tetanus Toxoid Anaphylaxis   • Crestor [Rosuvastatin]      Gi upset and myalgias     SURGHX:   Past Surgical History:   Procedure Laterality Date   • PB INJ DX/THER AGNT PARAVERT FACET JOINT, CE* Right 2/25/2016    Procedure: INJ PARA FACET CT/ 1 LVL W/IG - C2, 3, 4, 5;  Surgeon: Handy Reynoso M.D.;  Location: [facility];  Service: Pain Management   • PB INJ DX/THER AGNT PARAVERT FACET JOINT, CE*  2/25/2016    Procedure: INJ PARA FACET C/T 2D LVL W/IG ;  Surgeon: Handy Reynoso M.D.;  Location: [facility];  Service: Pain Management   • PB INJ DX/THER AGNT PARAVERT FACET JOINT, CE*  2/25/2016    Procedure: INJ PARA FACET C/T 3D LVL W/IG;  Surgeon: Handy Reynoso M.D.;  Location: [facility];  Service: Pain Management   • CERVICAL DISK AND FUSION ANTERIOR  5/12/2010    Performed by VASHTI FULLER at [facility]   • HAND SURGERY  1996    left hand   • CERVICAL DISK AND FUSION ANTERIOR  1990   • CATARACT EXTRACTION WITH IOL Bilateral     one eye  10 years ago and the other eye 20 years   • TONSILLECTOMY       SOCHX:  reports that he has been smoking Cigarettes.  He has a 40.50 pack-year smoking history. He has never used smokeless tobacco. He reports that he drinks alcohol. He reports that he does not use drugs.  Family History   Problem Relation Age of Onset   • Heart Attack Father    • Cancer Mother         intestinal cancer        Objective:   /70 (BP Location: Left arm, Patient Position: Sitting, BP Cuff Size: Adult)   Pulse 77   Temp 37.1 °C (98.7 °F) (Temporal)   Resp 16   Ht 1.88 m (6' 2.02\")   Wt 71.2 kg (157 lb)   SpO2 98%   BMI 20.15 kg/m²     Physical Exam   Constitutional: He is oriented to person, place, and time. He appears well-developed and " well-nourished. No distress.   HENT:   Head: Normocephalic and atraumatic.   Mouth/Throat: Posterior oropharyngeal erythema present. No oropharyngeal exudate or posterior oropharyngeal edema.       Eyes: Pupils are equal, round, and reactive to light. Conjunctivae are normal.   Cardiovascular: Normal rate and regular rhythm.    Pulmonary/Chest: Effort normal and breath sounds normal.   Neurological: He is alert and oriented to person, place, and time.   Skin: Skin is warm and dry.   Psychiatric: He has a normal mood and affect. His behavior is normal.   Vitals reviewed.        Assessment/Plan:     1. Thrush  REFERRAL TO ENT    nystatin (MYCOSTATIN) 136580 UNIT/ML Suspension   2. Dry mouth  REFERRAL TO ENT    nystatin (MYCOSTATIN) 350276 UNIT/ML Suspension   3. Tobacco user  REFERRAL TO ENT     Start nystatin, warm salt water gargles and increase fluids.  Smoking cessation discussed.  We discussed importance of follow-up with ENT, referral initiated.    Follow-up with primary care provider within 7-10 days.  If symptoms worsen or persist patient can contact me or return to clinic for follow-up.  Red flags and emergency room precautions discussed.  Patient appears understanding of information.

## 2018-12-14 NOTE — PATIENT INSTRUCTIONS
Oral Thrush, Adult  Oral thrush is an infection in your mouth and throat. It causes white patches on your tongue and in your mouth.  Follow these instructions at home:  Helping with soreness  · To lessen your pain:  ¨ Drink cold liquids, like water and iced tea.  ¨ Eat frozen ice pops or frozen juices.  ¨ Eat foods that are easy to swallow, like gelatin and ice cream.  ¨ Drink from a straw if the patches in your mouth are painful.  General instructions  · Take or use over-the-counter and prescription medicines only as told by your doctor. Medicine for oral thrush may be something to swallow, or it may be something to put on the infected area.  · Eat plain yogurt that has live cultures in it. Read the label to make sure.  · If you wear dentures:  ¨ Take out your dentures before you go to bed.  ¨ Brush them well.  ¨ Soak them in a denture .  · Rinse your mouth with warm salt-water many times a day. To make the salt-water mixture, completely dissolve 1/2-1 teaspoon of salt in 1 cup of warm water.  Contact a doctor if:  · Your problems are getting worse.  · Your problems do not get better in less than 7 days with treatment.  · Your infection is spreading. This may show as white patches on the skin outside of your mouth.  · You are nursing your baby and you have redness and pain in the nipples.  This information is not intended to replace advice given to you by your health care provider. Make sure you discuss any questions you have with your health care provider.  Document Released: 03/14/2011 Document Revised: 09/11/2017 Document Reviewed: 09/11/2017  [employer] Interactive Patient Education © 2017 [employer]

## 2018-12-26 DIAGNOSIS — E78.2 MIXED HYPERLIPIDEMIA: ICD-10-CM

## 2018-12-28 RX ORDER — ATORVASTATIN CALCIUM 40 MG/1
40 TABLET, FILM COATED ORAL
Qty: 45 TAB | Refills: 4 | Status: SHIPPED | OUTPATIENT
Start: 2018-12-28 | End: 2019-01-01 | Stop reason: SDUPTHER

## 2019-01-01 ENCOUNTER — TELEPHONE (OUTPATIENT)
Dept: MEDICAL GROUP | Age: 74
End: 2019-01-01

## 2019-01-01 ENCOUNTER — OFFICE VISIT (OUTPATIENT)
Dept: MEDICAL GROUP | Age: 74
End: 2019-01-01
Payer: MEDICARE

## 2019-01-01 ENCOUNTER — NON-PROVIDER VISIT (OUTPATIENT)
Dept: MEDICAL GROUP | Age: 74
End: 2019-01-01
Payer: MEDICARE

## 2019-01-01 ENCOUNTER — HOSPITAL ENCOUNTER (OUTPATIENT)
Dept: LAB | Facility: MEDICAL CENTER | Age: 74
End: 2019-06-20
Attending: INTERNAL MEDICINE
Payer: MEDICARE

## 2019-01-01 ENCOUNTER — HOSPITAL ENCOUNTER (OUTPATIENT)
Dept: RADIOLOGY | Facility: MEDICAL CENTER | Age: 74
End: 2019-10-14
Attending: INTERNAL MEDICINE
Payer: MEDICARE

## 2019-01-01 ENCOUNTER — HOSPITAL ENCOUNTER (OUTPATIENT)
Dept: LAB | Facility: MEDICAL CENTER | Age: 74
End: 2019-08-21
Attending: INTERNAL MEDICINE
Payer: MEDICARE

## 2019-01-01 ENCOUNTER — PATIENT OUTREACH (OUTPATIENT)
Dept: HEALTH INFORMATION MANAGEMENT | Facility: OTHER | Age: 74
End: 2019-01-01

## 2019-01-01 ENCOUNTER — OFFICE VISIT (OUTPATIENT)
Dept: NEUROLOGY | Facility: MEDICAL CENTER | Age: 74
End: 2019-01-01
Payer: MEDICARE

## 2019-01-01 ENCOUNTER — HOSPITAL ENCOUNTER (OUTPATIENT)
Dept: RADIOLOGY | Facility: MEDICAL CENTER | Age: 74
End: 2019-05-06
Attending: INTERNAL MEDICINE
Payer: MEDICARE

## 2019-01-01 ENCOUNTER — NON-PROVIDER VISIT (OUTPATIENT)
Dept: NEUROLOGY | Facility: MEDICAL CENTER | Age: 74
End: 2019-01-01
Payer: MEDICARE

## 2019-01-01 ENCOUNTER — HOSPITAL ENCOUNTER (OUTPATIENT)
Dept: CARDIOLOGY | Facility: MEDICAL CENTER | Age: 74
End: 2019-10-14
Attending: INTERNAL MEDICINE
Payer: MEDICARE

## 2019-01-01 ENCOUNTER — HOSPITAL ENCOUNTER (OUTPATIENT)
Dept: LAB | Facility: MEDICAL CENTER | Age: 74
End: 2019-04-15
Attending: INTERNAL MEDICINE
Payer: MEDICARE

## 2019-01-01 ENCOUNTER — APPOINTMENT (OUTPATIENT)
Dept: MEDICAL GROUP | Age: 74
End: 2019-01-01
Payer: MEDICARE

## 2019-01-01 VITALS
OXYGEN SATURATION: 98 % | SYSTOLIC BLOOD PRESSURE: 120 MMHG | WEIGHT: 147.4 LBS | HEIGHT: 74 IN | TEMPERATURE: 97.8 F | BODY MASS INDEX: 18.92 KG/M2 | DIASTOLIC BLOOD PRESSURE: 74 MMHG | RESPIRATION RATE: 16 BRPM | HEART RATE: 86 BPM

## 2019-01-01 VITALS
TEMPERATURE: 96.3 F | HEIGHT: 74 IN | DIASTOLIC BLOOD PRESSURE: 58 MMHG | OXYGEN SATURATION: 96 % | WEIGHT: 146.8 LBS | HEART RATE: 82 BPM | BODY MASS INDEX: 18.84 KG/M2 | SYSTOLIC BLOOD PRESSURE: 138 MMHG | RESPIRATION RATE: 18 BRPM

## 2019-01-01 VITALS
TEMPERATURE: 97.6 F | DIASTOLIC BLOOD PRESSURE: 68 MMHG | HEART RATE: 78 BPM | HEIGHT: 74 IN | WEIGHT: 147 LBS | SYSTOLIC BLOOD PRESSURE: 104 MMHG | BODY MASS INDEX: 18.87 KG/M2 | OXYGEN SATURATION: 95 %

## 2019-01-01 VITALS
BODY MASS INDEX: 18.87 KG/M2 | HEIGHT: 74 IN | TEMPERATURE: 97.9 F | WEIGHT: 147 LBS | DIASTOLIC BLOOD PRESSURE: 72 MMHG | SYSTOLIC BLOOD PRESSURE: 110 MMHG | OXYGEN SATURATION: 96 % | HEART RATE: 78 BPM

## 2019-01-01 VITALS
HEIGHT: 74 IN | SYSTOLIC BLOOD PRESSURE: 114 MMHG | DIASTOLIC BLOOD PRESSURE: 70 MMHG | WEIGHT: 148.2 LBS | OXYGEN SATURATION: 94 % | HEART RATE: 72 BPM | TEMPERATURE: 98.9 F | BODY MASS INDEX: 19.02 KG/M2

## 2019-01-01 VITALS
BODY MASS INDEX: 20.15 KG/M2 | HEIGHT: 74 IN | TEMPERATURE: 98.1 F | WEIGHT: 157 LBS | OXYGEN SATURATION: 92 % | HEART RATE: 78 BPM | DIASTOLIC BLOOD PRESSURE: 60 MMHG | SYSTOLIC BLOOD PRESSURE: 108 MMHG

## 2019-01-01 VITALS
DIASTOLIC BLOOD PRESSURE: 68 MMHG | BODY MASS INDEX: 18.71 KG/M2 | TEMPERATURE: 97.6 F | OXYGEN SATURATION: 92 % | SYSTOLIC BLOOD PRESSURE: 106 MMHG | HEIGHT: 74 IN | HEART RATE: 75 BPM | WEIGHT: 145.8 LBS

## 2019-01-01 DIAGNOSIS — Z00.00 MEDICARE ANNUAL WELLNESS VISIT, SUBSEQUENT: ICD-10-CM

## 2019-01-01 DIAGNOSIS — G62.9 PERIPHERAL POLYNEUROPATHY: ICD-10-CM

## 2019-01-01 DIAGNOSIS — R20.2 NUMBNESS AND TINGLING OF LEFT LEG: ICD-10-CM

## 2019-01-01 DIAGNOSIS — M54.2 CHRONIC NECK PAIN: ICD-10-CM

## 2019-01-01 DIAGNOSIS — F17.210 SMOKING GREATER THAN 30 PACK YEARS: ICD-10-CM

## 2019-01-01 DIAGNOSIS — F41.9 ANXIETY: ICD-10-CM

## 2019-01-01 DIAGNOSIS — Z13.31 SCREENING FOR DEPRESSION: ICD-10-CM

## 2019-01-01 DIAGNOSIS — T73.3XXA FATIGUE DUE TO EXCESSIVE EXERTION, INITIAL ENCOUNTER: ICD-10-CM

## 2019-01-01 DIAGNOSIS — R07.9 CHEST PAIN, EXERTIONAL: ICD-10-CM

## 2019-01-01 DIAGNOSIS — R20.0 NUMBNESS AND TINGLING OF LEFT LEG: ICD-10-CM

## 2019-01-01 DIAGNOSIS — G89.29 CHRONIC NECK PAIN: ICD-10-CM

## 2019-01-01 DIAGNOSIS — E78.2 MIXED HYPERLIPIDEMIA: ICD-10-CM

## 2019-01-01 DIAGNOSIS — Z72.0 DECLINED SMOKING CESSATION: ICD-10-CM

## 2019-01-01 DIAGNOSIS — F17.200 TOBACCO DEPENDENCE: ICD-10-CM

## 2019-01-01 DIAGNOSIS — F51.01 PRIMARY INSOMNIA: ICD-10-CM

## 2019-01-01 DIAGNOSIS — G80.8 OTHER CEREBRAL PALSY (HCC): ICD-10-CM

## 2019-01-01 DIAGNOSIS — R73.01 IFG (IMPAIRED FASTING GLUCOSE): ICD-10-CM

## 2019-01-01 DIAGNOSIS — E23.6 EMPTY SELLA SYNDROME (HCC): ICD-10-CM

## 2019-01-01 DIAGNOSIS — R20.8 BURNING SENSATION: ICD-10-CM

## 2019-01-01 DIAGNOSIS — Z91.81 AT HIGH RISK FOR FALLS: ICD-10-CM

## 2019-01-01 DIAGNOSIS — M15.9 PRIMARY OSTEOARTHRITIS INVOLVING MULTIPLE JOINTS: ICD-10-CM

## 2019-01-01 DIAGNOSIS — M48.02 CERVICAL STENOSIS OF SPINE: ICD-10-CM

## 2019-01-01 DIAGNOSIS — G80.2 SPASTIC HEMIPLEGIC CEREBRAL PALSY (HCC): ICD-10-CM

## 2019-01-01 DIAGNOSIS — G62.9 PERIPHERAL NEURITIS: ICD-10-CM

## 2019-01-01 DIAGNOSIS — F17.200 CURRENT SMOKER: ICD-10-CM

## 2019-01-01 DIAGNOSIS — R73.09 ALTERED GLUCOSE METABOLISM: ICD-10-CM

## 2019-01-01 DIAGNOSIS — R53.83 OTHER FATIGUE: ICD-10-CM

## 2019-01-01 DIAGNOSIS — Z23 NEED FOR VACCINATION: ICD-10-CM

## 2019-01-01 DIAGNOSIS — R07.9 EXERTIONAL CHEST PAIN: ICD-10-CM

## 2019-01-01 DIAGNOSIS — F17.290 CIGAR SMOKER: ICD-10-CM

## 2019-01-01 DIAGNOSIS — B07.9 WART OF SCALP: ICD-10-CM

## 2019-01-01 DIAGNOSIS — R26.89 LOSS OF BALANCE: ICD-10-CM

## 2019-01-01 DIAGNOSIS — E55.9 VITAMIN D DEFICIENCY: ICD-10-CM

## 2019-01-01 LAB
ALBUMIN SERPL BCP-MCNC: 4.2 G/DL (ref 3.2–4.9)
ALBUMIN SERPL BCP-MCNC: 4.2 G/DL (ref 3.2–4.9)
ALBUMIN SERPL BCP-MCNC: 4.3 G/DL (ref 3.2–4.9)
ALBUMIN/GLOB SERPL: 1.8 G/DL
ALBUMIN/GLOB SERPL: 1.9 G/DL
ALBUMIN/GLOB SERPL: 1.9 G/DL
ALP SERPL-CCNC: 53 U/L (ref 30–99)
ALP SERPL-CCNC: 65 U/L (ref 30–99)
ALP SERPL-CCNC: 68 U/L (ref 30–99)
ALT SERPL-CCNC: 14 U/L (ref 2–50)
ALT SERPL-CCNC: 22 U/L (ref 2–50)
ALT SERPL-CCNC: 27 U/L (ref 2–50)
ANION GAP SERPL CALC-SCNC: 11 MMOL/L (ref 0–11.9)
ANION GAP SERPL CALC-SCNC: 5 MMOL/L (ref 0–11.9)
ANION GAP SERPL CALC-SCNC: 6 MMOL/L (ref 0–11.9)
AST SERPL-CCNC: 21 U/L (ref 12–45)
AST SERPL-CCNC: 24 U/L (ref 12–45)
AST SERPL-CCNC: 25 U/L (ref 12–45)
BASOPHILS # BLD AUTO: 0.8 % (ref 0–1.8)
BASOPHILS # BLD AUTO: 1.2 % (ref 0–1.8)
BASOPHILS # BLD: 0.06 K/UL (ref 0–0.12)
BASOPHILS # BLD: 0.09 K/UL (ref 0–0.12)
BILIRUB SERPL-MCNC: 0.7 MG/DL (ref 0.1–1.5)
BUN SERPL-MCNC: 20 MG/DL (ref 8–22)
BUN SERPL-MCNC: 28 MG/DL (ref 8–22)
BUN SERPL-MCNC: 30 MG/DL (ref 8–22)
CALCIUM SERPL-MCNC: 8.8 MG/DL (ref 8.5–10.5)
CALCIUM SERPL-MCNC: 9.2 MG/DL (ref 8.5–10.5)
CALCIUM SERPL-MCNC: 9.2 MG/DL (ref 8.5–10.5)
CHLORIDE SERPL-SCNC: 106 MMOL/L (ref 96–112)
CHLORIDE SERPL-SCNC: 106 MMOL/L (ref 96–112)
CHLORIDE SERPL-SCNC: 107 MMOL/L (ref 96–112)
CHOLEST SERPL-MCNC: 115 MG/DL (ref 100–199)
CHOLEST SERPL-MCNC: 124 MG/DL (ref 100–199)
CHOLEST SERPL-MCNC: 140 MG/DL (ref 100–199)
CO2 SERPL-SCNC: 26 MMOL/L (ref 20–33)
CO2 SERPL-SCNC: 28 MMOL/L (ref 20–33)
CO2 SERPL-SCNC: 30 MMOL/L (ref 20–33)
CREAT SERPL-MCNC: 0.82 MG/DL (ref 0.5–1.4)
CREAT SERPL-MCNC: 0.92 MG/DL (ref 0.5–1.4)
CREAT SERPL-MCNC: 0.95 MG/DL (ref 0.5–1.4)
CREAT UR-MCNC: 47.3 MG/DL
EOSINOPHIL # BLD AUTO: 0.25 K/UL (ref 0–0.51)
EOSINOPHIL # BLD AUTO: 0.27 K/UL (ref 0–0.51)
EOSINOPHIL NFR BLD: 3.5 % (ref 0–6.9)
EOSINOPHIL NFR BLD: 3.6 % (ref 0–6.9)
ERYTHROCYTE [DISTWIDTH] IN BLOOD BY AUTOMATED COUNT: 43 FL (ref 35.9–50)
ERYTHROCYTE [DISTWIDTH] IN BLOOD BY AUTOMATED COUNT: 45.4 FL (ref 35.9–50)
EST. AVERAGE GLUCOSE BLD GHB EST-MCNC: 131 MG/DL
EST. AVERAGE GLUCOSE BLD GHB EST-MCNC: 140 MG/DL
FASTING STATUS PATIENT QL REPORTED: NORMAL
GLOBULIN SER CALC-MCNC: 2.2 G/DL (ref 1.9–3.5)
GLOBULIN SER CALC-MCNC: 2.3 G/DL (ref 1.9–3.5)
GLOBULIN SER CALC-MCNC: 2.3 G/DL (ref 1.9–3.5)
GLUCOSE SERPL-MCNC: 104 MG/DL (ref 65–99)
GLUCOSE SERPL-MCNC: 145 MG/DL (ref 65–99)
GLUCOSE SERPL-MCNC: 97 MG/DL (ref 65–99)
HBA1C MFR BLD: 6.2 % (ref 0–5.6)
HBA1C MFR BLD: 6.5 % (ref 0–5.6)
HCT VFR BLD AUTO: 47.7 % (ref 42–52)
HCT VFR BLD AUTO: 47.8 % (ref 42–52)
HDLC SERPL-MCNC: 36 MG/DL
HDLC SERPL-MCNC: 41 MG/DL
HDLC SERPL-MCNC: 47 MG/DL
HGB BLD-MCNC: 15.1 G/DL (ref 14–18)
HGB BLD-MCNC: 15.8 G/DL (ref 14–18)
IMM GRANULOCYTES # BLD AUTO: 0.04 K/UL (ref 0–0.11)
IMM GRANULOCYTES # BLD AUTO: 0.04 K/UL (ref 0–0.11)
IMM GRANULOCYTES NFR BLD AUTO: 0.5 % (ref 0–0.9)
IMM GRANULOCYTES NFR BLD AUTO: 0.6 % (ref 0–0.9)
LDLC SERPL CALC-MCNC: 46 MG/DL
LDLC SERPL CALC-MCNC: 61 MG/DL
LDLC SERPL CALC-MCNC: 62 MG/DL
LV EJECT FRACT  99904: 60
LV EJECT FRACT MOD 2C 99903: 61.35
LV EJECT FRACT MOD 4C 99902: 61.69
LV EJECT FRACT MOD BP 99901: 62.22
LYMPHOCYTES # BLD AUTO: 1.78 K/UL (ref 1–4.8)
LYMPHOCYTES # BLD AUTO: 1.96 K/UL (ref 1–4.8)
LYMPHOCYTES NFR BLD: 24.1 % (ref 22–41)
LYMPHOCYTES NFR BLD: 27.4 % (ref 22–41)
MCH RBC QN AUTO: 29.4 PG (ref 27–33)
MCH RBC QN AUTO: 30.3 PG (ref 27–33)
MCHC RBC AUTO-ENTMCNC: 31.6 G/DL (ref 33.7–35.3)
MCHC RBC AUTO-ENTMCNC: 33.1 G/DL (ref 33.7–35.3)
MCV RBC AUTO: 91.6 FL (ref 81.4–97.8)
MCV RBC AUTO: 93.2 FL (ref 81.4–97.8)
MICROALBUMIN UR-MCNC: 1.6 MG/DL
MICROALBUMIN/CREAT UR: 34 MG/G (ref 0–30)
MONOCYTES # BLD AUTO: 0.57 K/UL (ref 0–0.85)
MONOCYTES # BLD AUTO: 0.6 K/UL (ref 0–0.85)
MONOCYTES NFR BLD AUTO: 8 % (ref 0–13.4)
MONOCYTES NFR BLD AUTO: 8.1 % (ref 0–13.4)
NEUTROPHILS # BLD AUTO: 4.27 K/UL (ref 1.82–7.42)
NEUTROPHILS # BLD AUTO: 4.62 K/UL (ref 1.82–7.42)
NEUTROPHILS NFR BLD: 59.7 % (ref 44–72)
NEUTROPHILS NFR BLD: 62.5 % (ref 44–72)
NRBC # BLD AUTO: 0 K/UL
NRBC # BLD AUTO: 0 K/UL
NRBC BLD-RTO: 0 /100 WBC
NRBC BLD-RTO: 0 /100 WBC
PLATELET # BLD AUTO: 148 K/UL (ref 164–446)
PLATELET # BLD AUTO: 154 K/UL (ref 164–446)
PMV BLD AUTO: 10.4 FL (ref 9–12.9)
PMV BLD AUTO: 10.9 FL (ref 9–12.9)
POTASSIUM SERPL-SCNC: 3.6 MMOL/L (ref 3.6–5.5)
POTASSIUM SERPL-SCNC: 3.7 MMOL/L (ref 3.6–5.5)
POTASSIUM SERPL-SCNC: 3.9 MMOL/L (ref 3.6–5.5)
PROT SERPL-MCNC: 6.4 G/DL (ref 6–8.2)
PROT SERPL-MCNC: 6.5 G/DL (ref 6–8.2)
PROT SERPL-MCNC: 6.6 G/DL (ref 6–8.2)
RBC # BLD AUTO: 5.13 M/UL (ref 4.7–6.1)
RBC # BLD AUTO: 5.21 M/UL (ref 4.7–6.1)
SODIUM SERPL-SCNC: 141 MMOL/L (ref 135–145)
SODIUM SERPL-SCNC: 141 MMOL/L (ref 135–145)
SODIUM SERPL-SCNC: 143 MMOL/L (ref 135–145)
TRIGL SERPL-MCNC: 105 MG/DL (ref 0–149)
TRIGL SERPL-MCNC: 112 MG/DL (ref 0–149)
TRIGL SERPL-MCNC: 214 MG/DL (ref 0–149)
TSH SERPL DL<=0.005 MIU/L-ACNC: 2.25 UIU/ML (ref 0.38–5.33)
TSH SERPL DL<=0.005 MIU/L-ACNC: 2.5 UIU/ML (ref 0.38–5.33)
WBC # BLD AUTO: 7.2 K/UL (ref 4.8–10.8)
WBC # BLD AUTO: 7.4 K/UL (ref 4.8–10.8)

## 2019-01-01 PROCEDURE — 80061 LIPID PANEL: CPT

## 2019-01-01 PROCEDURE — 93922 UPR/L XTREMITY ART 2 LEVELS: CPT | Mod: 26 | Performed by: SURGERY

## 2019-01-01 PROCEDURE — 99214 OFFICE O/P EST MOD 30 MIN: CPT | Performed by: INTERNAL MEDICINE

## 2019-01-01 PROCEDURE — A9502 TC99M TETROFOSMIN: HCPCS

## 2019-01-01 PROCEDURE — 90662 IIV NO PRSV INCREASED AG IM: CPT | Performed by: INTERNAL MEDICINE

## 2019-01-01 PROCEDURE — 36415 COLL VENOUS BLD VENIPUNCTURE: CPT

## 2019-01-01 PROCEDURE — 80053 COMPREHEN METABOLIC PANEL: CPT

## 2019-01-01 PROCEDURE — 99215 OFFICE O/P EST HI 40 MIN: CPT | Performed by: NURSE PRACTITIONER

## 2019-01-01 PROCEDURE — 82570 ASSAY OF URINE CREATININE: CPT

## 2019-01-01 PROCEDURE — 700111 HCHG RX REV CODE 636 W/ 250 OVERRIDE (IP)

## 2019-01-01 PROCEDURE — 84443 ASSAY THYROID STIM HORMONE: CPT

## 2019-01-01 PROCEDURE — G0439 PPPS, SUBSEQ VISIT: HCPCS | Performed by: INTERNAL MEDICINE

## 2019-01-01 PROCEDURE — 85025 COMPLETE CBC W/AUTO DIFF WBC: CPT

## 2019-01-01 PROCEDURE — 95886 MUSC TEST DONE W/N TEST COMP: CPT | Performed by: SPECIALIST

## 2019-01-01 PROCEDURE — 83036 HEMOGLOBIN GLYCOSYLATED A1C: CPT

## 2019-01-01 PROCEDURE — 99205 OFFICE O/P NEW HI 60 MIN: CPT | Performed by: NURSE PRACTITIONER

## 2019-01-01 PROCEDURE — 93306 TTE W/DOPPLER COMPLETE: CPT | Mod: 26 | Performed by: INTERNAL MEDICINE

## 2019-01-01 PROCEDURE — 82043 UR ALBUMIN QUANTITATIVE: CPT

## 2019-01-01 PROCEDURE — 93922 UPR/L XTREMITY ART 2 LEVELS: CPT

## 2019-01-01 PROCEDURE — G0008 ADMIN INFLUENZA VIRUS VAC: HCPCS | Performed by: INTERNAL MEDICINE

## 2019-01-01 PROCEDURE — 95908 NRV CNDJ TST 3-4 STUDIES: CPT | Performed by: SPECIALIST

## 2019-01-01 PROCEDURE — 8041 PR SCP AHA: Performed by: INTERNAL MEDICINE

## 2019-01-01 PROCEDURE — 93306 TTE W/DOPPLER COMPLETE: CPT

## 2019-01-01 RX ORDER — MELOXICAM 15 MG/1
15 TABLET ORAL DAILY
COMMUNITY
End: 2020-01-01

## 2019-01-01 RX ORDER — M-VIT,TX,IRON,MINS/CALC/FOLIC 27MG-0.4MG
1 TABLET ORAL DAILY
COMMUNITY
End: 2019-01-01

## 2019-01-01 RX ORDER — LYSINE HCL 500 MG
500 TABLET ORAL DAILY
COMMUNITY
End: 2020-01-01

## 2019-01-01 RX ORDER — REGADENOSON 0.08 MG/ML
INJECTION, SOLUTION INTRAVENOUS
Status: COMPLETED
Start: 2019-01-01 | End: 2019-01-01

## 2019-01-01 RX ORDER — PREGABALIN 75 MG/1
75 CAPSULE ORAL 3 TIMES DAILY
Qty: 90 CAP | Refills: 11 | Status: SHIPPED | OUTPATIENT
Start: 2019-01-01 | End: 2020-01-01

## 2019-01-01 RX ORDER — ATORVASTATIN CALCIUM 40 MG/1
40 TABLET, FILM COATED ORAL
Qty: 50 TAB | Refills: 0 | Status: SHIPPED | OUTPATIENT
Start: 2019-01-01 | End: 2019-01-01 | Stop reason: SDUPTHER

## 2019-01-01 RX ORDER — AMITRIPTYLINE HYDROCHLORIDE 50 MG/1
50 TABLET, FILM COATED ORAL
Qty: 90 TAB | Refills: 3 | Status: SHIPPED
Start: 2019-01-01 | End: 2020-01-01

## 2019-01-01 RX ORDER — ATORVASTATIN CALCIUM 40 MG/1
40 TABLET, FILM COATED ORAL
Qty: 90 TAB | Refills: 4 | Status: SHIPPED
Start: 2019-01-01 | End: 2020-01-01

## 2019-01-01 RX ADMIN — REGADENOSON 0.4 MG: 0.08 INJECTION, SOLUTION INTRAVENOUS at 14:54

## 2019-01-01 ASSESSMENT — PAIN SCALES - GENERAL: PAINLEVEL: 4=SLIGHT-MODERATE PAIN

## 2019-01-01 ASSESSMENT — ENCOUNTER SYMPTOMS
CARDIOVASCULAR NEGATIVE: 1
PSYCHIATRIC NEGATIVE: 1
EYES NEGATIVE: 1
MUSCULOSKELETAL NEGATIVE: 1
CONSTITUTIONAL NEGATIVE: 1
GASTROINTESTINAL NEGATIVE: 1
GASTROINTESTINAL NEGATIVE: 1
GENERAL WELL-BEING: FAIR
CONSTITUTIONAL NEGATIVE: 1
MUSCULOSKELETAL NEGATIVE: 1
EYES NEGATIVE: 1
GASTROINTESTINAL NEGATIVE: 1
RESPIRATORY NEGATIVE: 1
SENSORY CHANGE: 1
CARDIOVASCULAR NEGATIVE: 1
CARDIOVASCULAR NEGATIVE: 1
CONSTITUTIONAL NEGATIVE: 1
RESPIRATORY NEGATIVE: 1
PSYCHIATRIC NEGATIVE: 1
NEUROLOGICAL NEGATIVE: 1

## 2019-01-01 ASSESSMENT — PATIENT HEALTH QUESTIONNAIRE - PHQ9
CLINICAL INTERPRETATION OF PHQ2 SCORE: 0
CLINICAL INTERPRETATION OF PHQ2 SCORE: 0

## 2019-01-01 ASSESSMENT — ACTIVITIES OF DAILY LIVING (ADL): BATHING_REQUIRES_ASSISTANCE: 0

## 2019-01-07 ENCOUNTER — HOSPITAL ENCOUNTER (OUTPATIENT)
Dept: LAB | Facility: MEDICAL CENTER | Age: 74
End: 2019-01-07
Attending: OTOLARYNGOLOGY
Payer: MEDICARE

## 2019-01-07 LAB
CRP SERPL HS-MCNC: 0.09 MG/DL (ref 0–0.75)
ERYTHROCYTE [SEDIMENTATION RATE] IN BLOOD BY WESTERGREN METHOD: 7 MM/HOUR (ref 0–20)

## 2019-01-07 PROCEDURE — 86235 NUCLEAR ANTIGEN ANTIBODY: CPT

## 2019-01-07 PROCEDURE — 86431 RHEUMATOID FACTOR QUANT: CPT

## 2019-01-07 PROCEDURE — 36415 COLL VENOUS BLD VENIPUNCTURE: CPT

## 2019-01-07 PROCEDURE — 85652 RBC SED RATE AUTOMATED: CPT

## 2019-01-07 PROCEDURE — 86140 C-REACTIVE PROTEIN: CPT

## 2019-01-08 LAB — RHEUMATOID FACT SER IA-ACNC: 11 IU/ML (ref 0–14)

## 2019-01-09 LAB
ENA SS-B IGG SER IA-ACNC: 0 AU/ML (ref 0–40)
SSA52 R0ENA AB IGG Q0420: 1 AU/ML (ref 0–40)
SSA60 R0ENA AB IGG Q0419: 0 AU/ML (ref 0–40)

## 2019-01-28 ENCOUNTER — HOSPITAL ENCOUNTER (OUTPATIENT)
Dept: RADIOLOGY | Facility: MEDICAL CENTER | Age: 74
End: 2019-01-28
Attending: INTERNAL MEDICINE
Payer: MEDICARE

## 2019-01-28 DIAGNOSIS — F17.210 SMOKING GREATER THAN 30 PACK YEARS: ICD-10-CM

## 2019-01-28 DIAGNOSIS — Z12.2 ENCOUNTER FOR SCREENING FOR LUNG CANCER: ICD-10-CM

## 2019-01-28 PROCEDURE — G0297 LDCT FOR LUNG CA SCREEN: HCPCS

## 2019-02-15 ENCOUNTER — HOSPITAL ENCOUNTER (EMERGENCY)
Facility: MEDICAL CENTER | Age: 74
End: 2019-02-15
Attending: EMERGENCY MEDICINE
Payer: MEDICARE

## 2019-02-15 VITALS
WEIGHT: 155.2 LBS | RESPIRATION RATE: 16 BRPM | DIASTOLIC BLOOD PRESSURE: 84 MMHG | TEMPERATURE: 96.8 F | OXYGEN SATURATION: 97 % | SYSTOLIC BLOOD PRESSURE: 144 MMHG | BODY MASS INDEX: 19.92 KG/M2 | HEIGHT: 74 IN | HEART RATE: 85 BPM

## 2019-02-15 DIAGNOSIS — R25.2 SPASM: ICD-10-CM

## 2019-02-15 PROCEDURE — 99282 EMERGENCY DEPT VISIT SF MDM: CPT

## 2019-02-15 ASSESSMENT — ENCOUNTER SYMPTOMS
BACK PAIN: 0
ABDOMINAL PAIN: 0

## 2019-02-16 ASSESSMENT — ENCOUNTER SYMPTOMS
SENSORY CHANGE: 0
PALPITATIONS: 0
CHILLS: 0
FOCAL WEAKNESS: 0
TINGLING: 1
PND: 0
FEVER: 0
CLAUDICATION: 0

## 2019-02-16 NOTE — ED TRIAGE NOTES
"Alvaro Metcalf Jr.    Chief Complaint   Patient presents with   • Knee Pain     Left knee       Pt ambulatory to triage with above complaint.  Pt states he feels a pulsating behind the L knee.  Pt also reports numbness below the knee.  Denies history of blood clots, denies swelling of LLE, no redness or warmth noted.   VSS.    Pt returned to lobby, educated on triage process, and to inform staff of any changes or concerns.    Blood Pressure : 144/84, Pulse: 85, Respiration: 16, Temperature: 36 °C (96.8 °F), Height: 188 cm (6' 2\"), Weight: 70.4 kg (155 lb 3.3 oz), Pulse Oximetry: 97 %, O2 Delivery: None (Room Air)    "

## 2019-02-16 NOTE — DISCHARGE INSTRUCTIONS
Come back to the ER if you have increasing leg pain, changes in color to your leg, unable to move ankle/toes, and if severe swelling.

## 2019-02-16 NOTE — ED PROVIDER NOTES
"ED Provider Note    Scribed for Dexter Bañuelos II, MD by Gutierrez Douglass. 2/15/2019  11:04 PM    Means of Arrival: Walk-in  History obtained by: Patient  Limitations: None    CHIEF COMPLAINT  Chief Complaint   Patient presents with   • Knee Pain     Left knee       HPI  Alvaro Metcalf Jr. is a 73 y.o. male who presents to the Emergency Department with complaints of left knee pain. Per patient, a few hours ago he was experiencing a \"pulsating\" sensation behind his left knee. He explains that this sensation lasted for the duration of 1.5 hours and he also has been experiencing associated numbness in his left foot. He notes that this pulsating sensation is not painful and he has not experienced anything similar to this in the past. He explains that he was also able to apply pressure under his left thigh and this completely stopped the sensation underneath his left knee. 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 He denies any abdominal pain or back pain. He admits that he has been eating and drinking appropriately.     REVIEW OF SYSTEMS  Review of Systems   Constitutional: Negative for chills and fever.   Cardiovascular: Negative for chest pain, palpitations, claudication, leg swelling and PND.   Gastrointestinal: Negative for abdominal pain.   Musculoskeletal: Negative for back pain.        Positive for left knee pain   Neurological: Positive for tingling (at left foot). Negative for sensory change and focal weakness.        Positive for numbness of left foot     See HPI for further details.    PAST MEDICAL HISTORY   has a past medical history of Arrhythmia; Arthritic-like pain; Arthritis; CAD (coronary artery disease) (7/20/2012); Cerebral palsy (HCC); Chronic neck pain (7/20/2012); Cold feeling; Dyspnea (7/20/2012); Elevated cholesterol; Fatigue (7/20/2012); Hyperlipidemia (7/20/2012); and Renal " "disorder.    SOCIAL HISTORY  Social History     Social History Main Topics   • Smoking status: Current Every Day Smoker     Packs/day: 0.75     Years: 54.00     Types: Cigarettes   • Smokeless tobacco: Never Used      Comment: 1/2 pk a day for 45 yrs   • Alcohol use 0.0 oz/week      Comment: Rarely   • Drug use: No   • Sexual activity: No       SURGICAL HISTORY   has a past surgical history that includes tonsillectomy; cervical disk and fusion anterior (1990); hand surgery (1996); cervical disk and fusion anterior (5/12/2010); inj dx/ther agnt paravert facet joint, ce* (Right, 2/25/2016); inj dx/ther agnt paravert facet joint, ce* (2/25/2016); inj dx/ther agnt paravert facet joint, ce* (2/25/2016); and cataract extraction with iol (Bilateral).    CURRENT MEDICATIONS  Home Medications     Reviewed by Bev Bond R.N. (Registered Nurse) on 02/15/19 at 2202  Med List Status: <None>   Medication Last Dose Status   amitriptyline (ELAVIL) 50 MG Tab  Active   aspirin (ASA) 325 MG TABS  Active   atorvastatin (LIPITOR) 40 MG Tab  Active   lorazepam (ATIVAN) 1 MG TABS  Active   methocarbamol (ROBAXIN) 750 MG TABS  Active   pregabalin (LYRICA) 75 MG Cap  Active   zolpidem (AMBIEN) 10 MG Tab  Active                ALLERGIES  Allergies   Allergen Reactions   • Tetanus Toxoid Anaphylaxis   • Crestor [Rosuvastatin]      Gi upset and myalgias       PHYSICAL EXAM  VITAL SIGNS: /84   Pulse 85   Temp 36 °C (96.8 °F) (Temporal)   Resp 16   Ht 1.88 m (6' 2\")   Wt 70.4 kg (155 lb 3.3 oz)   SpO2 97%   BMI 19.93 kg/m²     Pulse ox interpretation: I interpret this pulse ox as normal.  Constitutional: Well-nourished 73 year old male. Alert in no apparent distress.  HENT: No signs of trauma, Bilateral external ears normal, Nose normal.   Eyes: Pupils are equal, Conjunctiva normal, Non-icteric.   Neck: Normal range of motion, No tenderness, Supple, No stridor.   Cardiovascular: Regular rate and rhythm, no murmurs. Symmetric " distal pulses. No cyanosis of extremities. No peripheral edema of extremities. Left sided popliteal pulse equal to right side. 2+ dorsalis pedis. 2+ posterior tibialis pulses all symmetric.   Thorax & Lungs: Normal breath sounds, No respiratory distress, No wheezing, No chest tenderness.   Abdomen: Bowel sounds normal, Soft, No tenderness, No masses, No pulsatile masses. No peritoneal signs.  Skin: Warm, Dry, No erythema, No rash.   Back: No midline bony tenderness, No CVA tenderness.   Musculoskeletal: Good range of motion in all major joints. No tenderness to palpation or major deformities noted.  Soft compartments without palpable masses. Left leg is chronically larger than the right.  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  Neurologic: Alert , Normal motor function, Normal sensory function, No focal deficits noted. Sensation intact distally.  Psychiatric: Affect normal, Judgment normal, Mood normal.       COURSE & MEDICAL DECISION MAKING  Pertinent Labs & Imaging studies reviewed. (See chart for details)    11:04 PM This is a 73 y.o. male who presents with pulsating sensation behind left knee and the differential diagnosis includes but is not limited to unclear etiology of symptoms but physical exam does not suggest vascular disease, neurological deficits, or thrombosis. Possible electrolyte abnormalities, however, he declined blood work and stated that he will come back if his symptoms worsen. Explained to the patient that he can be discharged home at this time but he should return with worsening pain, discoloration of his left foot, or other concerning symptoms. He understands and verbalizes agreement with the plan of care.     The patient is referred to a primary physician for blood pressure management, diabetic screening, and for all other preventative health concerns.    DISPOSITION:  Patient will be discharged home in stable condition.    FOLLOW UP:  Chris Hinkle M.D.  [address]  [place] [address]  [phone]    Call   As needed for follow up as needed    [facility], Emergency Dept  [address] [facility] [address] [phone]    see indications below for reasons to return      FINAL IMPRESSION  1. Spasm          Gutierrez AGUILAR (Scribe), am scribing for, and in the presence of, NELLY Dey II.    Electronically signed by: Gutierrez Douglass (Scribe), 2/15/2019    Dexter AGUILAR II, M* personally performed the services described in this documentation, as scribed by Gutierrez Douglass in my presence, and it is both accurate and complete. E.     The note accurately reflects work and decisions made by me.  Dexter Bañuelos II  2/16/2019  1:25 AM

## 2019-03-01 ENCOUNTER — OFFICE VISIT (OUTPATIENT)
Dept: URGENT CARE | Facility: CLINIC | Age: 74
End: 2019-03-01
Payer: MEDICARE

## 2019-03-01 VITALS
WEIGHT: 160 LBS | HEIGHT: 74 IN | SYSTOLIC BLOOD PRESSURE: 136 MMHG | OXYGEN SATURATION: 96 % | HEART RATE: 76 BPM | TEMPERATURE: 98.9 F | RESPIRATION RATE: 16 BRPM | BODY MASS INDEX: 20.53 KG/M2 | DIASTOLIC BLOOD PRESSURE: 90 MMHG

## 2019-03-01 DIAGNOSIS — L73.1 INGROWN HAIR: ICD-10-CM

## 2019-03-01 PROCEDURE — 99213 OFFICE O/P EST LOW 20 MIN: CPT | Performed by: PHYSICIAN ASSISTANT

## 2019-03-01 ASSESSMENT — ENCOUNTER SYMPTOMS
VOMITING: 0
CHILLS: 0
NAUSEA: 0
FEVER: 0

## 2019-03-02 NOTE — PROGRESS NOTES
Subjective:   Alvaro Metcalf Jr. is a 73 y.o. male who presents for Leg Pain (sore on the back of the left thigh since saturday last week)        Patient complains of sore on his left posterior thigh times 1 week.  He states that originally it felt larger and inflamed as well as slightly painful.  He feels that it is much improved but is having a difficult time assessing it due to location.  Patient does see a dermatologist regularly and states that he had a lesion removed on his chest to last year.  He is unsure of the diagnosis but states that it was cancerous.  Patient is not putting any topical medications on the area.      Review of Systems   Constitutional: Negative for chills and fever.   Gastrointestinal: Negative for nausea and vomiting.       PMH:  has a past medical history of Arrhythmia; Arthritic-like pain; Arthritis; CAD (coronary artery disease) (7/20/2012); Cerebral palsy (HCC); Chronic neck pain (7/20/2012); Cold feeling; Dyspnea (7/20/2012); Elevated cholesterol; Fatigue (7/20/2012); Hyperlipidemia (7/20/2012); and Renal disorder.  MEDS:   Current Outpatient Prescriptions:   •  atorvastatin (LIPITOR) 40 MG Tab, Take 1 Tab by mouth every 48 hours., Disp: 45 Tab, Rfl: 4  •  pregabalin (LYRICA) 75 MG Cap, Take 1 Cap by mouth 3 times a day., Disp: 90 Cap, Rfl: 6  •  zolpidem (AMBIEN) 10 MG Tab, , Disp: , Rfl:   •  amitriptyline (ELAVIL) 50 MG Tab, One bid., Disp: 180 Tab, Rfl: 3  •  methocarbamol (ROBAXIN) 750 MG TABS, Take 1 Tab by mouth 3 times a day., Disp: 90 Tab, Rfl: 3  •  lorazepam (ATIVAN) 1 MG TABS, Take 1 Tab by mouth 2 Times a Day. One bid., Disp: 60 Tab, Rfl: 3  •  aspirin (ASA) 325 MG TABS, Take 325 mg by mouth every day., Disp: , Rfl:   ALLERGIES:   Allergies   Allergen Reactions   • Tetanus Toxoid Anaphylaxis   • Crestor [Rosuvastatin]      Gi upset and myalgias     SURGHX:   Past Surgical History:   Procedure Laterality Date   • PB INJ DX/THER AGNT PARAVERT FACET JOINT, CE*  "Right 2/25/2016    Procedure: INJ PARA FACET CT/ 1 LVL W/IG - C2, 3, 4, 5;  Surgeon: Handy Reynoso M.D.;  Location: [facility];  Service: Pain Management   • PB INJ DX/THER AGNT PARAVERT FACET JOINT, CE*  2/25/2016    Procedure: INJ PARA FACET C/T 2D LVL W/IG ;  Surgeon: Handy Reynoso M.D.;  Location: [facility];  Service: Pain Management   • PB INJ DX/THER AGNT PARAVERT FACET JOINT, CE*  2/25/2016    Procedure: INJ PARA FACET C/T 3D LVL W/IG;  Surgeon: Handy Reynoso M.D.;  Location: [facility];  Service: Pain Management   • CERVICAL DISK AND FUSION ANTERIOR  5/12/2010    Performed by VASHTI FULLER at [facility]   • HAND SURGERY  1996    left hand   • CERVICAL DISK AND FUSION ANTERIOR  1990   • CATARACT EXTRACTION WITH IOL Bilateral     one eye  10 years ago and the other eye 20 years   • TONSILLECTOMY       SOCHX:  reports that he has been smoking Cigarettes.  He has a 40.50 pack-year smoking history. He has never used smokeless tobacco. He reports that he drinks alcohol. He reports that he does not use drugs.  FH: Family history was reviewed, no pertinent findings to report   Objective:   /90 (BP Location: Left arm, Patient Position: Sitting, BP Cuff Size: Adult)   Pulse 76   Temp 37.2 °C (98.9 °F) (Temporal)   Resp 16   Ht 1.88 m (6' 2\")   Wt 72.6 kg (160 lb)   SpO2 96%   BMI 20.54 kg/m²   Physical Exam   Constitutional: He appears well-developed and well-nourished.   HENT:   Head: Normocephalic and atraumatic.   Neck: Neck supple.   Pulmonary/Chest: Effort normal. No respiratory distress.   Neurological: He is alert.   Skin: Skin is warm and dry. Capillary refill takes less than 2 seconds.   .5 cm symmetrical, erythematous lesion with overlying crusting on back of pt's left thigh. Non tender. No discharge. No raised or rolled borders. No ulceration.  There is hair growth within the lesion.   Psychiatric: He has a normal mood and " affect. His behavior is normal. Thought content normal.   Vitals reviewed.        Assessment/Plan:   1. Ingrown hair    Physical exam consistent with resolving ingrown hair.  No evidence of infection.  Low clinical suspicion for basal cell or squamous cell carcinoma.  Patient advised that lesions should fully resolve in the next 7-10 days.  If lesion fails to fully resolve he was instructed to follow-up with his dermatologist for reevaluation.      Differential diagnosis, natural history, supportive care, and indications for immediate follow-up discussed.

## 2019-04-09 NOTE — TELEPHONE ENCOUNTER
ESTABLISHED PATIENT PRE-VISIT PLANNING     Patient was contacted to complete PVP.     Note: Patient will not be contacted if there is no indication to call.     1.  Reviewed notes from the last few office visits within the medical group: Yes    2.  If any orders were placed at last visit or intended to be done for this visit (i.e. 6 mos follow-up), do we have Results/Consult Notes?        •  Labs - Labs ordered, NOT completed. Patient advised to complete prior to next appointment.   Note: If patient appointment is for lab review and patient did not complete labs, check with provider if OK to reschedule patient until labs completed.       •  Imaging - Imaging ordered, completed and results are in chart.       •  Referrals - No referrals were ordered at last office visit.    3. Is this appointment scheduled as a Hospital Follow-Up? No    4.  Immunizations were updated in Epic using WebIZ?: Epic matches WebIZ       •  Web Iz Recommendations: SHINGRIX (Shingles)    5.  Patient is due for the following Health Maintenance Topics:   Health Maintenance Due   Topic Date Due   • IMM ZOSTER VACCINES (1 of 2) 04/12/1994   • COLONOSCOPY  01/21/2019   • Annual Wellness Visit  04/06/2019           6. Orders for overdue Health Maintenance topics pended in Pre-Charting? N\A    7.  AHA (MDX) form printed for Provider? YES    8.  Patient was informed to arrive 15 min prior to their scheduled appointment and bring in their medication bottles.

## 2019-04-22 PROBLEM — N64.4 BREAST PAIN, RIGHT: Status: RESOLVED | Noted: 2018-04-05 | Resolved: 2019-01-01

## 2019-04-22 PROBLEM — S06.0X0A CONCUSSION WITH NO LOSS OF CONSCIOUSNESS: Status: RESOLVED | Noted: 2018-10-15 | Resolved: 2019-01-01

## 2019-04-22 PROBLEM — F41.9 ANXIETY: Status: ACTIVE | Noted: 2019-01-01

## 2019-04-22 PROBLEM — G62.9 PERIPHERAL NEURITIS: Status: ACTIVE | Noted: 2019-01-01

## 2019-04-22 NOTE — PROGRESS NOTES
"Subjective:      Alvaro Metcalf Jr. is a 74 y.o. male who presents with No chief complaint on file.        HPI    Left foot numbness  Patient reports episodes of left foot numbness onset 2-3 months ago. He reports some skin discoloration when the episodes come on. The numbness will extend up his leg to his mid-left shin and down to his toes. Episodes generally come on after walking on flat surfaces without shoes and will resolve with running his left knee under warm water.     The patient is here for followup of chronic medical problems listed below. The patient is compliant with medications and having no side effects from them. Denies chest pain, abdominal pain, dyspnea, myalgias, or cough.    Mixed hyperlipidemia  Patient takes atorvastatin 40 mg PO for regulation of his chronic hyperlipidemia. Lipid profile on 4/15/19 shows a total cholesterol of 140, triglycerides of 214, HDL of 36, and LDL of 61.     IFG (impaired fasting glucose)  Patient regulates his impaired fasting glucose with dieta and exercise. Labs on 4/15/19 show a glucose of 145 and an A1C of 6.5. He admits to eating high-sugar foods after dinner and drinking a large amount of milk.    Chronic neck pain  Patient is followed by Dr. Reynoso, pain management, for treatment of his chronic pain. He takes lyrica 75 mg for treatment. He has also received cortisol injections in the past which have been effective for treatment.     Primary osteoarthritis  Patient reports recent worsening of his osteoarthritis and says it is now \"everywhere.\" He recently changed from aleve to meloxicam for treatment which offers some relief.     Tobacco dependence  Patient has no plans to discontinue his cigarette use.     Health maintenance  Patient's last colonoscopy 5 years ago was negative.     Annual Health Assessment Questions:     1.  Are you currently engaging in any exercise or physical activity? Yes - walks 2-3 times weekly     2.  How would you describe your " "mood or emotional well-being today? \"whatever\"     3.  Have you had any falls in the last year? No     4.  Have you noticed any problems with your balance or had difficulty walking? Yes     5.  In the last six months have you experienced any leakage of urine? No     6. DPA/Advanced Directive: Patient has Advanced Directive on file.     Patient Active Problem List   Diagnosis   • Chronic neck pain- S/P cervical spinal fusion- followed by pain mgt- dr white   • Other cerebral palsy (HCC)   • Burning sensation   • S/P cervical spinal fusion   • Empty sella syndrome (HCC)   • Hypogonadism male- no rx   • Mixed hyperlipidemia   • Primary insomnia   • Spondylosis of cervical region without myelopathy or radiculopathy   • IFG (impaired fasting glucose)   • Vitamin D deficiency   • Smoking greater than 30 pack years (2/3 pack a day for 52 yrs= 34 pk yrs)   • Primary osteoarthritis involving multiple joints   • Tobacco dependence   • Encounter for screening for lung cancer   • Benign essential tremor- ref neuro; trial  beta blockers; r/o parkinsons   • Loss of balance   • Risk for falls   • Breast pain, right   • Concussion with no loss of consciousness   • Anxiety       Outpatient Medications Prior to Visit   Medication Sig Dispense Refill   • atorvastatin (LIPITOR) 40 MG Tab Take 1 Tab by mouth every 48 hours. 45 Tab 4   • pregabalin (LYRICA) 75 MG Cap Take 1 Cap by mouth 3 times a day. 90 Cap 6   • zolpidem (AMBIEN) 10 MG Tab      • amitriptyline (ELAVIL) 50 MG Tab One bid. 180 Tab 3   • methocarbamol (ROBAXIN) 750 MG TABS Take 1 Tab by mouth 3 times a day. 90 Tab 3   • lorazepam (ATIVAN) 1 MG TABS Take 1 Tab by mouth 2 Times a Day. One bid. 60 Tab 3   • aspirin (ASA) 325 MG TABS Take 325 mg by mouth every day.       No facility-administered medications prior to visit.         Allergies   Allergen Reactions   • Tetanus Toxoid Anaphylaxis   • Crestor [Rosuvastatin]      Gi upset and myalgias       Review of Systems " "  Constitutional: Negative.    HENT: Negative.    Eyes: Negative.    Respiratory: Negative.    Cardiovascular: Negative.    Gastrointestinal: Negative.    Genitourinary: Negative.    Musculoskeletal: Negative.    Skin: Negative.    Neurological: Positive for sensory change (numbness, left foot).   Endo/Heme/Allergies: Negative.    Psychiatric/Behavioral: Negative.    All other systems reviewed and are negative.           Objective:     /60 (BP Location: Left arm, Patient Position: Sitting, BP Cuff Size: Adult)   Pulse 78   Temp 36.7 °C (98.1 °F) (Temporal)   Ht 1.88 m (6' 2\")   Wt 71.2 kg (157 lb)   SpO2 92%   BMI 20.16 kg/m²     Physical Exam   Constitutional: Oriented to person, place, and time. Appears well-developed and well-nourished. No distress.   Head: Normocephalic and atraumatic.   Right Ear: External ear normal.   Left Ear: External ear normal.   Nose: Nose normal.   Mouth/Throat: Oropharynx is clear and moist. No oropharyngeal exudate.   Eyes: Pupils are equal, round, and reactive to light. Conjunctivae and EOM are normal. Right eye exhibits no discharge. Left eye exhibits no discharge. No scleral icterus.   Neck: Normal range of motion. Neck supple. No JVD present. No tracheal deviation present. No thyromegaly present.   Cardiovascular: Normal rate, regular rhythm, normal heart sounds. No pulses in left ankle.  Exam reveals no gallop and no friction rub. No murmur heard.  Pulmonary/Chest: Effort normal. No stridor. No respiratory distress. No wheezing or rales. No tenderness.   Abdominal: Soft. Bowel sounds are normal. No distension and no mass. There is no tenderness. There is no rebound and no guarding. No hernia.   Musculoskeletal: Normal range of motion No edema or tenderness.   Lymphadenopathy: No cervical adenopathy.   Neurological: Alert and oriented to person, place, and time. Normal reflexes. Normal reflexes. No cranial nerve deficit. Normal muscle tone. Coordination normal.   Skin: " Skin is warm and dry. No rash noted. Not diaphoretic. No erythema. No pallor.   Psychiatric: Normal mood and affect. Behavior is normal. Judgment and thought content normal.   Nursing note and vitals reviewed.      Lab Results   Component Value Date/Time    HBA1C 6.5 (H) 04/15/2019 11:15 AM    HBA1C 5.8 (H) 06/11/2015 11:13 AM     Lab Results   Component Value Date/Time    SODIUM 141 04/15/2019 11:15 AM    POTASSIUM 3.9 04/15/2019 11:15 AM    CHLORIDE 107 04/15/2019 11:15 AM    CO2 28 04/15/2019 11:15 AM    GLUCOSE 145 (H) 04/15/2019 11:15 AM    BUN 28 (H) 04/15/2019 11:15 AM    CREATININE 0.92 04/15/2019 11:15 AM    CREATININE 1.17 03/07/2011 12:00 AM    BUNCREATRAT 19 03/07/2011 12:00 AM    GLOMRATE >59 03/07/2011 12:00 AM    ALKPHOSPHAT 68 04/15/2019 11:15 AM    ASTSGOT 24 04/15/2019 11:15 AM    ALTSGPT 27 04/15/2019 11:15 AM    TBILIRUBIN 0.7 04/15/2019 11:15 AM     Lab Results   Component Value Date/Time    INR 1.02 05/04/2010 02:28 PM    INR 1.06 11/25/2009 05:45 AM     Lab Results   Component Value Date/Time    CHOLSTRLTOT 140 04/15/2019 11:15 AM    LDL 61 04/15/2019 11:15 AM    HDL 36 (A) 04/15/2019 11:15 AM    TRIGLYCERIDE 214 (H) 04/15/2019 11:15 AM       Lab Results   Component Value Date/Time    TESTOSTERONE 285 (L) 03/05/2014 08:34 AM     No results found for: TSH  Lab Results   Component Value Date/Time    FREET4 0.64 05/29/2018 02:33 PM    FREET4 0.82 11/07/2013 07:19 AM     No results found for: URICACID  No components found for: VITB12  Lab Results   Component Value Date/Time    25HYDROXY 41 10/11/2016 11:22 AM    25HYDROXY 36 05/25/2012 09:40 AM          Assessment/Plan:     1. Empty sella syndrome (HCC)  Under good control.     2. Other cerebral palsy (HCC)  Under good control. Continue same regimen.     3. IFG (impaired fasting glucose)  Recent labs show elevation of his blood glucose and A1C to the borderline diabetic range. Ordered future labs. If labs show continued worsening, he will require  medication for diabetes.   - HEMOGLOBIN A1C; Future  - MICROALBUMIN CREAT RATIO URINE; Future    4. Tobacco dependence  Patient refuses tobacco cessation.     5. Chronic neck pain- S/P cervical spinal fusion- followed by pain mgt- dr white  Under good control with lyrica and cortisol injections. Continue same regimen. Followed by Dr. White, pain management.     6. Burning sensation  Under good control. Continue same regimen.     7. Primary insomnia  Under good control. Continue same regimen.     8. Anxiety  Under good control with ativan 1 mg. Continue same regimen.     9. Mixed hyperlipidemia  Under good control with atorvastatin. Continue same regimen. Ordered future labs.  - Comp Metabolic Panel; Future  - Lipid Profile; Future  - CBC WITH DIFFERENTIAL; Future    10. Peripheral neuritis (HCC)- left leg  See #13  - REFERRAL TO NEUROLOGY    11. Smoking greater than 30 pack years (2/3 pack a day for 52 yrs= 34 pk yrs)  See #4    12. Primary osteoarthritis involving multiple joints  Patient was recently started on meloxicam for his osteoarthritis with some relief. Will continue to monitor.   - meloxicam (MOBIC) 15 MG tablet; Take 15 mg by mouth every day.     13. Numbness and tingling of left leg  Patient presents with left foot numbness when walking on flat surfaces. He was referred to neurology for continued evaluation. Also ordered ultrasound for assessment. Will continue to follow.   - US-EXTREMITY ARTERY LOWER UNILAT W/ELIO (COMBO) LEFT; Future    40 minute face-to-face encounter took place today.  More than half of this time was spent in the coordination of care of the above problems, as well as counseling.     Rmoero AGUILAR (Stephen), am scribing for, and in the presence of, Chris Hinkle M.D..    Electronically signed by: Romero Fenton (Stephen), 4/22/2019    Chris AGUILAR M.D., personally performed the services described in this documentation, as scribed by Romero Fenton in my presence, and it  is both accurate and complete.

## 2019-04-22 NOTE — PROGRESS NOTES
"Annual Health Assessment Questions:    1.  Are you currently engaging in any exercise or physical activity? Yes - walks 2-3 times weekly    2.  How would you describe your mood or emotional well-being today? \"whatever\"    3.  Have you had any falls in the last year? No    4.  Have you noticed any problems with your balance or had difficulty walking? Yes    5.  In the last six months have you experienced any leakage of urine? No    6. DPA/Advanced Directive: Patient has Advanced Directive on file.     "

## 2019-05-02 NOTE — TELEPHONE ENCOUNTER
Patient came into office stating that he has an appointment scheduled for Natalie Gonzalez at Neurology but wants to be seen by a Doctor.     He is asking for your recommendation on the Doctor for neurology and would like to get seen before 06/17/2019.      Please advise.

## 2019-05-02 NOTE — TELEPHONE ENCOUNTER
Dr. Natalie Gonzalez IS a medical doctor and neurologist..  She is a licensed MD and an excellent neurologist.  You can google her for more information.  She has seen several of my patients since moving here in the last year from [place], and she is very competent.  I recommend her highly.      Getting in to see a neurologist within the next 6 weeks is very reasonable, and you will not be able to find another neurologist locally who can see you that quickly.  If you wish to call around for another neurologist in [facility], you are more than welcome to try.  You can use the same referral authorization to see a different neurologist, as long as they accept your insurance..  Dr. Sharla Orellana or Dr. Dwayne Olmos are also very good neurologist, however they not associated with the [facility] system, so I do not know whether they accept your insurance or not..     If you call them, you can just tell them you have a referral from us.  You can also call [phone] and ask to be referred to one of them instead of Dr. Gonzalez.

## 2019-05-03 NOTE — TELEPHONE ENCOUNTER
Phone Number Called: [phone] (home)       Message: Patient informed of note below through detailed voice message.    Left Message for patient to call back: yes

## 2019-06-03 NOTE — PROGRESS NOTES
Outcome: Left voicemail/ message    Please transfer to [facility]  [phone] when patient returns call.     WebIZ Checked & Epic Updated:  yes     HealthConnect Verified: yes     Attempt # 1

## 2019-06-14 NOTE — PROGRESS NOTES
1. Attempt #:1    2. HealthConnect Verified: yes    3. Verify PCP: yes    4. Review Care Team: yes    5. WebIZ Checked & Epic Updated: Yes  · WebIZ Recommendations: TDAP, SHINGRIX (Shingles) and cpox  · Is patient due for Tdap? NO  · Is patient due for Shingles? Yes    6. Reviewed/Updated the following with patient:       •   Communication Preference Obtained? YES       •   Preferred Pharmacy? YES       •   Preferred Lab? YES       •   Family History (document living status of immediate family members and if + hx of cancer, diabetes, hypertension, hyperlipidemia, heart attack, stroke) YES    7. Annual Wellness Visit Scheduling  · Scheduling Status:Scheduled     8. Care Gap Scheduling (Attempt to Schedule EACH Overdue Care Gap!)     Health Maintenance Due   Topic Date Due   • IMM ZOSTER VACCINES (1 of 2) 04/12/1994   • Annual Wellness Visit  04/06/2019        Scheduled patient for Annual Wellness Visit     9. [employer] Activation: declined    10. [employer] Shiva: no    11. Virtual Visits: no    12. Opt In to Text Messages: no    13. Patient was advised: “This is a free wellness visit. The provider will screen for medical conditions to help you stay healthy. If you have other concerns to address you may be asked to discuss these at a separate visit or there may be an additional fee.”     14. Patient was informed to arrive 15 min prior to their scheduled appointment and bring in their medication bottles.

## 2019-06-19 NOTE — TELEPHONE ENCOUNTER
ANNUAL WELLNESS VISIT PRE-VISIT PLANNING WITH OUTREACH    1.  If any orders were placed at last visit, do we have Results/Consult Notes?        •  Labs - Labs ordered, but not to be completed until 7/19.  Note: If patient appointment is for lab review and patient did not complete labs, check with provider if OK to reschedule patient until labs completed.       •  Imaging - Imaging ordered, completed and results are in chart.       •  Referrals - Referral ordered, patient has NOT been seen.    2.  Immunizations were updated in Epic using WebIZ?:Epic matches WebIZ       •  WebIZ Recommendations: SHINGRIX (Shingles)       •  Is patient due for Tdap? NO       •  Is patient due for Shingrx? YES. Patient was not notified of copay/out of pocket cost.    3.  Patient has the following Care Path diagnoses on Problem List:  NONE    4.  Orders for overdue Health Maintenance topics pended in Pre-Charting? N\A

## 2019-06-24 PROBLEM — B07.9 WART OF SCALP: Status: ACTIVE | Noted: 2019-01-01

## 2019-06-24 NOTE — PROGRESS NOTES
Chief Complaint   Patient presents with   • Annual Wellness Visit     [facility]         HPI:  Alvaro Metcalf Jr. is a 74 y.o. here for Medicare Annual Wellness Visit       Chronic neck pain  Patient has a known history of chronic neck pain which is secondary to a cervical spinal fusion operation. He is followed by Dr. Reynoso, for pain management. He is seeing him approximately every month. He is using methocarbamol 750 mg to help manage pain as well, which he is tolerating well without side effects.    Cerebral palsy  Known history, reviewed. Patient does not report any new concerns or changes pertaining to this symptom.    Dyslipidemia  Patient is taking atorvastatin 40 mg, which he is tolerating well without side effects. Lipid panel from 6/20/19 shows his results are all within normal limits.    Peripheral neuritis  Patient is taking pregabalin 75 mg, which he is tolerating well without side effects. He states that this is helping reduce his pain and would like to continue this    Tobacco dependence  Patient is still smoking, states that is not interested in quitting.    Wart of scalp  Patient verbalizes his concerns that he has a bump to his head and is worried that it may be cancerous or of similar etiology.     Patient Active Problem List    Diagnosis Date Noted   • Wart of scalp 06/24/2019   • Anxiety 04/22/2019   • Peripheral neuritis (HCC)- left leg 04/22/2019   • Benign essential tremor- ref neuro; trial  beta blockers; r/o parkinsons 04/05/2018   • Loss of balance 04/05/2018   • Risk for falls 04/05/2018   • Encounter for screening for lung cancer 10/10/2017   • Primary osteoarthritis involving multiple joints 01/17/2017   • Tobacco dependence 01/17/2017   • Smoking greater than 30 pack years (2/3 pack a day for 52 yrs= 34 pk yrs) 10/17/2016   • IFG (impaired fasting glucose) 04/12/2016   • Vitamin D deficiency 04/12/2016   • Spondylosis of cervical region without myelopathy or radiculopathy 02/25/2016    • Mixed hyperlipidemia 09/14/2015   • Primary insomnia 09/14/2015   • Empty sella syndrome (HCC) 10/30/2013   • Hypogonadism male- no rx 10/30/2013   • Burning sensation 10/07/2013   • S/P cervical spinal fusion 10/07/2013   • Other cerebral palsy (HCC) 08/28/2012   • Chronic neck pain- S/P cervical spinal fusion- followed by pain mgt- dr white 07/20/2012       Current Outpatient Prescriptions   Medication Sig Dispense Refill   • meloxicam (MOBIC) 15 MG tablet Take 15 mg by mouth every day.     • atorvastatin (LIPITOR) 40 MG Tab Take 1 Tab by mouth every 48 hours. 45 Tab 4   • pregabalin (LYRICA) 75 MG Cap Take 1 Cap by mouth 3 times a day. 90 Cap 6   • zolpidem (AMBIEN) 10 MG Tab      • amitriptyline (ELAVIL) 50 MG Tab One bid. 180 Tab 3   • methocarbamol (ROBAXIN) 750 MG TABS Take 1 Tab by mouth 3 times a day. 90 Tab 3   • lorazepam (ATIVAN) 1 MG TABS Take 1 Tab by mouth 2 Times a Day. One bid. 60 Tab 3   • aspirin (ASA) 325 MG TABS Take 325 mg by mouth every day.       No current facility-administered medications for this visit.             Current supplements as per medication list.       Allergies: Tetanus toxoid and Crestor [rosuvastatin]    Current social contact/activities: not much in social activity, pt goes to [employer] 3x a week and to an occasional movie. He sates most of the time by himself. On occasion with someone.      He  reports that he has been smoking Cigarettes.  He has a 40.50 pack-year smoking history. He has never used smokeless tobacco. He reports that he drinks alcohol. He reports that he does not use drugs.  Ready to quit: Not Answered  Counseling given: Not Answered      DPA/Advanced Directive:  Patient has Advanced Directive, but it is not on file. Instructed to bring in a copy to scan into their chart.   pt refuses to bring a copy, simply states to contact Son.  ROS:    Gait: Uses no assistive device  Ostomy: No  Other tubes: No  Amputations: No  Chronic oxygen use: No  Last eye exam:  2018  Wears hearing aids: No   : Denies any urinary leakage during the last 6 months    Screening:    Depression Screening    Little interest or pleasure in doing things?  0 - not at all  Feeling down, depressed , or hopeless? 0 - not at all  Patient Health Questionnaire Score: 0     If depressive symptoms identified deferred to follow up visit unless specifically addressed in assessment and plan.    Interpretation of PHQ-9 Total Score   Score Severity   1-4 No Depression   5-9 Mild Depression   10-14 Moderate Depression   15-19 Moderately Severe Depression   20-27 Severe Depression    Screening for Cognitive Impairment    Three Minute Recall (village, kitchen, baby) 3/3    Daniel clock face with all 12 numbers and set the hands to show 10 past 10.  No Pt requested to re-draw his clock. Pt pointed hands to 10:50.  Cognitive concerns identified deferred for follow up unless specifically addressed in assessment and plan.    Fall Risk Assessment    Has the patient had two or more falls in the last year or any fall with injury in the last year?  No    Safety Assessment    Throw rugs on floor.  Yes  Handrails on all stairs.  No  Good lighting in all hallways.  Yes  Difficulty hearing.  No  Patient counseled about all safety risks that were identified.    Functional Assessment ADLs    Are there any barriers preventing you from cooking for yourself or meeting nutritional needs?  No.    Are there any barriers preventing you from driving safely or obtaining transportation?  No.    Are there any barriers preventing you from using a telephone or calling for help?  No.    Are there any barriers preventing you from shopping?  No.    Are there any barriers preventing you from taking care of your own finances?  No.    Are there any barriers preventing you from managing your medications?  No.    Are there any barriers preventing you from showering, bathing or dressing yourself?  No.    Are you currently engaging in any exercise or  physical activity?  Yes.  2-3 times a weeks walks for about an hour.  What is your perception of your health?  Fair.      Health Maintenance Summary                IMM ZOSTER VACCINES Overdue 4/12/1994     Annual Wellness Visit Overdue 4/6/2019      Done 4/5/2018 Visit Dx: Medicare annual wellness visit, subsequent     Patient has more history with this topic...    LUNG CANCER SCREENING Next Due 1/28/2020      Done 1/28/2019 CT-LUNG CANCER-SCREENING     Patient has more history with this topic...    COLONOSCOPY Next Due 6/3/2024      Done 6/3/2019 REFERRAL TO GI FOR COLONOSCOPY     Patient has more history with this topic...          Patient Care Team:  Chris Hinkle M.D. as PCP - General (Internal Medicine)  Chris Flores M.D. as Consulting Physician (Ophthalmology)  Handy Reynoso M.D. as Consulting Physician (Pain Management)  Heath Nix M.D. (Dermatology)  Rogelio Kelly M.D. (Gastroenterology)  Karlee Gonzalez M.D. (Neurology)        Social History   Substance Use Topics   • Smoking status: Current Every Day Smoker     Packs/day: 0.75     Years: 54.00     Types: Cigarettes   • Smokeless tobacco: Never Used      Comment: 1/2 pk - 3/4 of a pack a day for 45 yrs   • Alcohol use 0.0 oz/week      Comment: Rarely     Family History   Problem Relation Age of Onset   • Heart Attack Father    • Cancer Mother         intestinal cancer     He  has a past medical history of Arrhythmia; Arthritic-like pain; Arthritis; CAD (coronary artery disease) (7/20/2012); Cerebral palsy (HCC); Chronic neck pain (7/20/2012); Cold feeling; Dyspnea (7/20/2012); Elevated cholesterol; Fatigue (7/20/2012); Hyperlipidemia (7/20/2012); and Renal disorder.   Past Surgical History:   Procedure Laterality Date   • PB INJ DX/THER AGNT PARAVERT FACET JOINT, CE* Right 2/25/2016    Procedure: INJ PARA FACET CT/ 1 LVL W/IG - C2, 3, 4, 5;  Surgeon: Handy Reynoso M.D.;  Location: [facility];  Service:  "Pain Management   • PB INJ DX/THER AGNT PARAVERT FACET JOINT, CE*  2/25/2016    Procedure: INJ PARA FACET C/T 2D LVL W/IG ;  Surgeon: Handy White M.D.;  Location: [facility];  Service: Pain Management   • PB INJ DX/THER AGNT PARAVERT FACET JOINT, CE*  2/25/2016    Procedure: INJ PARA FACET C/T 3D LVL W/IG;  Surgeon: Handy White M.D.;  Location: [facility];  Service: Pain Management   • CERVICAL DISK AND FUSION ANTERIOR  5/12/2010    Performed by VASHTI FULLER at SURGERY [facility]   • HAND SURGERY  1996    left hand   • CERVICAL DISK AND FUSION ANTERIOR  1990   • CATARACT EXTRACTION WITH IOL Bilateral     one eye  10 years ago and the other eye 20 years   • TONSILLECTOMY         Exam:   /70 (BP Location: Left arm, Patient Position: Sitting, BP Cuff Size: Adult)   Pulse 72   Temp 37.2 °C (98.9 °F)   Ht 1.88 m (6' 2\")   Wt 67.2 kg (148 lb 3.2 oz)   SpO2 94%  Body mass index is 19.03 kg/m².    Hearing fair.    Dentition fair  Alert, oriented in no acute distress.  Eye contact is good, speech goal directed, affect calm    Assessment and Plan. The following treatment and monitoring plan is recommended: See Below  1. Medicare annual wellness visit, subsequent  As above   2. Chronic neck pain- S/P cervical spinal fusion- followed by pain mgt- dr white  Under good control. Continue same regimen and appointments with Dr. White   3. Other cerebral palsy (HCC)  Under good control. Continue same regimen   4. Mixed hyperlipidemia  Under good control. Continue same regimen of atorvastatin   5. Peripheral neuritis (HCC)- left leg  Under good control. Continue same regimen.   6. Tobacco dependence  Under good control. Continue same regimen.    7. Wart of scalp  Patient instructed to bring the wart to the attention of dermatology next time he attends and appointment as it likely needs to frozen off.       Services suggested: No services needed at this time   Health Care " Screening: Age-appropriate preventive services recommended by USPTF and ACIP covered by Medicare were discussed today. Services ordered if indicated and agreed upon by the patient.  Referrals offered: Community-based lifestyle interventions to reduce health risks and promote self-management and wellness, fall prevention, nutrition, physical activity, tobacco-use cessation, weight loss, and mental health services as per orders if indicated.    Discussion today about general wellness and lifestyle habits:    · Prevent falls and reduce trip hazards; Cautioned about securing or removing rugs.  · Have a working fire alarm and carbon monoxide detector;   · Engage in regular physical activity and social activities     Follow-up: No Follow-up on file.

## 2019-07-02 NOTE — PROGRESS NOTES
Chief Complaint   Patient presents with   • Establish Care     Neuropathic pain       Problem List Items Addressed This Visit     None      Visit Diagnoses     Peripheral polyneuropathy (HCC)        Relevant Orders    REFERRAL TO NEURODIAGNOSTICS (EEG,EP,EMG/NCS/DBS) Modality Requested: EMG/NCS-Comment Extremities (BLE)    Screening for depression        Spastic hemiplegic cerebral palsy (HCC)        Cigar smoker        Declined smoking cessation              History of present illness:  Alvaro Metcalf Jr. 74 y.o. male presents today for neuropathy eval. PMHx: Cerebral palsy with right-sided spastic hemiplegia, cervical stenosis status post cervical fusion.    Pt reports having  symptoms of weakness, numbness, and mild aching pain on the left leg that he noticed 3-4 months ago. Sx started from the foot that gradually went up to his knee. Worse when walking barefoot and walking on hard surfaces. Having pain on his heel when ambulating. He noticed having the same symptoms on the right foot but has not progressed up. No upper extremity involvement.     Gel toe/foot support and wearing shoes help.  Patient is already taking Lyrica 75 mg 3 times daily and amitriptyline 50 mg daily for neuropathic pain.  No side effects    Pt has other c/o severe cold sensitivity and states that no doctors have any answers to this including the ones from [facility].     Hx of elevated blood sugar.     No alcohol intake. Smoking cigarettes. Not interested in quitting.     Had EMG done here in 2014 which was normal.       Past medical history:   Past Medical History:   Diagnosis Date   • Arrhythmia    • Arthritic-like pain    • Arthritis    • CAD (coronary artery disease) 7/20/2012   • Cerebral palsy (HCC)     right side affected   • Chronic neck pain 7/20/2012   • Cold feeling     skin chills, always cold   • Dyspnea 7/20/2012   • Elevated cholesterol    • Fatigue 7/20/2012   • Hyperlipidemia 7/20/2012   • Renal disorder      stones       Past surgical history:   Past Surgical History:   Procedure Laterality Date   • PB INJ DX/THER AGNT PARAVERT FACET JOINT, CE* Right 2/25/2016    Procedure: INJ PARA FACET CT/ 1 LVL W/IG - C2, 3, 4, 5;  Surgeon: Handy Reynoso M.D.;  Location: [facility];  Service: Pain Management   • PB INJ DX/THER AGNT PARAVERT FACET JOINT, CE*  2/25/2016    Procedure: INJ PARA FACET C/T 2D LVL W/IG ;  Surgeon: Handy Reynoso M.D.;  Location: [facility];  Service: Pain Management   • PB INJ DX/THER AGNT PARAVERT FACET JOINT, CE*  2/25/2016    Procedure: INJ PARA FACET C/T 3D LVL W/IG;  Surgeon: Handy Reynoso M.D.;  Location: [facility];  Service: Pain Management   • CERVICAL DISK AND FUSION ANTERIOR  5/12/2010    Performed by VASHTI FULLER at SURGERY [facility]   • HAND SURGERY  1996    left hand   • CERVICAL DISK AND FUSION ANTERIOR  1990   • CATARACT EXTRACTION WITH IOL Bilateral     one eye  10 years ago and the other eye 20 years   • TONSILLECTOMY         Family history:   Family History   Problem Relation Age of Onset   • Heart Attack Father    • Cancer Mother         intestinal cancer       Social history:   Social History     Social History   • Marital status:      Spouse name: N/A   • Number of children: N/A   • Years of education: N/A     Occupational History   • Not on file.     Social History Main Topics   • Smoking status: Current Every Day Smoker     Packs/day: 0.75     Years: 54.00     Types: Cigarettes   • Smokeless tobacco: Never Used      Comment: 1/2 pk - 3/4 of a pack a day for 45 yrs   • Alcohol use 0.0 oz/week      Comment: Rarely   • Drug use: No   • Sexual activity: No     Other Topics Concern   • Not on file     Social History Narrative   • No narrative on file       Current medications:   Current Outpatient Prescriptions   Medication   • Fish Oil-Krill Oil (OMEGA-3 DUAL SPECTRUM PO)   • lysine 500 MG Tab   • Misc Natural Products  "(GLUCOSAMINE CHOND MSM FORMULA PO)   • Multiple Vitamins-Minerals (CENTRUM SILVER PO)   • meloxicam (MOBIC) 15 MG tablet   • atorvastatin (LIPITOR) 40 MG Tab   • pregabalin (LYRICA) 75 MG Cap   • zolpidem (AMBIEN) 10 MG Tab   • amitriptyline (ELAVIL) 50 MG Tab   • methocarbamol (ROBAXIN) 750 MG TABS   • lorazepam (ATIVAN) 1 MG TABS   • aspirin (ASA) 325 MG TABS     No current facility-administered medications for this visit.        Medication Allergy:  Allergies   Allergen Reactions   • Tetanus Toxoid Anaphylaxis   • Crestor [Rosuvastatin]      Gi upset and myalgias       Review of systems:     General: Denies fevers or chills, or nightsweats, or generalized fatigue.    Head: Denies headaches or dizziness or lightheadedness  EENT: Denies vision changes, vision loss or pain, nasal secretion, nasal bleeding, difficulty swallowing, hearing loss, tinnitus, vertigo, ear pain  Endocdrinologic: Denies sweating, heat intolerance. No polyuria or polydipsia.   Respiratory: Denies shortness of breath, cough, sputum, or wheezing  Cardiac: Denies chest pain, palpitations, edema or syncope  Gastrointestinal: Denies nausea, vomiting, no abdominal pain or change in bowel habits, no melena or hematochezia  Urinary: Denies dysuria, frequency, hesitancy, or incontinence.  Dermatologic:  Denies new rash  Musculoskeletal: Chronic neck pain, weakness on right side d/t CP and decreased dexterity on right fingers.    Neurologic: Denies facial droopiness,  change in speech or language, memory loss, abnormal movements, seizures, loss of consciousness, or episodes of confusion.   Psychiatric: Denies anxiety, depression, mood swings, suicidal or homicidal thoughts       Physical examination:   Vitals:    07/02/19 1259   BP: 120/74   BP Location: Left arm   Patient Position: Sitting   BP Cuff Size: Adult   Pulse: 86   Resp: 16   Temp: 36.6 °C (97.8 °F)   TempSrc: Temporal   SpO2: 98%   Weight: 66.9 kg (147 lb 6.4 oz)   Height: 1.88 m (6' 2\") "     General: Patient in no acute distress, pleasant and cooperative.  HEENT: Normocephalic, no signs of acute trauma.   moist conjunctivae. Nares are patent. Oropharynx clear without lesions and normal  hard and soft palates.   Neck: Supple, No carotid bruits. There is normal range of motion. No lymphadenopathy  Resp: clear to auscultation bilaterally. No wheezes or crackles.   CV: RRR, no murmurs.   Abdomen: normoactive bowel sounds, soft, non distended or tender.   Skin: no signs of acute rashes or trauma.   Musculoskeletal: Decreased right fingers dexterity, right-sided spastic hemiplegia.  Psychiatric: No hallucinatory behavior. No symptoms of depression or suicidal ideation. Mood and affect appear normal on exam.     NEUROLOGICAL EXAM:   Mental status, orientation: Awake, alert and fully oriented.   Speech and language: speech is clear and fluent. The patient is able to name, repeat and comprehend.   Memory: There is intact recollection of recent and remote events.   Cranial nerve exam:   CN I: Not examined   CN II: PERRL.   CN III, IV, VI: EOMI; no nystagmus   CN V: Facial sensation intact bilaterally   CN VII: face symmetric   CN VIII: hearing intact to finger rub bilaterally   CN IX, X: palate elevates symmetrically   CN XI: Symmetric shoulder shrug  CN XII: tongue midline. No signs of tongue biting or fasciculations   Motor exam: Strength is 4/5 in the right upper and lower extremities; 5/5 in left extremities.  Mildly increased tone in right upper extremity. No abnormal movements were seen on exam.   Sensory exam reveals normal sense of light touch in all extremities.  Decreased vibration in feet, decrease proprioception in right foot  Deep tendon reflexes: absent ankle jerks, 2+ throughout. Plantar responses are flexor. There is no clonus.   Coordination: shows a normal finger-nose-finger. Normal rapidly alternating movements.   Gait: The patient was able to get up from seated position on first attempt  without requiring assistance. Found to be steady when walking. Movements were fluid with normal arm swing. The patient was able to turn without difficulties or tendency to fall. Romberg exam unremarkable.    ANCILLARY DATA REVIEWED:     Lab Data Review:  Reviewed in chart.  CBC, CMP, lipid, hemoglobin A1c, GFR    Records reviewed:   Reviewed in chart.     Imaging:   MRI cervical 10/6/2016  1.  Postsurgical and degenerative changes in the cervical spine as described above. There is partial effacement of the ventral and dorsal subarachnoid spaces at C6-7 without significant central canal stenosis. There has been no significant interval   change.  2.  Pontine gliosis is seen.    EMG 5/1/2014  IMPRESSION:  Normal study with no electrodiagnostic evidence to suggest a   major axonal neuropathy.  Absence of findings in this study does not preclude   central causes for this patient's symptoms which may better account for   involvement of the rest of his body, aside from his limbs.  If a central   process is suspected clinically, correlation with imaging studies such as MRI   is highly recommended.        ASSESSMENT AND PLAN:    1. Peripheral polyneuropathy (HCC)  - REFERRAL TO NEURODIAGNOSTICS (EEG,EP,EMG/NCS/DBS) Modality Requested: EMG/NCS-Comment Extremities (BLE)    2. Screening for depression    3. Spastic hemiplegic cerebral palsy (HCC)    4. Cigar smoker    5. Declined smoking cessation    6. Cervical stenosis of spine          CLINICAL DISCUSSION:  Symptoms of numbness and tingling in stocking distribution in the lower extremities, left worse than right, that started 3 to 4 months ago may be in keeping with peripheral polyneuropathy related to long history of elevated blood sugar.  Recent hemoglobin A1c 6.2%.     However, it seems like patient already had previous complaints of widespread numbness, burning pain (mostly in the lower extremities) and severe cold sensitivity in the past based on previous neurology note  in 2013.  EMG then was normal.    Pt aware that if he does have polyneuropathy, this is a progressive disease and there is no cure at this time. We can only mask the symptoms and attempt to identify cause to halt/ slow down the progression. Pt verbalized understanding.     Plan:  -EMG/NCS of the bilateral lower extremities  -Lifestyle modification including diet and exercise.  Limiting sugar and carbohydrate intake.  -Fall precautions discussed  -Patient refused to quit smoking      FOLLOW-UP:   Return in about 3 months (around 10/2/2019).  Or after EMG            EDUCATION AND COUNSELING:  -Discussed regular exercise program and prevention of cardiovascular disease, including stroke.   -Discussed healthy lifestyle, including: healthy diet (rich in fruits, vegetables, nuts and healthy oils); proper hydration, and adequate sleep hygiene (allowing 7-8 hrs of overnight sleep).    The patient understands and agrees that due to the complexity of his/her diagnosis, results of any testing and further recommendations will typically be discussed/made during a face to face encounter in my office. The patient and/or family further understands it is their responsibility to keep proper follow up.         Susy Seals, MSN, APRN, FNP-C  [facility]  Office: [phone]  Fax: [phone]

## 2019-08-28 NOTE — PROGRESS NOTES
Subjective:      Alvaro Metcalf Jr. is a 74 y.o. male who presents with Follow-Up (lab review,) and Other (patient has problem with balance- getting worse, patient states he feels like his legs are going to get up)        HPI    Patient states he is still feeling very off balance and having some weakness in his legs. He has an appointment scheduled with his neurologist next week.    The patient is here for followup of chronic medical problems listed below. The patient is compliant with medications and having no side effects from them. Denies chest pain, abdominal pain, dyspnea, myalgias, or cough.    Patient reports history of mixed hyperlipidemia well controlled with Atorvastatin. He notes that he has some more blood work scheduled for October.    Patient Active Problem List   Diagnosis   • Chronic neck pain- S/P cervical spinal fusion- followed by pain mgt- dr white   • Other cerebral palsy (HCC)   • Burning sensation   • S/P cervical spinal fusion   • Empty sella syndrome (HCC)   • Hypogonadism male- no rx   • Mixed hyperlipidemia   • Primary insomnia   • Spondylosis of cervical region without myelopathy or radiculopathy   • IFG (impaired fasting glucose)   • Vitamin D deficiency   • Smoking greater than 30 pack years (2/3 pack a day for 52 yrs= 34 pk yrs)   • Primary osteoarthritis involving multiple joints   • Tobacco dependence   • Encounter for screening for lung cancer   • Benign essential tremor- ref neuro; trial  beta blockers; r/o parkinsons   • Loss of balance   • Risk for falls   • Anxiety   • Peripheral neuritis (HCC)- left leg   • Wart of scalp       Outpatient Medications Prior to Visit   Medication Sig Dispense Refill   • Fish Oil-Krill Oil (OMEGA-3 DUAL SPECTRUM PO) Take  by mouth.     • lysine 500 MG Tab Take 500 mg by mouth every day.     • Misc Natural Products (GLUCOSAMINE CHOND MSM FORMULA PO) Take  by mouth.     • Multiple Vitamins-Minerals (CENTRUM SILVER PO) Take  by mouth.     •  "meloxicam (MOBIC) 15 MG tablet Take 15 mg by mouth every day.     • atorvastatin (LIPITOR) 40 MG Tab Take 1 Tab by mouth every 48 hours. 45 Tab 4   • pregabalin (LYRICA) 75 MG Cap Take 1 Cap by mouth 3 times a day. 90 Cap 6   • zolpidem (AMBIEN) 10 MG Tab      • amitriptyline (ELAVIL) 50 MG Tab One bid. (Patient taking differently: 50 mg. One bid.) 180 Tab 3   • methocarbamol (ROBAXIN) 750 MG TABS Take 1 Tab by mouth 3 times a day. 90 Tab 3   • lorazepam (ATIVAN) 1 MG TABS Take 1 Tab by mouth 2 Times a Day. One bid. 60 Tab 3   • aspirin (ASA) 325 MG TABS Take 325 mg by mouth every day.       No facility-administered medications prior to visit.         Allergies   Allergen Reactions   • Tetanus Toxoid Anaphylaxis   • Crestor [Rosuvastatin]      Gi upset and myalgias       Review of Systems   Constitutional: Negative.    HENT: Negative.    Eyes: Negative.    Respiratory: Negative.    Cardiovascular: Negative.    Gastrointestinal: Negative.    Genitourinary: Negative.    Musculoskeletal: Negative.    Skin: Negative.    Neurological: Negative.    Endo/Heme/Allergies: Negative.    Psychiatric/Behavioral: Negative.    All other systems reviewed and are negative.           Objective:     /68 (BP Location: Left arm, Patient Position: Sitting, BP Cuff Size: Adult)   Pulse 78   Temp 36.4 °C (97.6 °F) (Temporal)   Ht 1.88 m (6' 2\")   Wt 66.7 kg (147 lb)   SpO2 95%   BMI 18.87 kg/m²     Physical Exam   Constitutional: Oriented to person, place, and time. Appears well-developed and well-nourished. No distress.   Head: Normocephalic and atraumatic.   Right Ear: External ear normal.   Left Ear: External ear normal.   Nose: Nose normal.   Mouth/Throat: Oropharynx is clear and moist. No oropharyngeal exudate.   Eyes: Pupils are equal, round, and reactive to light. Conjunctivae and EOM are normal. Right eye exhibits no discharge. Left eye exhibits no discharge. No scleral icterus.   Neck: Normal range of motion. Neck " supple. No JVD present. No tracheal deviation present. No thyromegaly present.   Cardiovascular: Normal rate, regular rhythm, normal heart sounds and intact distal pulses.  Exam reveals no gallop and no friction rub.    No murmur heard.  Pulmonary/Chest: Effort normal. No stridor. No respiratory distress. No wheezing or rales. No tenderness.   Abdominal: Soft. Bowel sounds are normal. No distension and no mass. There is no tenderness. There is no rebound and no guarding. No hernia.   Musculoskeletal: Normal range of motion No edema or tenderness.   Lymphadenopathy: No cervical adenopathy.   Neurological: Alert and oriented to person, place, and time. Normal reflexes. Normal reflexes. No cranial nerve deficit. Normal muscle tone. Coordination normal.   Skin: Skin is warm and dry. No rash noted. Not diaphoretic. No erythema. No pallor.   Psychiatric: Normal mood and affect. Behavior is normal. Judgment and thought content normal.   Nursing note and vitals reviewed.      Lab Results   Component Value Date/Time    HBA1C 6.2 (H) 06/20/2019 10:26 AM    HBA1C 6.5 (H) 04/15/2019 11:15 AM     Lab Results   Component Value Date/Time    SODIUM 143 08/21/2019 11:00 AM    POTASSIUM 3.7 08/21/2019 11:00 AM    CHLORIDE 106 08/21/2019 11:00 AM    CO2 26 08/21/2019 11:00 AM    GLUCOSE 97 08/21/2019 11:00 AM    BUN 30 (H) 08/21/2019 11:00 AM    CREATININE 0.95 08/21/2019 11:00 AM    CREATININE 1.17 03/07/2011 12:00 AM    BUNCREATRAT 19 03/07/2011 12:00 AM    GLOMRATE >59 03/07/2011 12:00 AM    ALKPHOSPHAT 65 08/21/2019 11:00 AM    ASTSGOT 21 08/21/2019 11:00 AM    ALTSGPT 14 08/21/2019 11:00 AM    TBILIRUBIN 0.7 08/21/2019 11:00 AM     Lab Results   Component Value Date/Time    INR 1.02 05/04/2010 02:28 PM    INR 1.06 11/25/2009 05:45 AM     Lab Results   Component Value Date/Time    CHOLSTRLTOT 124 08/21/2019 11:00 AM    LDL 62 08/21/2019 11:00 AM    HDL 41 08/21/2019 11:00 AM    TRIGLYCERIDE 105 08/21/2019 11:00 AM       Lab  Results   Component Value Date/Time    TESTOSTERONE 285 (L) 03/05/2014 08:34 AM     No results found for: TSH  Lab Results   Component Value Date/Time    FREET4 0.64 05/29/2018 02:33 PM    FREET4 0.82 11/07/2013 07:19 AM     No results found for: URICACID  No components found for: VITB12  Lab Results   Component Value Date/Time    25HYDROXY 41 10/11/2016 11:22 AM    25HYDROXY 36 05/25/2012 09:40 AM          Assessment/Plan:     #1.  Loss of balance.  Patient has good strength on exam.  No apparent neurological deficits.  Probably secondary to aging process.  Referral back to neurology to exclude significant neurodegenerative process.    2.  Anxiety.  Under good control.  Continue current regimens.    3.  Dyslipidemia.  Under good control.  Continue current regimen.  Diet and exercise.    4.  Primary insomnia under good control.  Continue same regimen.  Follow-up in 3 months.              30 minute face-to-face encounter took place today.  More than half of this time was spent in the coordination of care of the above problems, as well as counseling.     Rigo AGUILAR (Stephen), am scribing for, and in the presence of, Chris Hinkle M.D..    Electronically signed by: Rigo Sexton (Stephen), 8/28/2019    Chris AGUILAR M.D., personally performed the services described in this documentation, as scribed by Rigo Sexton in my presence, and it is both accurate and complete.

## 2019-09-23 ENCOUNTER — APPOINTMENT (RX ONLY)
Dept: URBAN - METROPOLITAN AREA CLINIC 4 | Facility: CLINIC | Age: 74
Setting detail: DERMATOLOGY
End: 2019-09-23

## 2019-09-23 DIAGNOSIS — L82.1 OTHER SEBORRHEIC KERATOSIS: ICD-10-CM

## 2019-09-23 PROBLEM — D48.5 NEOPLASM OF UNCERTAIN BEHAVIOR OF SKIN: Status: ACTIVE | Noted: 2019-09-23

## 2019-09-23 PROCEDURE — 11102 TANGNTL BX SKIN SINGLE LES: CPT

## 2019-09-23 PROCEDURE — ? BIOPSY BY SHAVE METHOD

## 2019-09-23 ASSESSMENT — LOCATION SIMPLE DESCRIPTION DERM: LOCATION SIMPLE: RIGHT FOREHEAD

## 2019-09-23 ASSESSMENT — LOCATION ZONE DERM: LOCATION ZONE: FACE

## 2019-09-23 ASSESSMENT — LOCATION DETAILED DESCRIPTION DERM: LOCATION DETAILED: RIGHT INFERIOR LATERAL FOREHEAD

## 2019-09-23 NOTE — PROCEDURE: BIOPSY BY SHAVE METHOD
Biopsy Type: H and E
Electrodesiccation And Curettage Text: The wound bed was treated with electrodesiccation and curettage after the biopsy was performed.
Anesthesia Volume In Cc: 0
Type Of Destruction Used: Curettage
Consent: Written consent was obtained and risks were reviewed including but not limited to scarring, infection, bleeding, scabbing, incomplete removal, nerve damage and allergy to anesthesia.
Render In Bullet Format When Appropriate: No
Was A Bandage Applied: Yes
Hemostasis: Pressure
Dressing: bandage
Silver Nitrate Text: The wound bed was treated with silver nitrate after the biopsy was performed.
Cryotherapy Text: The wound bed was treated with cryotherapy after the biopsy was performed.
Post-Care Instructions: I reviewed with the patient in detail post-care instructions. Patient is to keep the biopsy site dry overnight, and then apply bacitracin twice daily until healed. Patient may apply hydrogen peroxide soaks to remove any crusting.
Billing Type: Third-Party Bill
Biopsy Method: Dermablade
Detail Level: Detailed
Lab: [id number]
Curettage Text: The wound bed was treated with curettage after the biopsy was performed.
Electrodesiccation Text: The wound bed was treated with electrodesiccation after the biopsy was performed.
Notification Instructions: Patient will be notified of biopsy results. However, patient instructed to call the office if not contacted within 2 weeks.
Depth Of Biopsy: dermis
Anesthesia Type: 1% lidocaine with 1:100,000 epinephrine and a 1:12 solution of 8.4% sodium bicarbonate
Wound Care: Petrolatum
Lab Facility:

## 2019-09-23 NOTE — PROGRESS NOTES
"JOE Metcalf Jr. is a 74 y.o. male here for a non-provider visit for:   FLU    Reason for immunization: Annual Flu Vaccine  Immunization records indicate need for vaccine: Yes, confirmed with Epic  Minimum interval has been met for this vaccine: Yes  ABN completed: Not Indicated    Order and dose verified by: TF  VIS Dated  8-15-19 was given to patient: Yes  All IAC Questionnaire questions were answered \"No.\"    Patient tolerated injection and no adverse effects were observed or reported: Yes    Pt scheduled for next dose in series: No    "

## 2019-10-01 NOTE — PROCEDURES
NERVE CONDUCTION STUDIES AND ELECTROMYOGRAPHY REPORT        10/01/19      Referring provider: Chris Hinkle M.D.      SUMMARY OF PATIENT'S CLINICAL HISTORY,PHYSICAL EXAM, AND RATIONALE FOR TESTING:    Mr. Alvaro Metcalf Jr. 74 y.o. presenting with lower extremity numbness and abnormal balance.    Past Medical History is significant for :   Past Medical History:   Diagnosis Date   • Arrhythmia    • Arthritic-like pain    • Arthritis    • CAD (coronary artery disease) 7/20/2012   • Cerebral palsy (HCC)     right side affected   • Chronic neck pain 7/20/2012   • Cold feeling     skin chills, always cold   • Dyspnea 7/20/2012   • Elevated cholesterol    • Fatigue 7/20/2012   • Hyperlipidemia 7/20/2012   • Renal disorder     stones         The electrodiagnostic studies were performed to evaluate for possible peripheral neuropathy.      ELECTRODIAGNOSTIC EXAMINATION:  Nerve conduction studies (NCS) and electromyography (EMG) are utilized to evaluate direct or indirect damage to the peripheral nervous system. NCS are performed to measure the nerve(s) response(s) to electrostimulation across a given nerve segment. EMG evaluates the passive and active electrical activity of the muscle(s) in question.  Muscles are innervated by specific peripheral nerves and roots. Often times, several nerves the muscle to be examined in order to determine the presence or absence of the disease process. Furthermore, nerves and muscles may need to be tested in a [id number] comparison, as well as in additional extremities, as this may be crucial in characterizing the extent of the disease process, which may be diffuse or isolated and of varying degree of severity. The extent of the neurodiagnostic exam is justified as it may help arrive to a proper diagnosis, which ultimately may contribute to better management of the patient. Therefore, the nerves to muscles examined during the study were medically necessary.    Unless otherwise  noted, temperature of the extremity(s) study was monitored before and during the examination and remained between 32 and 36 degrees C for the upper extremities, and between 30 and 36 degrees C for the lower extremities.      NERVE CONDUCTION STUDIES:  Motor nerves:  - Bilateral Tibial nerves were examined. Recording electrodes placed at the Abductor     Hallucis muscles. The responses were abnormal bilaterally.  Onset latency was normal bilaterally with decreased amplitude at 3.0 mV on the left 3.5 mV on the right and conduction velocity was mildly slowed at 39 m/s bilaterally.  - Bilateral Deep Peroneal motor nerves were examined. Recording electrodes were placed at the Extensor Digitorum Brevis muscles.The responses were abnormal bilaterally.  Onset latency was within normal limits bilaterally, amplitude was decreased to 0.7 mV on the left and 1.2 mV on the right and conduction velocity was decreased to 39 m/s on the left and 34 m/s on the right.    No temporal dispersion or conduction block observed in any of the motor nerves examined.        ELECTROMYOGRAPHY:  The study was performed the concentric needle electrode. Fibrillation and fasciculation activity is graded by convention from none (0) to continuous (4+).  Needle electrode examination was performed in the following muscles: Bilateral vastus lateralis, tibialis anterior, gastrocnemius, extensor digitorum brevis and abductor hallucis.  The right abductor hallucis and extensor digitorum brevis exhibited a mild increase in insertional activity with 1+ fibrillation potentials.  All other muscles studied were normal electrophysiologically.  Nerve Conduction Studies     Stim Site NR Onset (ms) Norm Onset (ms) O-P Amp (mV) Norm O-P Amp Site1 Site2 Delta-0 (ms) Dist (cm) Glen (m/s) Norm Glen (m/s)   Left Peroneal EDB Motor (Ext Dig Brev)   Ankle    5.8 <6 *0.7 >2.5 B Fib Ankle 9.0 35.0 *39 >40   B Fib    14.8  0.6  Poplt B Fib 2.3 10.0 43    Poplt    17.1  0.4           Right Peroneal EDB Motor (Ext Dig Brev)   Ankle    5.3 <6 *1.2 >2.5 B Fib Ankle 10.3 35.0 *34 >40   B Fib    15.6  0.7  Poplt B Fib 1.4 10.0 71    Poplt    17.0  1.0          Left Tibial Motor (Abd Berman Brev)   Ankle    4.5 <6 *3.0 >4 Knee Ankle 11.8 45.5 *39 >40   Knee    16.3  1.3          Right Tibial Motor (Abd Berman Brev)   Ankle    4.8 <6 *3.5 >4 Knee Ankle 10.4 41.0 *39 >40   Knee    15.2  2.6                        Electromyography     Side Muscle Nerve Root Ins Act Fibs Psw Amp Dur Poly Recrt Int Pat Comment   Right VastusLat Femoral L2-4 Nml Nml Nml Nml Nml 0 Nml Nml    Right AntTibialis Dp Br Fibular L4-5 Nml Nml Nml Nml Nml 0 Nml Nml    Right Gastroc Tibial S1-2 Nml Nml Nml Nml Nml 0 Nml Nml    Right Ext Dig Brev Dp Br Fibular L5, S1 *Incr *1+ Nml Nml Nml 0 Nml Nml    Right AbdHallucis MedPlantar S1-2 Nml *1+ *1+ Nml Nml 0 Nml Nml    Left VastusLat Femoral L2-4 Nml Nml Nml Nml Nml 0 Nml Nml    Left AntTibialis Dp Br Fibular L4-5 Nml Nml Nml Nml Nml 0 Nml Nml    Left Gastroc Tibial S1-2 Nml Nml Nml Nml Nml 0 Nml Nml    Left Ext Dig Brev Dp Br Fibular L5, S1 Nml Nml Nml Nml Nml 0 Nml Nml    Left AbdHallucis MedPlantar S1-2 Nml Nml Nml Nml Nml 0 Nml Nml        DIAGNOSTIC INTERPRETATION:  Extensive electrodiagnostic studies were performed to the bilateral lower extremities.  The results are as follows:    1.  Multiple low amplitude lower extremity  motor nerve conduction studies bilaterally consistent with an axonal neuropathy.    2.  Mild acute denervation changes noted in the right extensor digitorum brevis and abductor hallucis.  This could be consistent with an axonal neuropathy, an L5 radiculopathy could also produce these changes.    3.  Clinical correlation is suggested, lumbar spinal stenosis can also produce multiple low amplitude motor nerve conduction studies.        JAHAIRA Orellana M.D.

## 2019-10-08 NOTE — PROGRESS NOTES
"Chief Complaint   Patient presents with   • New Patient     neuritis       Problem List Items Addressed This Visit     None      Visit Diagnoses     Peripheral polyneuropathy (HCC)        Screening for depression        Spastic hemiplegic cerebral palsy (HCC)        Cigar smoker        Declined smoking cessation        At high risk for falls        Altered glucose metabolism              History of present illness:  Alvaro Sales Jr. 74 y.o. male presents today for f/u    Pt states he is \"some\" better compared to before. Still on Lyrica 75 mg 3 times daily and amitriptyline 50 mg qhs. No side effects. He c/o unsteadiness but no falls.     He states he exercises daily and his blood sugar has gone down since he limited his intake of milk. He denied having diagnosed with diabetes in the past but states that his A1c hs gone up to 6.8 before.     He denies any back pain.     Had EMG done    Past medical history:   Past Medical History:   Diagnosis Date   • Arrhythmia    • Arthritic-like pain    • Arthritis    • CAD (coronary artery disease) 7/20/2012   • Cerebral palsy (HCC)     right side affected   • Chronic neck pain 7/20/2012   • Cold feeling     skin chills, always cold   • Dyspnea 7/20/2012   • Elevated cholesterol    • Fatigue 7/20/2012   • Hyperlipidemia 7/20/2012   • Renal disorder     stones       Past surgical history:   Past Surgical History:   Procedure Laterality Date   • PB INJ DX/THER AGNT PARAVERT FACET JOINT, CE* Right 2/25/2016    Procedure: INJ PARA FACET CT/ 1 LVL W/IG - C2, 3, 4, 5;  Surgeon: Handy Reynoso M.D.;  Location: SURGERY SURGICAL [facility] ORS;  Service: Pain Management   • PB INJ DX/THER AGNT PARAVERT FACET JOINT, CE*  2/25/2016    Procedure: INJ PARA FACET C/T 2D LVL W/IG ;  Surgeon: Handy Reynoso M.D.;  Location: SURGERY [facility] ORS;  Service: Pain Management   • PB INJ DX/THER AGNT PARAVERT FACET JOINT, CE*  2/25/2016    Procedure: INJ PARA FACET C/T 3D LVL W/IG;  " Surgeon: Handy Reynoso M.D.;  Location: SURGERY SURGICAL [facility] ORS;  Service: Pain Management   • CERVICAL DISK AND FUSION ANTERIOR  5/12/2010    Performed by VASHTI FULLER at SURGERY [facility]   • HAND SURGERY  1996    left hand   • CERVICAL DISK AND FUSION ANTERIOR  1990   • CATARACT EXTRACTION WITH IOL Bilateral     one eye  10 years ago and the other eye 20 years   • TONSILLECTOMY         Family history:   Family History   Problem Relation Age of Onset   • Heart Attack Father    • Cancer Mother         intestinal cancer       Social history:   Social History     Socioeconomic History   • Marital status:      Spouse name: Not on file   • Number of children: Not on file   • Years of education: Not on file   • Highest education level: Not on file   Occupational History   • Not on file   Social Needs   • Financial resource strain: Not on file   • Food insecurity:     Worry: Not on file     Inability: Not on file   • Transportation needs:     Medical: Not on file     Non-medical: Not on file   Tobacco Use   • Smoking status: Current Every Day Smoker     Packs/day: 0.75     Years: 54.00     Pack years: 40.50     Types: Cigarettes   • Smokeless tobacco: Never Used   • Tobacco comment: 1/2 pk - 3/4 of a pack a day for 45 yrs   Substance and Sexual Activity   • Alcohol use: Yes     Alcohol/week: 0.0 oz     Comment: Rarely   • Drug use: No   • Sexual activity: Never   Lifestyle   • Physical activity:     Days per week: Not on file     Minutes per session: Not on file   • Stress: Not on file   Relationships   • Social connections:     Talks on phone: Not on file     Gets together: Not on file     Attends Sabianism service: Not on file     Active member of club or organization: Not on file     Attends meetings of clubs or organizations: Not on file     Relationship status: Not on file   • Intimate partner violence:     Fear of current or ex partner: Not on file     Emotionally abused: Not on file      Physically abused: Not on file     Forced sexual activity: Not on file   Other Topics Concern   • Not on file   Social History Narrative   • Not on file       Current medications:   Current Outpatient Medications   Medication   • Fish Oil-Krill Oil (OMEGA-3 DUAL SPECTRUM PO)   • lysine 500 MG Tab   • Misc Natural Products (GLUCOSAMINE CHOND MSM FORMULA PO)   • Multiple Vitamins-Minerals (CENTRUM SILVER PO)   • meloxicam (MOBIC) 15 MG tablet   • atorvastatin (LIPITOR) 40 MG Tab   • pregabalin (LYRICA) 75 MG Cap   • zolpidem (AMBIEN) 10 MG Tab   • amitriptyline (ELAVIL) 50 MG Tab   • methocarbamol (ROBAXIN) 750 MG TABS   • lorazepam (ATIVAN) 1 MG TABS   • aspirin (ASA) 325 MG TABS     No current facility-administered medications for this visit.        Medication Allergy:  Allergies   Allergen Reactions   • Tetanus Toxoid Anaphylaxis   • Crestor [Rosuvastatin]      Gi upset and myalgias       Review of systems:     General: Denies fevers or chills, or nightsweats, or generalized fatigue.    Head: Denies headaches or dizziness or lightheadedness  EENT: Denies vision changes, vision loss or pain, nasal secretion, nasal bleeding, difficulty swallowing, hearing loss, tinnitus, vertigo, ear pain  Respiratory: Denies shortness of breath, cough, sputum, or wheezing  Cardiac: Denies chest pain, palpitations, edema or syncope  Gastrointestinal: Denies nausea, vomiting, no abdominal pain or change in bowel habits, no melena or hematochezia  Urinary: Denies dysuria, frequency, hesitancy, or incontinence.  Dermatologic:  Denies new rash  Musculoskeletal: Chronic neck pain, weakness on right side d/t CP and decreased dexterity on right fingers.   Neurologic: Denies facial droopiness, muscle weakness (focal or generalized), paresthesias, ataxia, change in speech or language, memory loss, abnormal movements, seizures, loss of consciousness, or episodes of confusion.   Psychiatric: Denies anxiety, depression, mood swings, suicidal or  "homicidal thoughts       Physical examination:   Vitals:    10/08/19 1245   BP: 138/58   BP Location: Left arm   Patient Position: Sitting   BP Cuff Size: Adult   Pulse: 82   Resp: 18   Temp: (!) 35.7 °C (96.3 °F)   TempSrc: Temporal   SpO2: 96%   Weight: 66.6 kg (146 lb 12.8 oz)   Height: 1.88 m (6' 2\")     General: Patient in no acute distress, pleasant and cooperative.  HEENT: Normocephalic, no signs of acute trauma.   moist conjunctivae. Nares are patent. Oropharynx clear without lesions and normal  hard and soft palates.   Neck: Supple. There is normal range of motion.   Resp: clear to auscultation bilaterally. No wheezes or crackles.   CV: RRR, no murmurs.   Skin: no signs of acute rashes or trauma.   Musculoskeletal: Decreased right fingers dexterity, right-sided spastic hemiplegia.  Psychiatric: No hallucinatory behavior. No symptoms of depression or suicidal ideation. Mood and affect appear normal on exam.      NEUROLOGICAL EXAM:   Mental status, orientation: Awake, alert and fully oriented.   Speech and language: speech is clear and fluent. The patient is able to name, repeat and comprehend.   Memory: There is intact recollection of recent and remote events.   Cranial nerve exam:   CN I: Not examined   CN II: PERRL.   CN III, IV, VI: EOMI; no nystagmus   CN V: Facial sensation intact bilaterally   CN VII: face symmetric   CN VIII: hearing intact to finger rub bilaterally   CN IX, X: palate elevates symmetrically   CN XI: Symmetric shoulder shrug  CN XII: tongue midline. No signs of tongue biting or fasciculations   Motor exam: Strength is 4/5 in the right upper and lower extremities; 5/5 in left extremities.  Mildly increased tone in right upper extremity. No abnormal movements were seen on exam.   Sensory exam reveals normal sense of light touch in all extremities.  Decreased vibration in feet, decrease proprioception in right foot  Deep tendon reflexes: absent ankle jerks, 2+ throughout. Plantar " responses are flexor. There is no clonus.   Coordination: shows a normal finger-nose-finger. Normal rapidly alternating movements.   Gait: The patient was able to get up from seated position on first attempt without requiring assistance. Found to be steady when walking. Movements were fluid with normal arm swing. The patient was able to turn without difficulties or tendency to fall. Romberg exam unremarkable.    ANCILLARY DATA REVIEWED:     Lab Data Review:  Reviewed in chart. No recent lab work    Records reviewed:   Reviewed in chart.     Imaging:   MRI cervical 10/6/2016  1.  Postsurgical and degenerative changes in the cervical spine as described above. There is partial effacement of the ventral and dorsal subarachnoid spaces at C6-7 without significant central canal stenosis. There has been no significant interval   change.  2.  Pontine gliosis is seen.     EMG 5/1/2014  IMPRESSION:  Normal study with no electrodiagnostic evidence to suggest a   major axonal neuropathy.  Absence of findings in this study does not preclude   central causes for this patient's symptoms which may better account for   involvement of the rest of his body, aside from his limbs.  If a central   process is suspected clinically, correlation with imaging studies such as MRI   is highly recommended.    EMG 10/1/2019 - Dr. Orellana  DIAGNOSTIC INTERPRETATION:  Extensive electrodiagnostic studies were performed to the   bilateral lower extremities.  The results are as follows:    1.  Multiple low amplitude lower extremity  motor nerve   conduction studies bilaterally consistent with an axonal   neuropathy.    2.  Mild acute denervation changes noted in the right extensor   digitorum brevis and abductor hallucis.  This could be consistent   with an axonal neuropathy, an L5 radiculopathy could also produce   these changes.    3.  Clinical correlation is suggested, lumbar spinal stenosis can   also produce multiple low amplitude motor nerve  conduction   studies.          ASSESSMENT AND PLAN:    1. Peripheral polyneuropathy (HCC)    2. Screening for depression    3. Spastic hemiplegic cerebral palsy (HCC)    4. Cigar smoker    5. Declined smoking cessation    6. At high risk for falls        CLINICAL DISCUSSION:  Symptoms of numbness and tingling in stocking distribution in the lower extremities, left worse than right, that started 3 to 4 months ago may be in keeping with peripheral polyneuropathy related to long history of elevated blood sugar. However, it seems like patient already had previous complaints of widespread numbness, burning pain (mostly in the lower extremities) and severe cold sensitivity in the past years based on previous neurology note in 2013.  EMG then was normal. Recent hemoglobin A1c 6.2%. Recent EMG study was consistent with axonal neuropathy but is not ruling out L5 radiculopathy. Pt now has c/o orthostatic hypotension and unsteadiness which may also be related to his neuropathy.     Pt aware that polyneuropathy is a progressive disease and there is no cure at this time. We can only mask the symptoms and attempt to identify cause to halt/ slow down the progression. Pt verbalized understanding.     Mood is good. No SI or HI.     Plan:  -Continue lyrica 75mg TID and amitriptyline 50mg QHS. No side effects.     -Pt does not want further diagnostic work-up and lab work.     -Pt refuses MRI lumbar spine to r/o radiculopathy.     - Discussed fall precaution. May need to use assistive devices.      -Lifestyle modification including diet and exercise.  Limiting sugar and carbohydrate intake.    -Discussed smoking cessation. Patient refused           FOLLOW-UP:   Return in about 1 year (around 10/8/2020).          EDUCATION AND COUNSELING:  -Discussed regular exercise program and prevention of cardiovascular disease, including stroke.   -Discussed healthy lifestyle, including: healthy diet (rich in fruits, vegetables, nuts and healthy  oils); proper hydration, and adequate sleep hygiene (allowing 7-8 hrs of overnight sleep).    The patient understands and agrees that due to the complexity of his/her diagnosis, results of any testing and further recommendations will typically be discussed/made during a face to face encounter in my office. The patient and/or family further understands it is their responsibility to keep proper follow up.         Susy Seals, MSN, APRN, FNP-C  [facility]  Office: [phone]  Fax: [phone]\

## 2019-10-21 NOTE — TELEPHONE ENCOUNTER
----- Message from Chris Hinkle M.D. sent at 10/18/2019  6:40 PM PDT -----  Call pt with normal results.

## 2019-10-22 PROBLEM — R07.9 CHEST PAIN, EXERTIONAL: Status: ACTIVE | Noted: 2019-01-01

## 2019-10-22 NOTE — PROGRESS NOTES
Subjective:      Alvaro Metcalf Jr. is a 74 y.o. male who presents with Follow-Up (FV on lab work)        HPI    The patient is here for followup of chronic medical problems listed below. The patient is compliant with medications and having no side effects from them. Denies chest pain, abdominal pain, dyspnea, myalgias, or cough.    Patient Active Problem List   Diagnosis   • Chronic neck pain- S/P cervical spinal fusion- followed by pain mgt- dr white   • Other cerebral palsy (HCC)   • Burning sensation   • S/P cervical spinal fusion   • Empty sella syndrome (HCC)   • Hypogonadism male- no rx   • Mixed hyperlipidemia   • Primary insomnia   • Spondylosis of cervical region without myelopathy or radiculopathy   • IFG (impaired fasting glucose)   • Vitamin D deficiency   • Smoking greater than 30 pack years (2/3 pack a day for 52 yrs= 34 pk yrs)   • Primary osteoarthritis involving multiple joints   • Tobacco dependence   • Encounter for screening for lung cancer   • Benign essential tremor- ref neuro; trial  beta blockers; r/o parkinsons   • Loss of balance   • Risk for falls   • Anxiety   • Peripheral neuritis (HCC)- left leg   • Wart of scalp   • Chest pain, exertional       Outpatient Medications Prior to Visit   Medication Sig Dispense Refill   • amitriptyline (ELAVIL) 50 MG Tab Take 1 Tab by mouth every bedtime. 90 Tab 3   • pregabalin (LYRICA) 75 MG Cap Take 1 Cap by mouth 3 times a day. 90 Cap 11   • Fish Oil-Krill Oil (OMEGA-3 DUAL SPECTRUM PO) Take  by mouth.     • lysine 500 MG Tab Take 500 mg by mouth every day.     • Misc Natural Products (GLUCOSAMINE CHOND MSM FORMULA PO) Take  by mouth.     • Multiple Vitamins-Minerals (CENTRUM SILVER PO) Take  by mouth.     • meloxicam (MOBIC) 15 MG tablet Take 15 mg by mouth every day.     • atorvastatin (LIPITOR) 40 MG Tab Take 1 Tab by mouth every 48 hours. 45 Tab 4   • zolpidem (AMBIEN) 10 MG Tab      • methocarbamol (ROBAXIN) 750 MG TABS Take 1 Tab by  "mouth 3 times a day. 90 Tab 3   • lorazepam (ATIVAN) 1 MG TABS Take 1 Tab by mouth 2 Times a Day. One bid. 60 Tab 3   • aspirin (ASA) 325 MG TABS Take 325 mg by mouth every day.       No facility-administered medications prior to visit.         Allergies   Allergen Reactions   • Tetanus Toxoid Anaphylaxis   • Crestor [Rosuvastatin]      Gi upset and myalgias       ROS         Objective:     /72 (BP Location: Left arm, Patient Position: Sitting, BP Cuff Size: Adult)   Pulse 78   Temp 36.6 °C (97.9 °F) (Temporal)   Ht 1.88 m (6' 2\")   Wt 66.7 kg (147 lb)   SpO2 96%   BMI 18.87 kg/m²     Physical Exam   Constitutional: Oriented to person, place, and time. Appears well-developed and well-nourished. No distress.   Head: Normocephalic and atraumatic.   Right Ear: External ear normal.   Left Ear: External ear normal.   Nose: Nose normal.   Mouth/Throat: Oropharynx is clear and moist. No oropharyngeal exudate.   Eyes: Pupils are equal, round, and reactive to light. Conjunctivae and EOM are normal. Right eye exhibits no discharge. Left eye exhibits no discharge. No scleral icterus.   Neck: Normal range of motion. Neck supple. No JVD present. No tracheal deviation present. No thyromegaly present.   Cardiovascular: Normal rate, regular rhythm, normal heart sounds and intact distal pulses.  Exam reveals no gallop and no friction rub.    No murmur heard.  Pulmonary/Chest: Effort normal. No stridor. No respiratory distress. No wheezing or rales. No tenderness.   Abdominal: Soft. Bowel sounds are normal. No distension and no mass. There is no tenderness. There is no rebound and no guarding. No hernia.   Musculoskeletal: Normal range of motion No edema or tenderness.   Lymphadenopathy: No cervical adenopathy.   Neurological: Alert and oriented to person, place, and time. Normal reflexes. Normal reflexes. No cranial nerve deficit. Normal muscle tone. Coordination normal.   Skin: Skin is warm and dry. No rash noted. Not " diaphoretic. No erythema. No pallor.   Psychiatric: Normal mood and affect. Behavior is normal. Judgment and thought content normal.   Nursing note and vitals reviewed.      Lab Results   Component Value Date/Time    HBA1C 6.2 (H) 06/20/2019 10:26 AM    HBA1C 6.5 (H) 04/15/2019 11:15 AM     Lab Results   Component Value Date/Time    SODIUM 143 08/21/2019 11:00 AM    POTASSIUM 3.7 08/21/2019 11:00 AM    CHLORIDE 106 08/21/2019 11:00 AM    CO2 26 08/21/2019 11:00 AM    GLUCOSE 97 08/21/2019 11:00 AM    BUN 30 (H) 08/21/2019 11:00 AM    CREATININE 0.95 08/21/2019 11:00 AM    CREATININE 1.17 03/07/2011 12:00 AM    BUNCREATRAT 19 03/07/2011 12:00 AM    GLOMRATE >59 03/07/2011 12:00 AM    ALKPHOSPHAT 65 08/21/2019 11:00 AM    ASTSGOT 21 08/21/2019 11:00 AM    ALTSGPT 14 08/21/2019 11:00 AM    TBILIRUBIN 0.7 08/21/2019 11:00 AM     Lab Results   Component Value Date/Time    INR 1.02 05/04/2010 02:28 PM    INR 1.06 11/25/2009 05:45 AM     Lab Results   Component Value Date/Time    CHOLSTRLTOT 124 08/21/2019 11:00 AM    LDL 62 08/21/2019 11:00 AM    HDL 41 08/21/2019 11:00 AM    TRIGLYCERIDE 105 08/21/2019 11:00 AM       Lab Results   Component Value Date/Time    TESTOSTERONE 285 (L) 03/05/2014 08:34 AM     No results found for: TSH  Lab Results   Component Value Date/Time    FREET4 0.64 05/29/2018 02:33 PM    FREET4 0.82 11/07/2013 07:19 AM     No results found for: URICACID  No components found for: VITB12  Lab Results   Component Value Date/Time    25HYDROXY 41 10/11/2016 11:22 AM    25HYDROXY 36 05/25/2012 09:40 AM          Assessment/Plan:     1. Chest pain, exertional- neg MPI and ECHO 10/2019  At his last visit, patient complained of exertional chest pain. He states this has mostly resolved. ECHO and Stress test in 10/2019 was normal. Under good control. Continue same regimen.     2. Mixed hyperlipidemia  Lipid panel completed on 8/21/19. Cholesterol 124; Triglycerides 105; HDL 41; LDL 62. Under good control. Continue  same regimen of Atorvastatin 40mg.    - Comp Metabolic Panel; Future  - Lipid Profile; Future  - CBC WITH DIFFERENTIAL; Future    3. Primary insomnia  Under good control. Continue same regimen of Atorvastatin 40mg .    4. Smoking greater than 30 pack years (2/3 pack a day for 52 yrs= 34 pk yrs)  Urged patient to stop smoking, but he is not interested at this time.    5. Tobacco dependence  Urged patient to stop smoking, but he is not interested at this time.    6. Vitamin D deficiency  Under good control. Continue same regimen of Vitamin D supplementation.    7. IFG (impaired fasting glucose)  Fasting glucose on 8/21/19 was 97. A1C on 6/20/19 was 6.2%. Under good control. Continue same regimen of high protein low carb diet.    - HEMOGLOBIN A1C; Future    8. Anxiety  Under good control. Continue same regimen of Lorazepam 1mg twice daily.    9. Chronic neck pain- S/P cervical spinal fusion- followed by pain mgt- dr white  Under good control. Continue same regimen of Lyrica 75mg TID. Continue to follow with pain management.    10. Need for vaccination  - Zoster Vac Recomb Adjuvanted (SHINGRIX) 50 MCG/0.5ML Recon Susp; 0.5 mL by Intramuscular route Once for 1 dose.  Dispense: 0.5 mL; Refill: 0       30 minute face-to-face encounter took place today.  More than half of this time was spent in the coordination of care of the above problems, as well as counseling.     Rigo AGUILAR (Stephen), am scribing for, and in the presence of, Chris Hinkle M.D..    Electronically signed by: Rigo Sexton (Stephen), 10/22/2019    IChris M.D., personally performed the services described in this documentation, as scribed by Rigo Sexton in my presence, and it is both accurate and complete.

## 2019-11-25 ENCOUNTER — APPOINTMENT (RX ONLY)
Dept: URBAN - METROPOLITAN AREA CLINIC 4 | Facility: CLINIC | Age: 74
Setting detail: DERMATOLOGY
End: 2019-11-25

## 2019-11-25 DIAGNOSIS — L82.0 INFLAMED SEBORRHEIC KERATOSIS: ICD-10-CM

## 2019-11-25 DIAGNOSIS — L81.4 OTHER MELANIN HYPERPIGMENTATION: ICD-10-CM

## 2019-11-25 DIAGNOSIS — I87.2 VENOUS INSUFFICIENCY (CHRONIC) (PERIPHERAL): ICD-10-CM | Status: IMPROVED

## 2019-11-25 DIAGNOSIS — L82.1 OTHER SEBORRHEIC KERATOSIS: ICD-10-CM

## 2019-11-25 DIAGNOSIS — L57.0 ACTINIC KERATOSIS: ICD-10-CM

## 2019-11-25 DIAGNOSIS — D18.0 HEMANGIOMA: ICD-10-CM

## 2019-11-25 DIAGNOSIS — L20.89 OTHER ATOPIC DERMATITIS: ICD-10-CM

## 2019-11-25 DIAGNOSIS — Z85.828 PERSONAL HISTORY OF OTHER MALIGNANT NEOPLASM OF SKIN: ICD-10-CM

## 2019-11-25 PROBLEM — D18.01 HEMANGIOMA OF SKIN AND SUBCUTANEOUS TISSUE: Status: ACTIVE | Noted: 2019-11-25

## 2019-11-25 PROBLEM — L20.84 INTRINSIC (ALLERGIC) ECZEMA: Status: ACTIVE | Noted: 2019-11-25

## 2019-11-25 PROCEDURE — ? COUNSELING

## 2019-11-25 PROCEDURE — ? OBSERVATION

## 2019-11-25 PROCEDURE — ? LIQUID NITROGEN

## 2019-11-25 PROCEDURE — 17000 DESTRUCT PREMALG LESION: CPT

## 2019-11-25 PROCEDURE — 99213 OFFICE O/P EST LOW 20 MIN: CPT | Mod: 25

## 2019-11-25 PROCEDURE — ? LIQUID NITROGEN (COSMETIC)

## 2019-11-25 ASSESSMENT — LOCATION DETAILED DESCRIPTION DERM
LOCATION DETAILED: RIGHT PROXIMAL DORSAL FOREARM
LOCATION DETAILED: LEFT PROXIMAL PRETIBIAL REGION
LOCATION DETAILED: INFERIOR THORACIC SPINE
LOCATION DETAILED: RIGHT MEDIAL UPPER BACK
LOCATION DETAILED: 1ST WEB SPACE RIGHT HAND
LOCATION DETAILED: LEFT AXILLARY VAULT
LOCATION DETAILED: RIGHT INFERIOR LATERAL FOREHEAD
LOCATION DETAILED: RIGHT INFERIOR MEDIAL UPPER BACK
LOCATION DETAILED: LEFT POSTERIOR SHOULDER
LOCATION DETAILED: RIGHT DISTAL PRETIBIAL REGION

## 2019-11-25 ASSESSMENT — LOCATION SIMPLE DESCRIPTION DERM
LOCATION SIMPLE: RIGHT FOREARM
LOCATION SIMPLE: RIGHT UPPER BACK
LOCATION SIMPLE: LEFT SHOULDER
LOCATION SIMPLE: RIGHT FOREHEAD
LOCATION SIMPLE: RIGHT THUMB
LOCATION SIMPLE: LEFT PRETIBIAL REGION
LOCATION SIMPLE: LEFT AXILLARY VAULT
LOCATION SIMPLE: UPPER BACK
LOCATION SIMPLE: RIGHT PRETIBIAL REGION

## 2019-11-25 ASSESSMENT — LOCATION ZONE DERM
LOCATION ZONE: FACE
LOCATION ZONE: AXILLAE
LOCATION ZONE: ARM
LOCATION ZONE: LEG
LOCATION ZONE: FINGER
LOCATION ZONE: TRUNK

## 2019-11-25 NOTE — PROCEDURE: REASSURANCE
Detail Level: Detailed
Detail Level: Generalized
Hide Include Location In Plan Question?: No
Include Location In Plan?: Yes
Detail Level: Zone

## 2019-11-25 NOTE — PROCEDURE: LIQUID NITROGEN
Detail Level: Detailed
Consent: The patient's consent was obtained including but not limited to risks of crusting, scabbing, blistering, scarring, darker or lighter pigmentary change, recurrence, incomplete removal and infection.
Render Note In Bullet Format When Appropriate: No
Duration Of Freeze Thaw-Cycle (Seconds): 5
Post-Care Instructions: I reviewed with the patient in detail post-care instructions. Patient is to wear sunprotection, and avoid picking at any of the treated lesions. Pt may apply Vaseline to crusted or scabbing areas.
Number Of Freeze-Thaw Cycles: 1 freeze-thaw cycle

## 2019-11-26 ENCOUNTER — APPOINTMENT (RX ONLY)
Dept: URBAN - METROPOLITAN AREA CLINIC 4 | Facility: CLINIC | Age: 74
Setting detail: DERMATOLOGY
End: 2019-11-26

## 2019-11-26 DIAGNOSIS — L82.0 INFLAMED SEBORRHEIC KERATOSIS: ICD-10-CM

## 2019-11-26 PROCEDURE — ? LIQUID NITROGEN (COSMETIC)

## 2019-11-26 ASSESSMENT — LOCATION ZONE DERM: LOCATION ZONE: SCALP

## 2019-11-26 ASSESSMENT — LOCATION SIMPLE DESCRIPTION DERM: LOCATION SIMPLE: SCALP

## 2019-11-26 ASSESSMENT — LOCATION DETAILED DESCRIPTION DERM: LOCATION DETAILED: RIGHT INFERIOR PARIETAL SCALP

## 2020-01-01 ENCOUNTER — TELEPHONE (OUTPATIENT)
Dept: MEDICAL GROUP | Age: 75
End: 2020-01-01

## 2020-01-01 ENCOUNTER — HOSPITAL ENCOUNTER (OUTPATIENT)
Dept: LAB | Facility: MEDICAL CENTER | Age: 75
End: 2020-03-17
Attending: INTERNAL MEDICINE
Payer: MEDICARE

## 2020-01-01 ENCOUNTER — HOSPITAL ENCOUNTER (OUTPATIENT)
Dept: RADIOLOGY | Facility: MEDICAL CENTER | Age: 75
End: 2020-02-27
Attending: NURSE PRACTITIONER
Payer: MEDICARE

## 2020-01-01 ENCOUNTER — APPOINTMENT (OUTPATIENT)
Dept: RADIOLOGY | Facility: MEDICAL CENTER | Age: 75
End: 2020-01-01
Attending: EMERGENCY MEDICINE
Payer: MEDICARE

## 2020-01-01 ENCOUNTER — HOSPITAL ENCOUNTER (EMERGENCY)
Facility: MEDICAL CENTER | Age: 75
End: 2020-02-22
Attending: EMERGENCY MEDICINE
Payer: MEDICARE

## 2020-01-01 ENCOUNTER — OFFICE VISIT (OUTPATIENT)
Dept: HEMATOLOGY ONCOLOGY | Facility: MEDICAL CENTER | Age: 75
End: 2020-01-01
Payer: MEDICARE

## 2020-01-01 ENCOUNTER — PATIENT OUTREACH (OUTPATIENT)
Dept: HEALTH INFORMATION MANAGEMENT | Facility: OTHER | Age: 75
End: 2020-01-01

## 2020-01-01 ENCOUNTER — HOME CARE VISIT (OUTPATIENT)
Dept: HOSPICE | Facility: HOSPICE | Age: 75
End: 2020-01-01
Payer: MEDICARE

## 2020-01-01 ENCOUNTER — HOSPITAL ENCOUNTER (EMERGENCY)
Facility: MEDICAL CENTER | Age: 75
End: 2020-02-17
Attending: EMERGENCY MEDICINE
Payer: MEDICARE

## 2020-01-01 ENCOUNTER — HOSPITAL ENCOUNTER (OUTPATIENT)
Dept: LAB | Facility: MEDICAL CENTER | Age: 75
End: 2020-01-07
Attending: ORTHOPAEDIC SURGERY
Payer: MEDICARE

## 2020-01-01 ENCOUNTER — OFFICE VISIT (OUTPATIENT)
Dept: MEDICAL GROUP | Age: 75
End: 2020-01-01
Payer: MEDICARE

## 2020-01-01 ENCOUNTER — HOSPITAL ENCOUNTER (OUTPATIENT)
Dept: LAB | Facility: MEDICAL CENTER | Age: 75
End: 2020-02-25
Attending: NURSE PRACTITIONER
Payer: MEDICARE

## 2020-01-01 ENCOUNTER — HOSPITAL ENCOUNTER (OUTPATIENT)
Dept: LAB | Facility: MEDICAL CENTER | Age: 75
End: 2020-01-16
Attending: INTERNAL MEDICINE
Payer: MEDICARE

## 2020-01-01 ENCOUNTER — APPOINTMENT (OUTPATIENT)
Dept: MEDICAL GROUP | Age: 75
End: 2020-01-01
Payer: MEDICARE

## 2020-01-01 ENCOUNTER — HOSPITAL ENCOUNTER (OUTPATIENT)
Dept: RADIOLOGY | Facility: MEDICAL CENTER | Age: 75
End: 2020-01-28
Attending: OPHTHALMOLOGY
Payer: MEDICARE

## 2020-01-01 ENCOUNTER — TELEPHONE (OUTPATIENT)
Dept: HEMATOLOGY ONCOLOGY | Facility: MEDICAL CENTER | Age: 75
End: 2020-01-01

## 2020-01-01 ENCOUNTER — HOSPITAL ENCOUNTER (OUTPATIENT)
Facility: MEDICAL CENTER | Age: 75
End: 2020-03-09
Attending: INTERNAL MEDICINE | Admitting: INTERNAL MEDICINE
Payer: MEDICARE

## 2020-01-01 ENCOUNTER — APPOINTMENT (OUTPATIENT)
Dept: RADIOLOGY | Facility: MEDICAL CENTER | Age: 75
End: 2020-01-01
Attending: NURSE PRACTITIONER
Payer: MEDICARE

## 2020-01-01 ENCOUNTER — APPOINTMENT (OUTPATIENT)
Dept: RADIOLOGY | Facility: MEDICAL CENTER | Age: 75
End: 2020-01-01
Payer: MEDICARE

## 2020-01-01 ENCOUNTER — HOSPICE ADMISSION (OUTPATIENT)
Dept: HOSPICE | Facility: HOSPICE | Age: 75
End: 2020-01-01
Payer: MEDICARE

## 2020-01-01 ENCOUNTER — APPOINTMENT (OUTPATIENT)
Dept: RADIOLOGY | Facility: MEDICAL CENTER | Age: 75
End: 2020-01-01
Attending: INTERNAL MEDICINE
Payer: MEDICARE

## 2020-01-01 VITALS
HEART RATE: 90 BPM | DIASTOLIC BLOOD PRESSURE: 78 MMHG | RESPIRATION RATE: 16 BRPM | HEIGHT: 74 IN | BODY MASS INDEX: 18.43 KG/M2 | TEMPERATURE: 97.8 F | WEIGHT: 143.63 LBS | OXYGEN SATURATION: 96 % | SYSTOLIC BLOOD PRESSURE: 118 MMHG

## 2020-01-01 VITALS
DIASTOLIC BLOOD PRESSURE: 80 MMHG | OXYGEN SATURATION: 94 % | HEART RATE: 88 BPM | RESPIRATION RATE: 16 BRPM | WEIGHT: 144 LBS | BODY MASS INDEX: 18.48 KG/M2 | SYSTOLIC BLOOD PRESSURE: 124 MMHG | TEMPERATURE: 97.8 F | HEIGHT: 74 IN

## 2020-01-01 VITALS
RESPIRATION RATE: 16 BRPM | HEART RATE: 61 BPM | BODY MASS INDEX: 19.15 KG/M2 | SYSTOLIC BLOOD PRESSURE: 138 MMHG | HEIGHT: 74 IN | DIASTOLIC BLOOD PRESSURE: 84 MMHG | OXYGEN SATURATION: 97 % | TEMPERATURE: 97.5 F | WEIGHT: 149.25 LBS

## 2020-01-01 VITALS
SYSTOLIC BLOOD PRESSURE: 122 MMHG | TEMPERATURE: 98.1 F | WEIGHT: 144.62 LBS | BODY MASS INDEX: 18.56 KG/M2 | HEART RATE: 89 BPM | OXYGEN SATURATION: 95 % | RESPIRATION RATE: 16 BRPM | DIASTOLIC BLOOD PRESSURE: 75 MMHG

## 2020-01-01 VITALS
OXYGEN SATURATION: 94 % | RESPIRATION RATE: 14 BRPM | SYSTOLIC BLOOD PRESSURE: 122 MMHG | DIASTOLIC BLOOD PRESSURE: 82 MMHG | HEART RATE: 86 BPM | TEMPERATURE: 98.4 F

## 2020-01-01 VITALS — HEART RATE: 100 BPM | RESPIRATION RATE: 18 BRPM | DIASTOLIC BLOOD PRESSURE: 60 MMHG | SYSTOLIC BLOOD PRESSURE: 110 MMHG

## 2020-01-01 VITALS
TEMPERATURE: 99 F | RESPIRATION RATE: 16 BRPM | HEART RATE: 88 BPM | BODY MASS INDEX: 19 KG/M2 | OXYGEN SATURATION: 94 % | WEIGHT: 148.04 LBS | SYSTOLIC BLOOD PRESSURE: 118 MMHG | DIASTOLIC BLOOD PRESSURE: 64 MMHG | HEIGHT: 74 IN

## 2020-01-01 VITALS
OXYGEN SATURATION: 95 % | DIASTOLIC BLOOD PRESSURE: 70 MMHG | HEART RATE: 93 BPM | TEMPERATURE: 97.8 F | HEIGHT: 74 IN | BODY MASS INDEX: 18.05 KG/M2 | WEIGHT: 140.6 LBS | SYSTOLIC BLOOD PRESSURE: 120 MMHG

## 2020-01-01 VITALS
RESPIRATION RATE: 14 BRPM | TEMPERATURE: 97.4 F | SYSTOLIC BLOOD PRESSURE: 142 MMHG | OXYGEN SATURATION: 94 % | BODY MASS INDEX: 18.11 KG/M2 | DIASTOLIC BLOOD PRESSURE: 73 MMHG | HEIGHT: 74 IN | WEIGHT: 141.09 LBS | HEART RATE: 76 BPM

## 2020-01-01 VITALS — RESPIRATION RATE: 20 BRPM | HEART RATE: 100 BPM | SYSTOLIC BLOOD PRESSURE: 150 MMHG | DIASTOLIC BLOOD PRESSURE: 100 MMHG

## 2020-01-01 VITALS — SYSTOLIC BLOOD PRESSURE: 110 MMHG | HEART RATE: 76 BPM | RESPIRATION RATE: 18 BRPM | DIASTOLIC BLOOD PRESSURE: 60 MMHG

## 2020-01-01 VITALS — RESPIRATION RATE: 20 BRPM

## 2020-01-01 DIAGNOSIS — G89.3 CANCER ASSOCIATED PAIN: ICD-10-CM

## 2020-01-01 DIAGNOSIS — C78.02 BILATERAL PULMONARY METASTASES: ICD-10-CM

## 2020-01-01 DIAGNOSIS — C78.01 BILATERAL PULMONARY METASTASES: ICD-10-CM

## 2020-01-01 DIAGNOSIS — E23.6 EMPTY SELLA SYNDROME (HCC): ICD-10-CM

## 2020-01-01 DIAGNOSIS — H50.89 DUANE'S SYNDROME: ICD-10-CM

## 2020-01-01 DIAGNOSIS — R73.01 IFG (IMPAIRED FASTING GLUCOSE): ICD-10-CM

## 2020-01-01 DIAGNOSIS — C79.51 MALIGNANT NEOPLASM METASTATIC TO BONE OF SKULL WITH UNKNOWN PRIMARY SITE (HCC): ICD-10-CM

## 2020-01-01 DIAGNOSIS — E78.2 MIXED HYPERLIPIDEMIA: ICD-10-CM

## 2020-01-01 DIAGNOSIS — R74.8 ELEVATED LIVER ENZYMES: ICD-10-CM

## 2020-01-01 DIAGNOSIS — Z11.1 TUBERCULOSIS SCREENING: ICD-10-CM

## 2020-01-01 DIAGNOSIS — C79.51 BONE METASTASES: ICD-10-CM

## 2020-01-01 DIAGNOSIS — F51.01 PRIMARY INSOMNIA: ICD-10-CM

## 2020-01-01 DIAGNOSIS — D49.2 NEOPLASM OF UNSPECIFIED BEHAVIOR OF BONE, SOFT TISSUE, AND SKIN: ICD-10-CM

## 2020-01-01 DIAGNOSIS — S22.31XA CLOSED FRACTURE OF ONE RIB OF RIGHT SIDE, INITIAL ENCOUNTER: ICD-10-CM

## 2020-01-01 DIAGNOSIS — C41.0: ICD-10-CM

## 2020-01-01 DIAGNOSIS — G60.3 IDIOPATHIC PROGRESSIVE NEUROPATHY: ICD-10-CM

## 2020-01-01 DIAGNOSIS — M54.2 CHRONIC NECK PAIN: ICD-10-CM

## 2020-01-01 DIAGNOSIS — G80.8 OTHER CEREBRAL PALSY (HCC): ICD-10-CM

## 2020-01-01 DIAGNOSIS — R93.7 ABNORMAL CT OF SPINE: ICD-10-CM

## 2020-01-01 DIAGNOSIS — R93.0 ABNORMAL MRI OF HEAD: ICD-10-CM

## 2020-01-01 DIAGNOSIS — C79.10 METASTATIC UROTHELIAL CARCINOMA (HCC): ICD-10-CM

## 2020-01-01 DIAGNOSIS — E55.9 VITAMIN D DEFICIENCY: ICD-10-CM

## 2020-01-01 DIAGNOSIS — H53.2 DIPLOPIA: ICD-10-CM

## 2020-01-01 DIAGNOSIS — G25.0 BENIGN ESSENTIAL TREMOR: ICD-10-CM

## 2020-01-01 DIAGNOSIS — C80.1 MALIGNANT NEOPLASM METASTATIC TO BONE OF SKULL WITH UNKNOWN PRIMARY SITE (HCC): ICD-10-CM

## 2020-01-01 DIAGNOSIS — I70.0 ATHEROSCLEROSIS OF ABDOMINAL AORTA (HCC): ICD-10-CM

## 2020-01-01 DIAGNOSIS — R07.9 RIGHT-SIDED CHEST PAIN: ICD-10-CM

## 2020-01-01 DIAGNOSIS — C80.1 METASTATIC CARCINOMA INVOLVING BONE WITH UNKNOWN PRIMARY SITE (HCC): ICD-10-CM

## 2020-01-01 DIAGNOSIS — M15.9 PRIMARY OSTEOARTHRITIS INVOLVING MULTIPLE JOINTS: ICD-10-CM

## 2020-01-01 DIAGNOSIS — G89.29 CHRONIC NECK PAIN: ICD-10-CM

## 2020-01-01 DIAGNOSIS — C79.51 BONY METASTASIS: ICD-10-CM

## 2020-01-01 DIAGNOSIS — R26.89 LOSS OF BALANCE: ICD-10-CM

## 2020-01-01 DIAGNOSIS — K57.90 DIVERTICULOSIS: ICD-10-CM

## 2020-01-01 DIAGNOSIS — F41.9 ANXIETY: ICD-10-CM

## 2020-01-01 DIAGNOSIS — C80.1 CANCER WITH UNKNOWN PRIMARY SITE (HCC): ICD-10-CM

## 2020-01-01 DIAGNOSIS — Z02.9 ADMINISTRATIVE ENCOUNTER: ICD-10-CM

## 2020-01-01 DIAGNOSIS — C79.51 METASTATIC CARCINOMA INVOLVING BONE WITH UNKNOWN PRIMARY SITE (HCC): ICD-10-CM

## 2020-01-01 DIAGNOSIS — Z98.1 S/P CERVICAL SPINAL FUSION: ICD-10-CM

## 2020-01-01 DIAGNOSIS — F02.80 DEMENTIA DUE TO ANOTHER GENERAL MEDICAL CONDITION, WITHOUT BEHAVIORAL DISTURBANCE (HCC): ICD-10-CM

## 2020-01-01 DIAGNOSIS — G62.9 PERIPHERAL NEURITIS: ICD-10-CM

## 2020-01-01 LAB
ACHR BIND AB SER-SCNC: 0 NMOL/L (ref 0–0.4)
ALBUMIN SERPL BCP-MCNC: 4.1 G/DL (ref 3.2–4.9)
ALBUMIN SERPL BCP-MCNC: 4.1 G/DL (ref 3.2–4.9)
ALBUMIN SERPL ELPH-MCNC: 3.67 G/DL (ref 3.75–5.01)
ALBUMIN/GLOB SERPL: 1.3 G/DL
ALBUMIN/GLOB SERPL: 1.6 G/DL
ALP SERPL-CCNC: 115 U/L (ref 30–99)
ALP SERPL-CCNC: 185 U/L (ref 30–99)
ALPHA1 GLOB SERPL ELPH-MCNC: 0.5 G/DL (ref 0.19–0.46)
ALPHA2 GLOB SERPL ELPH-MCNC: 0.74 G/DL (ref 0.48–1.05)
ALT SERPL-CCNC: 17 U/L (ref 2–50)
ALT SERPL-CCNC: 51 U/L (ref 2–50)
ANION GAP SERPL CALC-SCNC: 11 MMOL/L (ref 0–11.9)
ANION GAP SERPL CALC-SCNC: 8 MMOL/L (ref 0–11.9)
AST SERPL-CCNC: 23 U/L (ref 12–45)
AST SERPL-CCNC: 48 U/L (ref 12–45)
B-GLOBULIN SERPL ELPH-MCNC: 0.68 G/DL (ref 0.48–1.1)
BASOPHILS # BLD AUTO: 0.5 % (ref 0–1.8)
BASOPHILS # BLD AUTO: 1 % (ref 0–1.8)
BASOPHILS # BLD: 0.04 K/UL (ref 0–0.12)
BASOPHILS # BLD: 0.08 K/UL (ref 0–0.12)
BILIRUB SERPL-MCNC: 0.5 MG/DL (ref 0.1–1.5)
BILIRUB SERPL-MCNC: 0.7 MG/DL (ref 0.1–1.5)
BUN SERPL-MCNC: 23 MG/DL (ref 8–22)
BUN SERPL-MCNC: 26 MG/DL (ref 8–22)
CALCIUM SERPL-MCNC: 9.1 MG/DL (ref 8.5–10.5)
CALCIUM SERPL-MCNC: 9.4 MG/DL (ref 8.5–10.5)
CHLORIDE SERPL-SCNC: 103 MMOL/L (ref 96–112)
CHLORIDE SERPL-SCNC: 104 MMOL/L (ref 96–112)
CHOLEST SERPL-MCNC: 108 MG/DL (ref 100–199)
CO2 SERPL-SCNC: 27 MMOL/L (ref 20–33)
CO2 SERPL-SCNC: 29 MMOL/L (ref 20–33)
CREAT SERPL-MCNC: 1.06 MG/DL (ref 0.5–1.4)
CREAT SERPL-MCNC: 1.12 MG/DL (ref 0.5–1.4)
EER PROT ELECT SER Q1092: ABNORMAL
EKG IMPRESSION: NORMAL
EKG IMPRESSION: NORMAL
EOSINOPHIL # BLD AUTO: 0.15 K/UL (ref 0–0.51)
EOSINOPHIL # BLD AUTO: 0.29 K/UL (ref 0–0.51)
EOSINOPHIL NFR BLD: 2 % (ref 0–6.9)
EOSINOPHIL NFR BLD: 3.5 % (ref 0–6.9)
ERYTHROCYTE [DISTWIDTH] IN BLOOD BY AUTOMATED COUNT: 42.7 FL (ref 35.9–50)
ERYTHROCYTE [DISTWIDTH] IN BLOOD BY AUTOMATED COUNT: 43.9 FL (ref 35.9–50)
ERYTHROCYTE [DISTWIDTH] IN BLOOD BY AUTOMATED COUNT: 44.5 FL (ref 35.9–50)
EST. AVERAGE GLUCOSE BLD GHB EST-MCNC: 128 MG/DL
EST. AVERAGE GLUCOSE BLD GHB EST-MCNC: 134 MG/DL
FASTING STATUS PATIENT QL REPORTED: NORMAL
GAMMA GLOB SERPL ELPH-MCNC: 0.81 G/DL (ref 0.62–1.51)
GAMMA INTERFERON BACKGROUND BLD IA-ACNC: 0.02 IU/ML
GLOBULIN SER CALC-MCNC: 2.6 G/DL (ref 1.9–3.5)
GLOBULIN SER CALC-MCNC: 3.1 G/DL (ref 1.9–3.5)
GLUCOSE SERPL-MCNC: 115 MG/DL (ref 65–99)
GLUCOSE SERPL-MCNC: 217 MG/DL (ref 65–99)
HBA1C MFR BLD: 6.1 % (ref 0–5.6)
HBA1C MFR BLD: 6.3 % (ref 0–5.6)
HCT VFR BLD AUTO: 41.8 % (ref 42–52)
HCT VFR BLD AUTO: 45.3 % (ref 42–52)
HCT VFR BLD AUTO: 47.3 % (ref 42–52)
HDLC SERPL-MCNC: 47 MG/DL
HGB BLD-MCNC: 14.2 G/DL (ref 14–18)
HGB BLD-MCNC: 14.8 G/DL (ref 14–18)
HGB BLD-MCNC: 15.4 G/DL (ref 14–18)
IGA SERPL-MCNC: 150 MG/DL (ref 68–408)
IGG SERPL-MCNC: 904 MG/DL (ref 768–1632)
IGM SERPL-MCNC: 46 MG/DL (ref 35–263)
IMM GRANULOCYTES # BLD AUTO: 0.04 K/UL (ref 0–0.11)
IMM GRANULOCYTES # BLD AUTO: 0.06 K/UL (ref 0–0.11)
IMM GRANULOCYTES NFR BLD AUTO: 0.5 % (ref 0–0.9)
IMM GRANULOCYTES NFR BLD AUTO: 0.7 % (ref 0–0.9)
INR PPP: 1.1 (ref 0.87–1.13)
INTERPRETATION SERPL IFE-IMP: ABNORMAL
KAPPA LC FREE SER-MCNC: 18.37 MG/L (ref 3.3–19.4)
KAPPA LC FREE/LAMBDA FREE SER NEPH: 1.46 {RATIO} (ref 0.26–1.65)
LAMBDA LC FREE SERPL-MCNC: 12.56 MG/L (ref 5.71–26.3)
LDLC SERPL CALC-MCNC: 40 MG/DL
LYMPHOCYTES # BLD AUTO: 1.19 K/UL (ref 1–4.8)
LYMPHOCYTES # BLD AUTO: 1.68 K/UL (ref 1–4.8)
LYMPHOCYTES NFR BLD: 15.9 % (ref 22–41)
LYMPHOCYTES NFR BLD: 20.4 % (ref 22–41)
M TB IFN-G BLD-IMP: NEGATIVE
M TB IFN-G CD4+ BCKGRND COR BLD-ACNC: 0 IU/ML
MCH RBC QN AUTO: 30.3 PG (ref 27–33)
MCH RBC QN AUTO: 30.3 PG (ref 27–33)
MCH RBC QN AUTO: 30.4 PG (ref 27–33)
MCHC RBC AUTO-ENTMCNC: 32.6 G/DL (ref 33.7–35.3)
MCHC RBC AUTO-ENTMCNC: 32.7 G/DL (ref 33.7–35.3)
MCHC RBC AUTO-ENTMCNC: 34 G/DL (ref 33.7–35.3)
MCV RBC AUTO: 89.3 FL (ref 81.4–97.8)
MCV RBC AUTO: 92.6 FL (ref 81.4–97.8)
MCV RBC AUTO: 93.5 FL (ref 81.4–97.8)
MITOGEN IGNF BCKGRD COR BLD-ACNC: 1.68 IU/ML
MONOCYTES # BLD AUTO: 0.58 K/UL (ref 0–0.85)
MONOCYTES # BLD AUTO: 0.61 K/UL (ref 0–0.85)
MONOCYTES NFR BLD AUTO: 7.4 % (ref 0–13.4)
MONOCYTES NFR BLD AUTO: 7.8 % (ref 0–13.4)
NEUTROPHILS # BLD AUTO: 5.48 K/UL (ref 1.82–7.42)
NEUTROPHILS # BLD AUTO: 5.52 K/UL (ref 1.82–7.42)
NEUTROPHILS NFR BLD: 67 % (ref 44–72)
NEUTROPHILS NFR BLD: 73.3 % (ref 44–72)
NRBC # BLD AUTO: 0 K/UL
NRBC # BLD AUTO: 0 K/UL
NRBC BLD-RTO: 0 /100 WBC
NRBC BLD-RTO: 0 /100 WBC
PATHOLOGY CONSULT NOTE: NORMAL
PLATELET # BLD AUTO: 158 K/UL (ref 164–446)
PLATELET # BLD AUTO: 164 K/UL (ref 164–446)
PLATELET # BLD AUTO: 188 K/UL (ref 164–446)
PMV BLD AUTO: 10.1 FL (ref 9–12.9)
PMV BLD AUTO: 10.6 FL (ref 9–12.9)
PMV BLD AUTO: 10.6 FL (ref 9–12.9)
POTASSIUM SERPL-SCNC: 3.8 MMOL/L (ref 3.6–5.5)
POTASSIUM SERPL-SCNC: 4 MMOL/L (ref 3.6–5.5)
PROT SERPL-MCNC: 6.4 G/DL (ref 6.3–8.2)
PROT SERPL-MCNC: 6.7 G/DL (ref 6–8.2)
PROT SERPL-MCNC: 7.2 G/DL (ref 6–8.2)
PROTHROMBIN TIME: 14.5 SEC (ref 12–14.6)
PSA SERPL-MCNC: 1.28 NG/ML (ref 0–4)
QFT TB2 - NIL TBQ2: 0 IU/ML
RBC # BLD AUTO: 4.68 M/UL (ref 4.7–6.1)
RBC # BLD AUTO: 4.89 M/UL (ref 4.7–6.1)
RBC # BLD AUTO: 5.06 M/UL (ref 4.7–6.1)
SODIUM SERPL-SCNC: 141 MMOL/L (ref 135–145)
SODIUM SERPL-SCNC: 141 MMOL/L (ref 135–145)
T3 SERPL-MCNC: 99.9 NG/DL (ref 60–181)
T4 FREE SERPL-MCNC: 0.75 NG/DL (ref 0.53–1.43)
TRIGL SERPL-MCNC: 106 MG/DL (ref 0–149)
TROPONIN T SERPL-MCNC: 13 NG/L (ref 6–19)
TSH RECEP AB SER-ACNC: <0.9 IU/L
TSH SERPL DL<=0.005 MIU/L-ACNC: 1.6 UIU/ML (ref 0.38–5.33)
TSI ACT/NOR SER: 92 %
WBC # BLD AUTO: 7.5 K/UL (ref 4.8–10.8)
WBC # BLD AUTO: 8.2 K/UL (ref 4.8–10.8)
WBC # BLD AUTO: 8.4 K/UL (ref 4.8–10.8)

## 2020-01-01 PROCEDURE — G0299 HHS/HOSPICE OF RN EA 15 MIN: HCPCS

## 2020-01-01 PROCEDURE — 93005 ELECTROCARDIOGRAM TRACING: CPT | Performed by: EMERGENCY MEDICINE

## 2020-01-01 PROCEDURE — 88342 IMHCHEM/IMCYTCHM 1ST ANTB: CPT

## 2020-01-01 PROCEDURE — 86480 TB TEST CELL IMMUN MEASURE: CPT

## 2020-01-01 PROCEDURE — 84155 ASSAY OF PROTEIN SERUM: CPT

## 2020-01-01 PROCEDURE — 84443 ASSAY THYROID STIM HORMONE: CPT

## 2020-01-01 PROCEDURE — 83520 IMMUNOASSAY QUANT NOS NONAB: CPT

## 2020-01-01 PROCEDURE — S9126 HOSPICE CARE, IN THE HOME, P: HCPCS

## 2020-01-01 PROCEDURE — 93005 ELECTROCARDIOGRAM TRACING: CPT

## 2020-01-01 PROCEDURE — 80053 COMPREHEN METABOLIC PANEL: CPT

## 2020-01-01 PROCEDURE — 82784 ASSAY IGA/IGD/IGG/IGM EACH: CPT

## 2020-01-01 PROCEDURE — 88341 IMHCHEM/IMCYTCHM EA ADD ANTB: CPT

## 2020-01-01 PROCEDURE — 36415 COLL VENOUS BLD VENIPUNCTURE: CPT

## 2020-01-01 PROCEDURE — 99212 OFFICE O/P EST SF 10 MIN: CPT | Performed by: INTERNAL MEDICINE

## 2020-01-01 PROCEDURE — 74177 CT ABD & PELVIS W/CONTRAST: CPT

## 2020-01-01 PROCEDURE — 80061 LIPID PANEL: CPT

## 2020-01-01 PROCEDURE — 70450 CT HEAD/BRAIN W/O DYE: CPT

## 2020-01-01 PROCEDURE — 77012 CT SCAN FOR NEEDLE BIOPSY: CPT

## 2020-01-01 PROCEDURE — 99284 EMERGENCY DEPT VISIT MOD MDM: CPT

## 2020-01-01 PROCEDURE — 160002 HCHG RECOVERY MINUTES (STAT)

## 2020-01-01 PROCEDURE — 99285 EMERGENCY DEPT VISIT HI MDM: CPT

## 2020-01-01 PROCEDURE — 665036 HSPC NOTICE OF ELECTION NOE

## 2020-01-01 PROCEDURE — 85610 PROTHROMBIN TIME: CPT

## 2020-01-01 PROCEDURE — 85025 COMPLETE CBC W/AUTO DIFF WBC: CPT

## 2020-01-01 PROCEDURE — 99214 OFFICE O/P EST MOD 30 MIN: CPT | Performed by: INTERNAL MEDICINE

## 2020-01-01 PROCEDURE — 99215 OFFICE O/P EST HI 40 MIN: CPT | Performed by: INTERNAL MEDICINE

## 2020-01-01 PROCEDURE — 72128 CT CHEST SPINE W/O DYE: CPT

## 2020-01-01 PROCEDURE — 83036 HEMOGLOBIN GLYCOSYLATED A1C: CPT

## 2020-01-01 PROCEDURE — 99214 OFFICE O/P EST MOD 30 MIN: CPT | Performed by: NURSE PRACTITIONER

## 2020-01-01 PROCEDURE — 70543 MRI ORBT/FAC/NCK W/O &W/DYE: CPT

## 2020-01-01 PROCEDURE — 700117 HCHG RX CONTRAST REV CODE 255: Performed by: EMERGENCY MEDICINE

## 2020-01-01 PROCEDURE — 6650990 HC HOSPICE AND HOME CARE RX REV CODE 0250

## 2020-01-01 PROCEDURE — A9552 F18 FDG: HCPCS

## 2020-01-01 PROCEDURE — 8041 PR SCP AHA: Performed by: INTERNAL MEDICINE

## 2020-01-01 PROCEDURE — 84153 ASSAY OF PSA TOTAL: CPT

## 2020-01-01 PROCEDURE — 71045 X-RAY EXAM CHEST 1 VIEW: CPT

## 2020-01-01 PROCEDURE — 700105 HCHG RX REV CODE 258: Performed by: NURSE PRACTITIONER

## 2020-01-01 PROCEDURE — 72131 CT LUMBAR SPINE W/O DYE: CPT

## 2020-01-01 PROCEDURE — 72125 CT NECK SPINE W/O DYE: CPT

## 2020-01-01 PROCEDURE — 99204 OFFICE O/P NEW MOD 45 MIN: CPT | Performed by: NURSE PRACTITIONER

## 2020-01-01 PROCEDURE — 700117 HCHG RX CONTRAST REV CODE 255: Performed by: OPHTHALMOLOGY

## 2020-01-01 PROCEDURE — 84484 ASSAY OF TROPONIN QUANT: CPT

## 2020-01-01 PROCEDURE — 84165 PROTEIN E-PHORESIS SERUM: CPT

## 2020-01-01 PROCEDURE — 85027 COMPLETE CBC AUTOMATED: CPT

## 2020-01-01 PROCEDURE — A9576 INJ PROHANCE MULTIPACK: HCPCS | Performed by: OPHTHALMOLOGY

## 2020-01-01 PROCEDURE — 88341 IMHCHEM/IMCYTCHM EA ADD ANTB: CPT | Mod: 91

## 2020-01-01 PROCEDURE — 83519 RIA NONANTIBODY: CPT | Mod: XU

## 2020-01-01 PROCEDURE — 84480 ASSAY TRIIODOTHYRONINE (T3): CPT

## 2020-01-01 PROCEDURE — 84439 ASSAY OF FREE THYROXINE: CPT

## 2020-01-01 PROCEDURE — 88307 TISSUE EXAM BY PATHOLOGIST: CPT

## 2020-01-01 PROCEDURE — 700111 HCHG RX REV CODE 636 W/ 250 OVERRIDE (IP)

## 2020-01-01 PROCEDURE — 70553 MRI BRAIN STEM W/O & W/DYE: CPT

## 2020-01-01 PROCEDURE — 84445 ASSAY OF TSI GLOBULIN: CPT

## 2020-01-01 RX ORDER — MIDAZOLAM HYDROCHLORIDE 1 MG/ML
INJECTION INTRAMUSCULAR; INTRAVENOUS
Status: COMPLETED
Start: 2020-01-01 | End: 2020-01-01

## 2020-01-01 RX ORDER — SODIUM CHLORIDE 9 MG/ML
INJECTION, SOLUTION INTRAVENOUS CONTINUOUS
Status: CANCELLED | OUTPATIENT
Start: 2020-01-01

## 2020-01-01 RX ORDER — HYDROCODONE BITARTRATE AND ACETAMINOPHEN 10; 325 MG/1; MG/1
1 TABLET ORAL EVERY 8 HOURS PRN
Qty: 90 TAB | Refills: 0 | Status: SHIPPED | OUTPATIENT
Start: 2020-01-01 | End: 2020-01-01

## 2020-01-01 RX ORDER — SODIUM CHLORIDE 9 MG/ML
INJECTION, SOLUTION INTRAVENOUS CONTINUOUS
Status: DISCONTINUED | OUTPATIENT
Start: 2020-01-01 | End: 2020-01-01 | Stop reason: HOSPADM

## 2020-01-01 RX ORDER — OXYCODONE HYDROCHLORIDE 5 MG/1
2.5 TABLET ORAL
Status: DISCONTINUED | OUTPATIENT
Start: 2020-01-01 | End: 2020-01-01 | Stop reason: HOSPADM

## 2020-01-01 RX ORDER — OXYCODONE HYDROCHLORIDE 5 MG/1
5 TABLET ORAL
Status: DISCONTINUED | OUTPATIENT
Start: 2020-01-01 | End: 2020-01-01 | Stop reason: HOSPADM

## 2020-01-01 RX ORDER — NALOXONE HYDROCHLORIDE 0.4 MG/ML
INJECTION, SOLUTION INTRAMUSCULAR; INTRAVENOUS; SUBCUTANEOUS
Status: COMPLETED
Start: 2020-01-01 | End: 2020-01-01

## 2020-01-01 RX ORDER — HYDROMORPHONE HYDROCHLORIDE 2 MG/ML
0.25 INJECTION, SOLUTION INTRAMUSCULAR; INTRAVENOUS; SUBCUTANEOUS
Status: DISCONTINUED | OUTPATIENT
Start: 2020-01-01 | End: 2020-01-01 | Stop reason: HOSPADM

## 2020-01-01 RX ORDER — MIDAZOLAM HYDROCHLORIDE 1 MG/ML
.5-2 INJECTION INTRAMUSCULAR; INTRAVENOUS PRN
Status: DISCONTINUED | OUTPATIENT
Start: 2020-01-01 | End: 2020-01-01 | Stop reason: HOSPADM

## 2020-01-01 RX ORDER — ONDANSETRON 2 MG/ML
4 INJECTION INTRAMUSCULAR; INTRAVENOUS PRN
Status: DISCONTINUED | OUTPATIENT
Start: 2020-01-01 | End: 2020-01-01 | Stop reason: HOSPADM

## 2020-01-01 RX ORDER — SODIUM CHLORIDE 9 MG/ML
500 INJECTION, SOLUTION INTRAVENOUS
Status: DISCONTINUED | OUTPATIENT
Start: 2020-01-01 | End: 2020-01-01 | Stop reason: HOSPADM

## 2020-01-01 RX ADMIN — MIDAZOLAM HYDROCHLORIDE 1 MG: 1 INJECTION INTRAMUSCULAR; INTRAVENOUS at 13:41

## 2020-01-01 RX ADMIN — GADOTERIDOL 15 ML: 279.3 INJECTION, SOLUTION INTRAVENOUS at 14:32

## 2020-01-01 RX ADMIN — MIDAZOLAM HYDROCHLORIDE 1 MG: 1 INJECTION, SOLUTION INTRAMUSCULAR; INTRAVENOUS at 13:41

## 2020-01-01 RX ADMIN — FENTANYL CITRATE 25 MCG: 50 INJECTION, SOLUTION INTRAMUSCULAR; INTRAVENOUS at 13:41

## 2020-01-01 RX ADMIN — SODIUM CHLORIDE: 9 INJECTION, SOLUTION INTRAVENOUS at 13:15

## 2020-01-01 RX ADMIN — IOHEXOL 100 ML: 350 INJECTION, SOLUTION INTRAVENOUS at 17:50

## 2020-01-01 SDOH — ECONOMIC STABILITY: GENERAL

## 2020-01-01 SDOH — HEALTH STABILITY: MENTAL HEALTH: HOW OFTEN DO YOU HAVE A DRINK CONTAINING ALCOHOL?: MONTHLY OR LESS

## 2020-01-01 ASSESSMENT — ENCOUNTER SYMPTOMS
STOOL FREQUENCY: LESS THAN DAILY
NAUSEA: 1
DRY SKIN: 1
NECK PAIN: 1
DIZZINESS: 1
SPUTUM PRODUCTION: 1
NEUROLOGICAL NEGATIVE: 1
PALPITATIONS: 0
SHORTNESS OF BREATH: 0
MYALGIAS: 1
LAST BOWEL MOVEMENT: 65462
SLEEP QUALITY: POOR
DIAPHORESIS: 0
CONSTIPATION: 1
BACK PAIN: 1
DIARRHEA: 0
LAST BOWEL MOVEMENT: 65462
DIZZINESS: 1
BACK PAIN: 1
LAST BOWEL MOVEMENT: 65462
PSYCHIATRIC NEGATIVE: 1
CONSTIPATION: 1
HEADACHES: 0
VOMITING: 1
SLEEP QUALITY: ADEQUATE
WEAKNESS: 1
CONSTIPATION: 1
STOOL FREQUENCY: LESS THAN DAILY
INSOMNIA: 0
DIARRHEA: 0
RESPIRATORY NEGATIVE: 1
GASTROINTESTINAL NEGATIVE: 1
MUSCULOSKELETAL NEGATIVE: 1
ABDOMINAL PAIN: 0
FEVER: 0
CARDIOVASCULAR NEGATIVE: 1
GASTROINTESTINAL NEGATIVE: 1
CONSTIPATION: 1
DESCRIPTION OF MEMORY LOSS: IMMEDIATE
VOMITING: 1
LAST BOWEL MOVEMENT: 65455
NAUSEA: 0
CONSTITUTIONAL NEGATIVE: 1
EYES NEGATIVE: 1
CARDIOVASCULAR NEGATIVE: 1
CONSTIPATION: 0
DESCRIPTION OF MEMORY LOSS: IMMEDIATE
DRY SKIN: 1
VOMITING: 1
SLEEP QUALITY: POOR
SLEEP QUALITY: POOR
CONSTIPATION: 1
NEUROLOGICAL NEGATIVE: 1
TINGLING: 1
DOUBLE VISION: 1
PSYCHIATRIC NEGATIVE: 1
DRY SKIN: 1
COUGH: 0
DRY SKIN: 1
WEIGHT LOSS: 1
RESPIRATORY NEGATIVE: 1
DESCRIPTION OF MEMORY LOSS: IMMEDIATE
STOOL FREQUENCY: LESS THAN DAILY
MYALGIAS: 1
FALLS: 1
VOMITING: 0
NAUSEA: 1
STOOL FREQUENCY: LESS THAN DAILY
NAUSEA: 1

## 2020-01-01 ASSESSMENT — SOCIAL DETERMINANTS OF HEALTH (SDOH)
ACTIVE STRESSOR - HEALTH CHANGES: 1

## 2020-01-01 ASSESSMENT — FIBROSIS 4 INDEX
FIB4 SCORE: 2.68
FIB4 SCORE: 3.07
FIB4 SCORE: 3.07

## 2020-01-01 ASSESSMENT — PATIENT HEALTH QUESTIONNAIRE - PHQ9
CLINICAL INTERPRETATION OF PHQ2 SCORE: 0
CLINICAL INTERPRETATION OF PHQ2 SCORE: 0

## 2020-01-01 ASSESSMENT — PAIN SCALES - GENERAL
PAINLEVEL: 8=MODERATE-SEVERE PAIN
PAINLEVEL: 6=MODERATE PAIN
PAINLEVEL: 10=SEVERE PAIN

## 2020-01-01 ASSESSMENT — LIFESTYLE VARIABLES
DO YOU DRINK ALCOHOL: NO
DO YOU DRINK ALCOHOL: NO

## 2020-01-01 ASSESSMENT — ACTIVITIES OF DAILY LIVING (ADL)
MONEY MANAGEMENT (EXPENSES/BILLS): NEEDS ASSISTANCE

## 2020-02-10 NOTE — TELEPHONE ENCOUNTER
"Telephone call received from patient's ophthalmologist Dr. Doreen Mendes regarding patient's recent MRI of the brain done on 1/28/2020 was without contrast.  This was done to further evaluate his double vision which he has had for few years.    MRI showed an incidental but worrisome finding of a new (in comparison to MRI of the brain from 6/7/2018) focal abnormal lesion in the left occipital condyle \"most suggestive of osseous metastasis\" according to radiology report.    Patient has no known history of cancer or no new symptoms of malignancy.  The ophthalmologist does not feel that the double vision is due to this lesion.      Therefore I asked ophthalmologist to tell the patient come to our office today or tomorrow and will see him briefly and explained the work-up for this, which will simply entail referral to intake oncology nurse coordinator (IOC).  Dr. Mendes said she will explain this MRI finding to the patient, as I myself will do when I see him later today or tomorrow.  "

## 2020-02-11 NOTE — PROGRESS NOTES
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  Diplopia of several years not felt to be related to this new finding compared to MRI of 2 years ago.    MRI result discussed with patient by phone today.  I told him to expect a phone call from [facility] to be scheduled for further evaluation and management.

## 2020-02-11 NOTE — TELEPHONE ENCOUNTER
Received Radiological consultation from "[employer] via fax. Scanned into patients chart for provider review.            HEBER

## 2020-02-17 NOTE — ED TRIAGE NOTES
Pt drove self to ed.     Chief Complaint   Patient presents with   • Fall     near syncopy yesterday about 4pm, pain in r shoulder pain, r chest rib and back pain, possible loc, no injuries noted to head   • Dizziness     increased problems with balance over last year, getting worse now     Pt oriented to ed. ekg completed. Sent to senior rodriguez to wait. Discussed notification of staff for any changes. Protocol ordered

## 2020-02-17 NOTE — ED NOTES
Patient ambulatory from [facility] to [place] [facility] with slow staggering gait accompanied by ED RN. Chart up for ERP.

## 2020-02-18 NOTE — PROGRESS NOTES
Subjective:      Alvaro Metcalf Jr. is a 74 y.o. male who presents as a New Patient for abnormal MRI of head.        HPI    Patient referred to me, Intake Oncology Coordinator by his PCP Dr. Hinkle for abnormal MRI of the head.  Patient is unaccompanied for today's visit.    Patient with multiple chronic medical problems being followed and monitored by his primary care provider, Dr. Hinkle.  Patient had been experiencing double vision and was seen by his ophthalmologist.  They were evaluating his double vision and underwent an MRI of the brain on January 28, 2020.  MRI of the brain showed a new focal abnormal lesion in the left occipital condyle which was suggestive of osseous metastasis.  Based on these findings patient was referred to me for further evaluation due to concerning findings for possible malignancy.  From the time patient was referred to me and time being seen patient ended up having a fall at home and was seen in the emergency department for further evaluation due to significant pain.  It was at that time in the emergency department that patient underwent further imaging including multiple CT scans.    On February 17, 2020 patient had a CT spine of the cervical, thoracic and lumbar spine.  There were multiple lytic lesions involving the cervical spine, upper thoracic spine and school consistent with metastasis.  There was also noted to have multiple lytic lesions in the upper and lower thoracic spine, lumbar spine and pelvis, and CT chest, abdomen and pelvis also completed confirmed extensive osseous metastatic disease throughout.  Patient did have a fracture of the seventh rib on the right likely related to his recent fall.  There is some small pulmonary nodules noted which was consistent with metastatic disease.  Also noted some wall thickening of the gastric antrum which could be related to some gastritis or an underlying mass could not be excluded.  I personally reviewed all CT scans and  "MRIs mentioned above in detail, I reviewed the imaging report and images in detail with the patient today.    As mentioned above patient with significant chronic issues including cerebral palsy.  From his cerebral palsy he does have limitations to the right side.  He was experiencing blurred vision and double vision as well as been experiencing increased balance issues which have been worse lately.  He stated when he fell over the weekend he had most of the impact on the right side of his chest which is likely the cause of his seventh rib fracture.  He does have bruising noted.  He also has back pain in that location as well.  He is taking meloxicam daily per his primary care provider.  He stated he is having difficulty taking a deep breath and coughing from the pain.  Patient notes to have chronic pain issues and states that there are days when the pain is \"horrific.\"    He noticed some numbness in his left foot and most recently in his right foot.  The numbness on his left side is moving up to the knee.  He does have some recent weight loss but stated he is noticed the weight loss after cutting milk out of his diet.  He states that he has sputum production every morning but denies significant cough or shortness of breath.  He denies heart palpitations or swelling in his legs.  He does have constipation and takes magnesium as needed and denies any nausea, vomiting or diarrhea or abdominal pain.  He voids without difficulty.    Please see past medical and surgical history below.    Patient does have a history of cancer in his mother who had intestinal cancer.    Patient is a current smoker.  He has smoked for approximately 54 years and average of three-quarter pack per day.    Allergies   Allergen Reactions   • Tetanus Toxoid Anaphylaxis     Current Outpatient Medications on File Prior to Visit   Medication Sig Dispense Refill   • atorvastatin (LIPITOR) 40 MG Tab Take 1 Tab by mouth every 48 hours. 90 Tab 4   • " amitriptyline (ELAVIL) 50 MG Tab Take 1 Tab by mouth every bedtime. 90 Tab 3   • pregabalin (LYRICA) 75 MG Cap Take 1 Cap by mouth 3 times a day. 90 Cap 11   • Fish Oil-Krill Oil (OMEGA-3 DUAL SPECTRUM PO) Take  by mouth.     • lysine 500 MG Tab Take 500 mg by mouth every day.     • Misc Natural Products (GLUCOSAMINE CHOND MSM FORMULA PO) Take  by mouth.     • Multiple Vitamins-Minerals (CENTRUM SILVER PO) Take  by mouth.     • meloxicam (MOBIC) 15 MG tablet Take 15 mg by mouth every day.     • zolpidem (AMBIEN) 10 MG Tab      • methocarbamol (ROBAXIN) 750 MG TABS Take 1 Tab by mouth 3 times a day. 90 Tab 3   • lorazepam (ATIVAN) 1 MG TABS Take 1 Tab by mouth 2 Times a Day. One bid. 60 Tab 3   • aspirin (ASA) 325 MG TABS Take 325 mg by mouth every day.       No current facility-administered medications on file prior to visit.      Past Medical History:   Diagnosis Date   • Arrhythmia    • Arthritic-like pain    • Arthritis    • CAD (coronary artery disease) 7/20/2012   • Cerebral palsy (HCC)     right side affected   • Chronic neck pain 7/20/2012   • Cold feeling     skin chills, always cold   • Dyspnea 7/20/2012   • Elevated cholesterol    • Fatigue 7/20/2012   • Hyperlipidemia 7/20/2012   • Renal disorder     stones     Past Surgical History:   Procedure Laterality Date   • PB INJ DX/THER AGNT PARAVERT FACET JOINT, CE* Right 2/25/2016    Procedure: INJ PARA FACET CT/ 1 LVL W/IG - C2, 3, 4, 5;  Surgeon: Handy Reynoso M.D.;  Location: [facility];  Service: Pain Management   • PB INJ DX/THER AGNT PARAVERT FACET JOINT, CE*  2/25/2016    Procedure: INJ PARA FACET C/T 2D LVL W/IG ;  Surgeon: Handy Reynoso M.D.;  Location: [facility];  Service: Pain Management   • PB INJ DX/THER AGNT PARAVERT FACET JOINT, CE*  2/25/2016    Procedure: INJ PARA FACET C/T 3D LVL W/IG;  Surgeon: Handy Reynoso M.D.;  Location: [facility];  Service: Pain Management   • CERVICAL DISK AND  FUSION ANTERIOR  5/12/2010    Performed by VASHTI FULLER at [facility]   • HAND SURGERY  1996    left hand   • CERVICAL DISK AND FUSION ANTERIOR  1990   • CATARACT EXTRACTION WITH IOL Bilateral     one eye  10 years ago and the other eye 20 years   • TONSILLECTOMY         Family History   Problem Relation Age of Onset   • Heart Attack Father    • Cancer Mother         intestinal cancer     Social History     Socioeconomic History   • Marital status:      Spouse name: Not on file   • Number of children: Not on file   • Years of education: Not on file   • Highest education level: Not on file   Occupational History   • Not on file   Social Needs   • Financial resource strain: Not on file   • Food insecurity     Worry: Not on file     Inability: Not on file   • Transportation needs     Medical: Not on file     Non-medical: Not on file   Tobacco Use   • Smoking status: Current Every Day Smoker     Packs/day: 0.75     Years: 54.00     Pack years: 40.50     Types: Cigarettes   • Smokeless tobacco: Never Used   • Tobacco comment: 1/2 pk - 3/4 of a pack a day for 45 yrs   Substance and Sexual Activity   • Alcohol use: Yes     Alcohol/week: 0.0 oz     Frequency: Monthly or less     Comment: wine couple times per month   • Drug use: No   • Sexual activity: Never   Lifestyle   • Physical activity     Days per week: Not on file     Minutes per session: Not on file   • Stress: Not on file   Relationships   • Social connections     Talks on phone: Not on file     Gets together: Not on file     Attends Restorationist service: Not on file     Active member of club or organization: Not on file     Attends meetings of clubs or organizations: Not on file     Relationship status: Not on file   • Intimate partner violence     Fear of current or ex partner: Not on file     Emotionally abused: Not on file     Physically abused: Not on file     Forced sexual activity: Not on file   Other Topics Concern   • Not on file  "  Social History Narrative   • Not on file       Review of Systems   Constitutional: Positive for weight loss. Negative for diaphoresis, fever and malaise/fatigue.   Respiratory: Positive for sputum production (every morning). Negative for cough and shortness of breath.    Cardiovascular: Positive for chest pain (left chest pain for the last 4-5 years). Negative for palpitations and leg swelling.   Gastrointestinal: Positive for constipation (takes magnesium as needed). Negative for abdominal pain, diarrhea, nausea and vomiting.   Genitourinary: Negative for dysuria.   Musculoskeletal: Positive for back pain, falls, joint pain, myalgias and neck pain (from previous surgeries as well).   Neurological: Positive for dizziness and tingling.   Psychiatric/Behavioral: The patient does not have insomnia.           Objective:     /64 (BP Location: Left arm, Patient Position: Sitting, BP Cuff Size: Adult)   Pulse 88   Temp 37.2 °C (99 °F) (Temporal)   Resp 16   Ht 1.88 m (6' 2.02\")   Wt 67.1 kg (148 lb 0.6 oz)   SpO2 94%   BMI 19.00 kg/m²      Physical Exam  Vitals signs reviewed.   Constitutional:       General: He is not in acute distress.     Appearance: Normal appearance. He is well-developed. He is not diaphoretic.   HENT:      Head: Normocephalic and atraumatic.      Mouth/Throat:      Mouth: Mucous membranes are moist.      Pharynx: Oropharynx is clear. No oropharyngeal exudate.   Eyes:      General: No scleral icterus.        Right eye: No discharge.         Left eye: No discharge.      Conjunctiva/sclera: Conjunctivae normal.      Pupils: Pupils are equal, round, and reactive to light.   Neck:      Musculoskeletal: Normal range of motion and neck supple. No muscular tenderness.      Thyroid: No thyromegaly.   Cardiovascular:      Rate and Rhythm: Normal rate and regular rhythm.      Pulses: Normal pulses.      Heart sounds: Normal heart sounds. No murmur. No friction rub. No gallop.    Pulmonary:      " Effort: Pulmonary effort is normal. No respiratory distress.      Breath sounds: Normal breath sounds. No wheezing.      Comments: Pain with deep inspiration  Abdominal:      General: Bowel sounds are normal. There is no distension.      Palpations: Abdomen is soft.      Tenderness: There is no abdominal tenderness.   Musculoskeletal:         General: Tenderness present.      Comments: Severe back pain. Right side limitation from cerebral palsy.    Lymphadenopathy:      Head:      Right side of head: No submental, submandibular, tonsillar, preauricular, posterior auricular or occipital adenopathy.      Left side of head: No submental, submandibular, tonsillar, preauricular, posterior auricular or occipital adenopathy.      Cervical: No cervical adenopathy.      Upper Body:      Right upper body: No supraclavicular adenopathy.      Left upper body: No supraclavicular adenopathy.   Skin:     General: Skin is warm and dry.      Coloration: Skin is not pale.      Findings: No erythema or rash.   Neurological:      Mental Status: He is alert and oriented to person, place, and time.   Psychiatric:         Mood and Affect: Mood normal.         Behavior: Behavior normal.          Ct-cspine Without Plus Recons    Result Date: 2/17/2020 2/17/2020 5:26 PM HISTORY/REASON FOR EXAM: right thoracoabd trauma head injury; Trauma Protocol Fall.  Dizziness.  Pain. TECHNIQUE/EXAM DESCRIPTION: CT cervical spine without contrast, with reconstructions. The study was performed on a helical multidetector CT scanner. Thin-section helical scanning was performed from the skull base through T1. Sagittal and coronal multiplanar reconstructions were generated from the axial images. Low dose optimization technique was utilized for this CT exam including automated exposure control and adjustment of the mA and/or kV according to patient size. COMPARISON:  7/27/2018 FINDINGS: Bony fusion at the C2-3 and C3-4 levels with anterior plate and screw  fixation at C3-4.  Bony fusion of the C5-6 level.  Lytic lesion present within the C6 vertebral body, new from prior exam.  Smaller lytic lesion present at the C4 level on the LEFT.  Lytic lesion also noted within the skull base involving LEFT occipital condyle. Lytic lesion at the T1 level. Vertebral body heights are preserved. Loss of disc height and osteophyte formation at the C6-7 level with associated minimal listhesis, unchanged. Facets are normally aligned.  Multilevel facet hypertrophy present. Axial images show no acute fracture. Cervicothoracic junction is intact. Prevertebral soft tissues are within normal limits. Visualized lung apices are unremarkable.     1.  Postoperative changes as described. 2.  Multilevel fusion of cervical spine. 3.  Moderate degenerative disc disease again seen at C6-7, associated minimal retrolisthesis, unchanged. 4.  No acute cervical spine fracture. 5.  Multiple lytic lesions involving the cervical spine, upper thoracic spine, and skull base as described above, consistent with metastases and potentially of orthopedic significance.    Ct-chest,abdomen,pelvis With    Result Date: 2/17/2020 2/17/2020 5:26 PM HISTORY/REASON FOR EXAM: Fall. Chest and back pain. Dizziness. TECHNIQUE/EXAM DESCRIPTION: CT scan of the chest, abdomen and pelvis with contrast. Helical scanning was obtained with intravenous contrast from the lung apices through the pubic symphysis to include the chest, abdomen and pelvis.  100 mL of Omnipaque 350 nonionic contrast was administered intravenously without complication. Low dose optimization technique was utilized for this CT exam including automated exposure control and adjustment of the mA and/or kV according to patient size. COMPARISON: 1/28/2019 FINDINGS: There is a subcentimeter right thyroid nodule. Ascending aorta measures 3.4 cm in diameter. There is coronary artery calcification. No pericardial or pleural effusion is seen. Small amount of fluid is  seen in the pericardial recesses. No mediastinal, hilar or axillary lymphadenopathy is identified. There is mild bibasilar atelectasis. There is a nodule at the left lung base measuring 5 mm on image 98. Central airways are patent. 4.6 cm nodule is seen at the right lung base on image 96. 5.5 mm nodule is seen at the right lung base on image 93. 3.3 mm nodule is seen in the right upper lobe on image 24. 3 mm nodule on image 20 is also seen in the right lung apex. 9 mm pleural-based right upper lobe nodule is seen on image 41. There is a tiny calcified granuloma. No free air is seen in the abdomen or pelvis. The liver appears unremarkable. Gallstones are seen within the gallbladder. Spleen is not enlarged. No adrenal mass is identified. Hypodense left renal lesion likely represents a cyst. There are nonobstructing right renal calculi. There is right pelvocaliectasis with scarring of the right renal parenchyma. Pancreas appears unremarkable. Atherosclerotic plaque is seen in the aorta. The aorta is mildly ectatic. There are small inguinal, retroperitoneal and mesenteric lymph nodes. There is no evidence of bowel obstruction. There are scattered colonic diverticula. There is a moderate amount of colonic stool. Visualized appendix appears unremarkable. There may be wall thickening of the gastric antrum. Bladder is distended. Coarse calcifications are seen in the prostate. There are small fat-containing inguinal hernias. Degenerative changes are seen in the spine. There are multiple lytic bone lesions compatible with osseous metastatic disease. There is permeative appearance of the left ischium. There is a fracture of the lateral seventh rib on the right. There is erosion of the right aspect of the sternum with an associated soft tissue mass. There is mild loss of height of the superior endplate of T11.     Extensive osseous metastatic disease. Fracture of the seventh rib on the right. Pulmonary nodules compatible with  metastatic disease. Right renal pelvocaliectasis suspicious for partial UPJ obstruction with renal cortical scarring and nonobstructing right renal calculi. Moderate amount of colonic stool. Colonic diverticulosis. Cholelithiasis. Atherosclerotic plaque. Mild loss of height of the superior endplate of T11. Wall thickening of the gastric antrum is not excluded. This could be related to gastritis but an underlying mass is not excluded.     Ct-head W/o    Result Date: 2/17/2020 2/17/2020 5:26 PM HISTORY/REASON FOR EXAM:  right thoraco-abd trauma head injury; Trauma Protocol.  Fall.  Dizziness. TECHNIQUE/EXAM DESCRIPTION AND NUMBER OF VIEWS: CT of the head without contrast. The study was performed on a helical multidetector CT scanner. Contiguous 2.5 mm axial sections were obtained from the skull base through the vertex. Up to date radiation dose reduction adjustments have been utilized to meet ALARA standards for radiation dose reduction. COMPARISON:  7/27/2018 FINDINGS: Lateral ventricles are moderately enlarged but symmetric Large Patchy areas of low attenuation in the white matter bilaterally. Focal low-attenuation lesion seen in the LEFT frontal periventricular white matter. Ovoid hypodensity in the LEFT basal ganglia anteriorly, unchanged. No significant mass effect or midline shift. Basal cisterns are patent. No evidence for intracranial hemorrhage. Calvaria are intact. Visualized orbits are unremarkable. Visualized mastoid air cells are clear. Visualized paranasal sinuses are unremarkable.     1.  Diffuse atrophy and white matter changes. 2.  No acute intracranial hemorrhage or territorial infarct. 3.  Chronic LEFT frontal and basal ganglia infarcts.    Ct-lspine W/o Plus Recons    Result Date: 2/17/2020 2/17/2020 5:26 PM HISTORY/REASON FOR EXAM:  right thoracoabd trauma head injury; trauma protocol. Fall, back pain. TECHNIQUE/EXAM DESCRIPTION AND NUMBER OF VIEWS: CT lumbar spine without contrast, with  reconstructions. The study was performed on a helical multidetector CT scanner. Thin-section helical scanning was performed from T12-L1 to the sacrum. Sagittal and coronal multiplanar reconstructions were generated from the axial images. Low dose optimization technique was utilized for this CT exam including automated exposure control and adjustment of the mA and/or kV according to patient size. COMPARISON: None. FINDINGS: Grade 1-2 anterolisthesis L5-S1. Lytic lesion in the LEFT posteriorly at the L4 level. Lytic lesion within the LEFT posterior ilium.  Lytic lesion within RIGHT iliac wing, with cortical breakthrough. Lytic lesions in the lower thoracic spine. Bilateral L5 pars defects. Axial images show no acute vertebral fracture. Gallstones noted. Atrophic RIGHT kidney with nonobstructing stone.     1.  No acute lumbar spine fracture. 2.  Chronic L5 pars defects with associated severe degenerative disease at L5-S1 associated grade 1-2 anterolisthesis. 3.  Multiple lytic lesions in the lower thoracic spine, lumbar spine and pelvis consistent with metastases.    Ct-tspine W/o Plus Recons    Result Date: 2/17/2020 2/17/2020 5:26 PM HISTORY/REASON FOR EXAM:  right thoracoabd trauma head injury; Trauma Protocol. Chest, rib and back pain. TECHNIQUE/EXAM DESCRIPTION AND NUMBER OF VIEWS: CT thoracic spine without contrast, with reconstructions. The study was performed on a [employer].E. CT scanner. Thin-section helical scanning was performed from C7-T1 through T12-L1. Sagittal and coronal multiplanar reconstructions were generated from the axial images. Low dose optimization technique was utilized for this CT exam including automated exposure control and adjustment of the mA and/or kV according to patient size. COMPARISON: CT chest 10/24/2017 FINDINGS: Vertebral alignment is preserved. Vertebral body heights are preserved. Multilevel loss of disc height and osteophyte formation. The facet joints show normal alignment. Axial  images show no acute vertebral fracture. Lytic lesions are seen at multiple levels of the thoracic spine, most extensive at the T11 level where there is a probable cortical breakthrough into the RIGHT paraspinous soft tissues. Visualized lung show multiple small bilateral lower lobe nodules.     1.  No thoracic spine fracture or subluxation. 2.  Multiple lytic lesions throughout the thoracic spine, most apparent at the T11 level where there is cortical breakthrough at the anterolateral aspect, potentially of orthopedic significance. 3.  Bilateral lower lobe pulmonary nodules, likely metastatic.    Dx-chest-portable (1 View)    Result Date: 2/17/2020 2/17/2020 4:05 PM HISTORY/REASON FOR EXAM:  Chest Pain. The. TECHNIQUE/EXAM DESCRIPTION AND NUMBER OF VIEWS: Single portable view of the chest. COMPARISON: 12/29/2016 FINDINGS: Patient is rotated. The cardiomediastinal silhouette is stable. No focal consolidation, pleural effusion or pneumothorax is identified.  Costophrenic angles are clear. There is a nodular opacity in the peripheral right upper lobe. There is a fracture of the seventh lateral rib on the right. This could be pathologic.     Fracture of the lateral seventh rib on the right. This could be a pathologic fracture. Nodular opacity in the peripheral right upper lobe. Further evaluation with CT is recommended.     Mr-brain-with & W/o    Result Date: 1/28/2020 1/28/2020 1:06 PM HISTORY/REASON FOR EXAM:  Duane's syndrome; Diplopia. TECHNIQUE/EXAM DESCRIPTION:   MRI of the brain without and with contrast. T1 sagittal, T2 fast spin-echo axial, T1 coronal, FLAIR coronal, diffusion-weighted and apparent diffusion coefficient (ADC map) axial images were obtained of the whole brain. T1 postcontrast axial and T1 postcontrast coronal images were obtained. The study was performed on a [employer] 1.5 [employer] MRI scanner. 15 mL ProHance contrast was administered intravenously. COMPARISON:  Noncontrast brain MRI 6/7/2018  FINDINGS: Mild generalized volume loss.  Mild scattered small T2 hyperintensities in periventricular and subcortical cerebral white matter and moderate on are nonspecific, most likely chronic microvascular ischemic changes. Old left basal ganglia and right corona radiata lacunar infarcts are again noted. Redemonstrated is a prominent left coronal radiata/centrum semiovale infarct. No acute intracranial hemorrhage, extra-axial fluid collection, mass effect, midline shift or hydrocephalus. No restricted diffusion to suggest acute infarct. Small developmental venous anomaly in the left frontal lobe. No significant abnormal enhancement within or around the brain. Proximal vascular flow voids are patent. Paranasal sinuses and mastoids are clear.  New focal area of abnormal T1 hypointense signal and enhancement in the left occipital condyle measuring about 17 mm. This is most suggestive of an osseous metastasis. Low T1 and T2 signal foci in the bilateral parietal calvaria is similar to prior. Both globes demonstrate prior lens surgery.     1.  New focal abnormal lesion in the left occipital condyle most suggestive of osseous metastasis. Further evaluation is recommended. 2.  Stable chronic ischemic changes as detailed above. 3.  No acute intracranial abnormality. 4.  Mild generalized atrophy.         Assessment/Plan:       1. Abnormal CT of spine  [id number] BASE TO MID-THIGH    IMMUNOGLOBULINS A/G/M SERUM    FREE K&L LT CHAINS, QT, SERUM    SERUM PROTEIN ELECTROPHORESIS    PROSTATE SPECIFIC AG DIAGNOSTIC   2. Neoplasm of unspecified behavior of bone, soft tissue, and skin   [id number] BASE TO MID-THIGH       Plan  1.  Patient with multiple CT images showing osseous metastatic lesions throughout.  He recently fell and did have noted a fracture of his seventh rib.  Patient with chronic back pain issues prior to this.  CT chest, abdomen and pelvis do note some small pulmonary nodules concerning for metastatic  disease, however no significant mass noted on CT. there was some possible gastric wall thickening and an underlying mass could not be excluded.  However based on these findings there is high concern for malignancy and I will proceed with further work-up.    I have asked patient to complete labs including a PSA, and myeloma lab work-up including SPEP, immunoglobulins and serum free light chains.    I have also requested a PET/CT to be completed for further evaluation which is currently scheduled for February 27, 2020.    I will have patient follow-up with me in the clinic to review those results of labs and imaging and to discuss further plan of care at that time.  Patient did verbalize understanding and was in agreement with the plan.      Please note that this dictation was created using voice recognition software. I have made every reasonable attempt to correct obvious errors, but I expect that there are errors of grammar and possibly content that I did not discover before finalizing the note.

## 2020-02-18 NOTE — ED NOTES
Called into room by patient - states that he would like to leave because he has an appointment to get to. Informed that he is not yet discharged. ERP Dr. López notified.

## 2020-02-18 NOTE — ED NOTES
Patient discharged in stable condition per orders. IV access removed - bandage applied. Wristband removed per protocol. Patient verbalized understanding of all discharge instructions. All belongings accounted for. Ambulatory to lobby with slow staggering gait accompanied by ED RN.

## 2020-02-18 NOTE — ED PROVIDER NOTES
ED Provider Note    CHIEF COMPLAINT  Chief Complaint   Patient presents with   • Fall     near syncopy yesterday about 4pm, pain in r shoulder pain, r chest rib and back pain, possible loc, no injuries noted to head   • Dizziness     increased problems with balance over last year, getting worse now       HPI  Alvaro Metcalf Jr. is a 74 y.o. male who presents with fall.  He has been having balance problems for almost a year.  He is never fallen in the fell yesterday against the door and hit him in the collarbone and that is what hurt but then when he tried to get up he had right lower chest flank abdominal area pain he came in today for evaluation.  He did not strike his head he has no headache neck pain back pain no nausea vomiting numbness tingling or weakness no extremity pain all other systems negative    REVIEW OF SYSTEMS  See HPI for further details    PAST MEDICAL HISTORY  Past Medical History:   Diagnosis Date   • Arrhythmia    • Arthritic-like pain    • Arthritis    • CAD (coronary artery disease) 7/20/2012   • Cerebral palsy (HCC)     right side affected   • Chronic neck pain 7/20/2012   • Cold feeling     skin chills, always cold   • Dyspnea 7/20/2012   • Elevated cholesterol    • Fatigue 7/20/2012   • Hyperlipidemia 7/20/2012   • Renal disorder     stones       FAMILY HISTORY  Family History   Problem Relation Age of Onset   • Heart Attack Father    • Cancer Mother         intestinal cancer       SOCIAL HISTORY  Social History     Socioeconomic History   • Marital status:      Spouse name: Not on file   • Number of children: Not on file   • Years of education: Not on file   • Highest education level: Not on file   Occupational History   • Not on file   Social Needs   • Financial resource strain: Not on file   • Food insecurity     Worry: Not on file     Inability: Not on file   • Transportation needs     Medical: Not on file     Non-medical: Not on file   Tobacco Use   • Smoking status:  Current Every Day Smoker     Packs/day: 0.75     Years: 54.00     Pack years: 40.50     Types: Cigarettes   • Smokeless tobacco: Never Used   • Tobacco comment: 1/2 pk - 3/4 of a pack a day for 45 yrs   Substance and Sexual Activity   • Alcohol use: Yes     Alcohol/week: 0.0 oz     Comment: Rarely   • Drug use: No   • Sexual activity: Never   Lifestyle   • Physical activity     Days per week: Not on file     Minutes per session: Not on file   • Stress: Not on file   Relationships   • Social connections     Talks on phone: Not on file     Gets together: Not on file     Attends Sabianism service: Not on file     Active member of club or organization: Not on file     Attends meetings of clubs or organizations: Not on file     Relationship status: Not on file   • Intimate partner violence     Fear of current or ex partner: Not on file     Emotionally abused: Not on file     Physically abused: Not on file     Forced sexual activity: Not on file   Other Topics Concern   • Not on file   Social History Narrative   • Not on file       SURGICAL HISTORY  Past Surgical History:   Procedure Laterality Date   • PB INJ DX/THER AGNT PARAVERT FACET JOINT, CE* Right 2/25/2016    Procedure: INJ PARA FACET CT/ 1 LVL W/IG - C2, 3, 4, 5;  Surgeon: Handy Reynoso M.D.;  Location: SURGERY [facility] ORS;  Service: Pain Management   • PB INJ DX/THER AGNT PARAVERT FACET JOINT, CE*  2/25/2016    Procedure: INJ PARA FACET C/T 2D LVL W/IG ;  Surgeon: Handy Reynoso M.D.;  Location: SURGERY [facility] ORS;  Service: Pain Management   • PB INJ DX/THER AGNT PARAVERT FACET JOINT, CE*  2/25/2016    Procedure: INJ PARA FACET C/T 3D LVL W/IG;  Surgeon: Handy Reynoso M.D.;  Location: SURGERY [facility] ORS;  Service: Pain Management   • CERVICAL DISK AND FUSION ANTERIOR  5/12/2010    Performed by VASHTI FULLER at [facility]   • HAND SURGERY  1996    left hand   • CERVICAL DISK AND FUSION ANTERIOR  1990   • CATARACT  "EXTRACTION WITH IOL Bilateral     one eye  10 years ago and the other eye 20 years   • TONSILLECTOMY         CURRENT MEDICATIONS  Home Medications     Reviewed by Siena Taylor R.N. (Registered Nurse) on 02/17/20 at 1558  Med List Status: Partial   Medication Last Dose Status   amitriptyline (ELAVIL) 50 MG Tab  Active   aspirin (ASA) 325 MG TABS  Active   atorvastatin (LIPITOR) 40 MG Tab  Active   Fish Oil-Krill Oil (OMEGA-3 DUAL SPECTRUM PO)  Active   lorazepam (ATIVAN) 1 MG TABS  Active   lysine 500 MG Tab  Active   meloxicam (MOBIC) 15 MG tablet  Active   methocarbamol (ROBAXIN) 750 MG TABS  Active   Misc Natural Products (GLUCOSAMINE CHOND MSM FORMULA PO)  Active   Multiple Vitamins-Minerals (CENTRUM SILVER PO)  Active   pregabalin (LYRICA) 75 MG Cap  Active   zolpidem (AMBIEN) 10 MG Tab  Active                ALLERGIES  Allergies   Allergen Reactions   • Tetanus Toxoid Anaphylaxis   • Crestor [Rosuvastatin]      Gi upset and myalgias       PHYSICAL EXAM  VITAL SIGNS: /75   Pulse 63   Temp 36.4 °C (97.5 °F) (Temporal)   Resp 13   Ht 1.88 m (6' 2\")   Wt 67.7 kg (149 lb 4 oz)   SpO2 96%   BMI 19.16 kg/m²     Constitutional: Patient is alert and oriented x3 in no distress unless moving  HENT: Atraumatic  Eyes:   Atraumatic  Neck: Nontender cervical spine trach is midline  Cardiovascular: Normal heart rate    Thorax & Lungs: Clear to auscultation tender to palpation of the right lateral lower chest wall  Back: Nontender lumbar thoracic spine  Abdomen: Mild right upper quadrant tenderness  Neurologic: Normal motor sensation cranial nerves grossly intact chronic abducens palsy  Extremities: Full range of motion  Psychiatric: Affect normal, Judgment normal, Mood normal.     Results for orders placed or performed during the hospital encounter of 02/17/20   CBC with Differential   Result Value Ref Range    WBC 7.5 4.8 - 10.8 K/uL    RBC 4.89 4.70 - 6.10 M/uL    Hemoglobin 14.8 14.0 - 18.0 g/dL    " Hematocrit 45.3 42.0 - 52.0 %    MCV 92.6 81.4 - 97.8 fL    MCH 30.3 27.0 - 33.0 pg    MCHC 32.7 (L) 33.7 - 35.3 g/dL    RDW 43.9 35.9 - 50.0 fL    Platelet Count 164 164 - 446 K/uL    MPV 10.6 9.0 - 12.9 fL    Neutrophils-Polys 73.30 (H) 44.00 - 72.00 %    Lymphocytes 15.90 (L) 22.00 - 41.00 %    Monocytes 7.80 0.00 - 13.40 %    Eosinophils 2.00 0.00 - 6.90 %    Basophils 0.50 0.00 - 1.80 %    Immature Granulocytes 0.50 0.00 - 0.90 %    Nucleated RBC 0.00 /100 WBC    Neutrophils (Absolute) 5.48 1.82 - 7.42 K/uL    Lymphs (Absolute) 1.19 1.00 - 4.80 K/uL    Monos (Absolute) 0.58 0.00 - 0.85 K/uL    Eos (Absolute) 0.15 0.00 - 0.51 K/uL    Baso (Absolute) 0.04 0.00 - 0.12 K/uL    Immature Granulocytes (abs) 0.04 0.00 - 0.11 K/uL    NRBC (Absolute) 0.00 K/uL   Complete Metabolic Panel (CMP)   Result Value Ref Range    Sodium 141 135 - 145 mmol/L    Potassium 4.0 3.6 - 5.5 mmol/L    Chloride 104 96 - 112 mmol/L    Co2 29 20 - 33 mmol/L    Anion Gap 8.0 0.0 - 11.9    Glucose 217 (H) 65 - 99 mg/dL    Bun 26 (H) 8 - 22 mg/dL    Creatinine 1.12 0.50 - 1.40 mg/dL    Calcium 9.4 8.5 - 10.5 mg/dL    AST(SGOT) 48 (H) 12 - 45 U/L    ALT(SGPT) 51 (H) 2 - 50 U/L    Alkaline Phosphatase 185 (H) 30 - 99 U/L    Total Bilirubin 0.5 0.1 - 1.5 mg/dL    Albumin 4.1 3.2 - 4.9 g/dL    Total Protein 7.2 6.0 - 8.2 g/dL    Globulin 3.1 1.9 - 3.5 g/dL    A-G Ratio 1.3 g/dL   Troponin   Result Value Ref Range    Troponin T 13 6 - 19 ng/L   ESTIMATED GFR   Result Value Ref Range    GFR If African American >60 >60 mL/min/1.73 m 2    GFR If Non African American >60 >60 mL/min/1.73 m 2   EKG (NOW)   Result Value Ref Range    Report       [facility] Emergency Dept.    Test Date:  2020  Pt Name:    AYDEE HART               Department: ER  MRN:        [id number]                      Room:  Gender:     Male                         Technician: [id number]  :        1945                   Requested By:ER TRIAGE  PROTOCOL  Order #:    [id number]                    Reading MD:    Measurements  Intervals                                Axis  Rate:       77                           P:          60  PA:         200                          QRS:        71  QRSD:       108                          T:          80  QT:         392  QTc:        444    Interpretive Statements  SINUS RHYTHM  BORDERLINE INTRAVENTRICULAR CONDUCTION DELAY  Compared to ECG 05/04/2010 14:36:44  Sinus bradycardia no longer present  First degree AV block no longer present        CT-CHEST,ABDOMEN,PELVIS WITH   Final Result      Extensive osseous metastatic disease.      Fracture of the seventh rib on the right.      Pulmonary nodules compatible with metastatic disease.      Right renal pelvocaliectasis suspicious for partial UPJ obstruction with renal cortical scarring and nonobstructing right renal calculi.      Moderate amount of colonic stool. Colonic diverticulosis.      Cholelithiasis.      Atherosclerotic plaque.      Mild loss of height of the superior endplate of T11.      Wall thickening of the gastric antrum is not excluded. This could be related to gastritis but an underlying mass is not excluded.         CT-TSPINE W/O PLUS RECONS   Final Result      1.  No thoracic spine fracture or subluxation.   2.  Multiple lytic lesions throughout the thoracic spine, most apparent at the T11 level where there is cortical breakthrough at the anterolateral aspect, potentially of orthopedic significance.   3.  Bilateral lower lobe pulmonary nodules, likely metastatic.      CT-LSPINE W/O PLUS RECONS   Final Result      1.  No acute lumbar spine fracture.   2.  Chronic L5 pars defects with associated severe degenerative disease at L5-S1 associated grade 1-2 anterolisthesis.   3.  Multiple lytic lesions in the lower thoracic spine, lumbar spine and pelvis consistent with metastases.      CT-HEAD W/O   Final Result      1.  Diffuse atrophy and white matter changes.   2.   No acute intracranial hemorrhage or territorial infarct.   3.  Chronic LEFT frontal and basal ganglia infarcts.      CT-CSPINE WITHOUT PLUS RECONS   Final Result      1.  Postoperative changes as described.   2.  Multilevel fusion of cervical spine.   3.  Moderate degenerative disc disease again seen at C6-7, associated minimal retrolisthesis, unchanged.   4.  No acute cervical spine fracture.   5.  Multiple lytic lesions involving the cervical spine, upper thoracic spine, and skull base as described above, consistent with metastases and potentially of orthopedic significance.      DX-CHEST-PORTABLE (1 VIEW)   Final Result      Fracture of the lateral seventh rib on the right. This could be a pathologic fracture.      Nodular opacity in the peripheral right upper lobe. Further evaluation with CT is recommended.                 COURSE & MEDICAL DECISION MAKING  Pertinent Labs & Imaging studies reviewed. (See chart for details)  The patient's lab work showed non-concerning abnormalities.  A CT scan shows multiple areas of bony metastasis in the spine and rib cage.  There is a possible pathologic fracture of 1 rib on the right.  I did inform the patient that turns out there is a lump on his scalp and it sounds like he has been referred for cancer evaluation and has appointment tomorrow he will follow-up there.  He does not require any pain medications he is given return precautions and follow-up    FINAL IMPRESSION  1.  Fall  2.  Rib fracture  3.  Bony metastasis         Electronically signed by: Esteban López M.D., 2/17/2020 4:34 PM

## 2020-02-19 NOTE — PROGRESS NOTES
Outcome: Left voicemail/ message    Please transfer to [facility]  [phone] when patient returns call.     [facility] Verified: yes     Attempt # 1

## 2020-02-21 NOTE — TELEPHONE ENCOUNTER
"Pt called with c/o increased pain to right side of back, 10/10 stabbing pain.  Pt states he recently had a fall on 2/17 & fell on his ride side.  Pt found to have rib fracture upon evaluation in the ED post fall.  Pt states that the pain to his right side has progressively worsened since his fall & reports decreased balance.  States he uses a walking device for stability with ambulation but pt could not tell me whether it was a walker or cane.  Pt states his main purpose for calling was to find out what he could use topically for pain control vs oral meds.  States he has Aspercreme but cannot apply it by himself to the area on his back that is causing him pain/discomfort & lives alone with no available resources to help him apply the Aspecreme to his back.  Pt verbalized he wanted to treat himself to a movie to see \"Call of the Wild\" since he has been feeling so horrible but wanted recommendation for topical pain relief so he could enjoy his movie.    Spoke with Orly FARRAR & rec'vd verbal orders to instruct pt to apply hot or cold packs to area, use Tylenol and/or IB, keep self warm & purchase Aspercreme spray for self application of product.  CHARAN also recommended that pt return to the ED if his pain is not relieved.      Called pt & informed him of above instructions from CHARAN.  Pt verbalized he is already taking Aleve, so he was advised to not use IB at the same time, just Tylenol.  Pt stated he would p/u Aspercreme spray & start using a hot pack.  Informed pt that his current bruising may begin to appear visual worse from heat application.  Encouraged pt to seek medical attention at ED if pain is not resolved by one of the above recommendations.  Pt verbalized understanding & said he would check into the ED after his movie if needed.  "

## 2020-02-23 NOTE — ED NOTES
Alvaro Metcalf Jr. patient has chosen to leave the hospital against medical advice. The attending physician has not discharged the patient. Patient is not a risk to himself or others. I have discussed with the patient the following:  Physician has not determined patient is ready for discharge, Risks and consequences of leaving the hospital too soon and Benefit of continued hospitalization.      Discharge against medical advice form has been Signed.

## 2020-02-23 NOTE — ED PROVIDER NOTES
ED Provider Note    CHIEF COMPLAINT  Chief Complaint   Patient presents with   • T-5000 GLF     Pt states that appx 5 days ago (Wednesday) he had a mechanical ground level fall injuring his Right collar bone, flank and back. Pt was evaluated on Thursday, and diagnosed with a couple of fractures. Pt states he has been using a roll on lidocaine treatment and meloxicam for pain. Pt states that the pain is unbearable and he is requesting help.    • Chest Wall Pain        HPI    Primary care provider: Chris Hinkle M.D.   History obtained from: Patient  History limited by: None     Alvaro Metcalf Jr. is a 74 y.o. male who presents to the ED complaining of continued right upper chest pain around his collarbone as well as right lower chest pain and also pain across the left upper chest.  Patient had a fall 5 days ago and was seen in this ED February 17, 2020 where imaging studies were performed including CT head, cervical spine, chest/abdomen/pelvis, thoracic/lumbar spine.  He was found to have right lateral seventh rib fracture as well as multiple lytic lesions involving cervical spine, thoracic spine, lumbar spine, pelvis and skull base consistent with metastasis.  Patient also with suspicion for partial UPJ obstruction.  He was also found to have cholelithiasis.  He also had multiple pulmonary nodules compatible with metastatic disease.  Patient reports that he has been using topical lidocaine and meloxicam with slight improvement of his pain but is seeking better pain control.  He denies fever/chills/shortness of breath or difficulty breathing/nausea/vomiting/diarrhea/dysuria.  He reports chronic right-sided neck pain for 30 years.  He also reports chronic constipation which is unchanged.    REVIEW OF SYSTEMS  Please see HPI for pertinent positives/negatives.  All other systems reviewed and are negative.     PAST MEDICAL HISTORY  Past Medical History:   Diagnosis Date   • CAD (coronary artery disease)  7/20/2012   • Hyperlipidemia 7/20/2012   • Chronic neck pain 7/20/2012   • Fatigue 7/20/2012   • Dyspnea 7/20/2012   • Arrhythmia    • Arthritic-like pain    • Arthritis    • Cerebral palsy (HCC)     right side affected   • Cold feeling     skin chills, always cold   • Elevated cholesterol    • Renal disorder     stones        SURGICAL HISTORY  Past Surgical History:   Procedure Laterality Date   • PB INJ DX/THER AGNT PARAVERT FACET JOINT, CE* Right 2/25/2016    Procedure: INJ PARA FACET CT/ 1 LVL W/IG - C2, 3, 4, 5;  Surgeon: Handy Reynoso M.D.;  Location: SURGERY [facility];  Service: Pain Management   • PB INJ DX/THER AGNT PARAVERT FACET JOINT, CE*  2/25/2016    Procedure: INJ PARA FACET C/T 2D LVL W/IG ;  Surgeon: Handy Reynoso M.D.;  Location: SURGERY [facility];  Service: Pain Management   • PB INJ DX/THER AGNT PARAVERT FACET JOINT, CE*  2/25/2016    Procedure: INJ PARA FACET C/T 3D LVL W/IG;  Surgeon: Handy Reynoso M.D.;  Location: SURGERY [facility];  Service: Pain Management   • CERVICAL DISK AND FUSION ANTERIOR  5/12/2010    Performed by VASHTI FULLER at SURGERY [facility]   • HAND SURGERY  1996    left hand   • CERVICAL DISK AND FUSION ANTERIOR  1990   • CATARACT EXTRACTION WITH IOL Bilateral     one eye  10 years ago and the other eye 20 years   • TONSILLECTOMY          SOCIAL HISTORY  Social History     Tobacco Use   • Smoking status: Current Every Day Smoker     Packs/day: 0.75     Years: 54.00     Pack years: 40.50     Types: Cigarettes   • Smokeless tobacco: Never Used   • Tobacco comment: 1/2 pk - 3/4 of a pack a day for 45 yrs   Substance and Sexual Activity   • Alcohol use: Yes     Alcohol/week: 0.0 oz     Frequency: Monthly or less     Comment: wine couple times per month   • Drug use: No   • Sexual activity: Never        FAMILY HISTORY  Family History   Problem Relation Age of Onset   • Heart Attack Father    • Cancer Mother         intestinal cancer         CURRENT MEDICATIONS  Home Medications    **Home medications have not yet been reviewed for this encounter**          ALLERGIES  Allergies   Allergen Reactions   • Tetanus Toxoid Anaphylaxis        PHYSICAL EXAM  VITAL SIGNS: /75   Pulse 89   Temp 36.7 °C (98.1 °F) (Temporal)   Resp 16   Wt 65.6 kg (144 lb 10 oz)   SpO2 95%   BMI 18.56 kg/m²  @ASHLEY[[id number]::@     Pulse ox interpretation: 95% I interpret this pulse ox as normal     Constitutional: Thin patient, alert in no apparent distress, nontoxic appearance    HENT: No external signs of trauma, normocephalic  Eyes: PERRL, conjunctiva without erythema, no discharge, no icterus    Neck: Soft and supple, trachea midline, no stridor, no midline tenderness, mild right-sided paraspinal tenderness to palpation, no LAD, no JVD, good ROM without apparent restriction or discomfort  Cardiovascular: Regular rate and rhythm, no murmurs/rubs/gallops, strong distal pulses and good perfusion    Thorax & Lungs: No respiratory distress, CTAB, mild bruise on right upper chest anteriorly below the clavicle with tenderness to palpation without crepitus/warmth/streaking/drainage  Abdomen: Soft, nontender, nondistended, no guarding, no rebound, normal BS    Back: No CVAT    Extremities: No cyanosis, no edema, no gross deformity, good ROM, no tenderness, intact distal pulses with brisk cap refill    Skin: Warm, dry, no pallor/cyanosis, no rash noted    Lymphatic: No lymphadenopathy noted    Neuro: A/O times 3, no focal deficits noted, ambulating without difficulty  Psychiatric: Cooperative, slightly upset, no signs of hallucinations or delusions, normal judgement      DIAGNOSTIC STUDIES / PROCEDURES    EKG  12 Lead EKG obtained at 1919 and interpreted by me:   Rate: 80   Rhythm: Sinus rhythm   Ectopy: None  Intervals: First-degree block  Axis: Normal   QRS: Normal   ST segments: Minimal ST depression lateral leads  T Waves: T wave inversion aVL    Clinical Impression: Sinus  rhythm with first-degree block and nonspecific ST-T wave changes  Compared to 2020 with similar appearance      LABS  All labs reviewed by me.     Results for orders placed or performed during the hospital encounter of 20   EKG   Result Value Ref Range    Report       [facility] Emergency Dept.    Test Date:  2020  Pt Name:    AYDEE HART               Department: ER  MRN:        [id number]                      Room:  Gender:     Male                         Technician: [id number]  :        1945                   Requested By:ER TRIAGE PROTOCOL  Order #:    [id number]                    Reading MD:    Measurements  Intervals                                Axis  Rate:       80                           P:          74  MA:         220                          QRS:        69  QRSD:       108                          T:          86  QT:         380  QTc:        439    Interpretive Statements  SINUS RHYTHM  FIRST DEGREE AV BLOCK  BORDERLINE INTRAVENTRICULAR CONDUCTION DELAY  Compared to ECG 2020 13:36:57  First degree AV block now present          RADIOLOGY  The radiologist's interpretation of all radiological studies have been reviewed by me.     No orders to display          COURSE & MEDICAL DECISION MAKING  Nursing notes, VS, PMSFHx reviewed in chart.     Review of past medical records shows the patient was seen in this ED 2020 due to injuries from his fall.      Differential diagnoses considered include but are not limited to: Fx, contusion, strain, sprain, pneumothorax, hemothorax      History and physical exam as above.  EKG performed by triage without evidence for significant change compared to prior.  By the time I saw the patient, he was requesting to sign out AMA.  He is noted to be in no acute distress and nontoxic in appearance.  He is aware of the risks of signing out AMA.  He is not exhibiting any signs of hallucinations or delusions.  He  was encouraged to return to the ED if he changes his mind or if he has any concerns.  Otherwise, he was advised on outpatient follow-up.  Patient verbalized understanding and agreed with plan of care with no further questions or concerns.      The patient is leaving against medical advice.  I discussed with the patient the risks of leaving without receiving appropriate care to include permanent disability or death.  I have discussed possible alternatives.  The patient is not intoxicated clinically and has the capacity to understand the risks of his decision.  While I disagree with the patient's decision, I respect the patient's autonomy and will not keep him here against his will.  The patient was given discharge instructions and a followup plan as documented in the medical record.  I have advised the patient that he can return to the ED at any time.        The patient is referred to a primary physician for blood pressure management, diabetic screening, and for all other preventative health concerns.       FINAL IMPRESSION  1. Right-sided chest pain Active          DISPOSITION  Patient signed out AMA      FOLLOW UP  Chris Hinkle M.D.  [address] [place] [address] Dr IVY HANNAH [id number] [phone]    Call in 2 days      [facility], Emergency Dept  [address] [place] [id number] [phone]    If symptoms worsen         OUTPATIENT MEDICATIONS  Discharge Medication List as of 2/22/2020  9:42 PM             Electronically signed by: Thai Cruz D.O., 2/22/2020 9:21 PM      Portions of this record were made with voice recognition software.  Despite my review, spelling/grammar/context errors may still remain.  Interpretation of this chart should be taken in this context.

## 2020-02-23 NOTE — ED TRIAGE NOTES
Alvaro Metcalf Jr.  Chief Complaint   Patient presents with   • T-5000 GLF     Pt states that appx 5 days ago (Wednesday) he had a mechanical ground level fall injuring his Right collar bone, flank and back. Pt was evaluated on Thursday, and diagnosed with a couple of fractures. Pt states he has been using a roll on lidocaine treatment and meloxicam for pain. Pt states that the pain is unbearable and he is requesting help.    • Chest Wall Pain     Pt wheeled to triage with above complaint.     /75   Pulse 89   Temp 36.7 °C (98.1 °F) (Temporal)   Resp 16   Wt 65.6 kg (144 lb 10 oz)   SpO2 95%   BMI 18.56 kg/m²     Pt informed of triage process and encouraged to notify staff of any changes or concerns. Pt verbalized understanding of instructions. Apologized for long wait time. [facility]

## 2020-02-23 NOTE — ED NOTES
Agree with triage note. Patient wheeled to room and placed in gown. Educated about the ER process.

## 2020-02-24 NOTE — PROGRESS NOTES
Outcome: Left voicemail/ message    Please transfer to [facility]  [phone] when patient returns call.     [facility] Verified: yes     Attempt # [phone]

## 2020-02-24 NOTE — TELEPHONE ENCOUNTER
ESTABLISHED PATIENT PRE-VISIT PLANNING     Patient was NOT contacted to complete PVP.     Note: Patient will not be contacted if there is no indication to call.     1.  Reviewed notes from the last few office visits within the medical group: Yes    2.  If any orders were placed at last visit or intended to be done for this visit (i.e. 6 mos follow-up), do we have Results/Consult Notes?        •  Labs - Labs ordered, completed on 01/16/2020 and results are in chart.   Note: If patient appointment is for lab review and patient did not complete labs, check with provider if OK to reschedule patient until labs completed.       •  Imaging - Imaging ordered, completed and results are in chart.       •  Referrals - Referral ordered, patient has NOT been seen. Pt seen by [facility] 02/18/2020. Progress notes are in Epic.     3. Is this appointment scheduled as a Hospital Follow-Up? Yes, visit was at [facility].     4.  Immunizations were updated in Epic using WebIZ?: Epic matches WebIZ       •  Web Iz Recommendations: SHINGRIX (Shingles)    5.  Patient is due for the following Health Maintenance Topics:   Health Maintenance Due   Topic Date Due   • IMM ZOSTER VACCINES (1 of 2) 04/12/1994       6. Orders for overdue Health Maintenance topics pended in Pre-Charting? N\A    7.  AHA (MDX) form printed for Provider? YES    8.  Patient was NOT informed to arrive 15 min prior to their scheduled appointment and bring in their medication bottles.

## 2020-02-24 NOTE — PROGRESS NOTES
Pt called back and declined to schedule a HF. Stated he will wait to have his imaging apt and follow up with Oncology.

## 2020-02-25 PROBLEM — G60.3 IDIOPATHIC PROGRESSIVE NEUROPATHY: Status: ACTIVE | Noted: 2020-01-01

## 2020-02-25 PROBLEM — C79.51 BONE METASTASES: Status: ACTIVE | Noted: 2020-01-01

## 2020-02-25 PROBLEM — C80.1 MALIGNANT NEOPLASM METASTATIC TO BONE OF SKULL WITH UNKNOWN PRIMARY SITE (HCC): Status: ACTIVE | Noted: 2020-01-01

## 2020-02-25 PROBLEM — I70.0 ATHEROSCLEROSIS OF ABDOMINAL AORTA (HCC): Status: ACTIVE | Noted: 2020-01-01

## 2020-02-25 PROBLEM — C79.51 MALIGNANT NEOPLASM METASTATIC TO BONE OF SKULL WITH UNKNOWN PRIMARY SITE (HCC): Status: ACTIVE | Noted: 2020-01-01

## 2020-02-25 PROBLEM — C78.01 BILATERAL PULMONARY METASTASES: Status: ACTIVE | Noted: 2020-01-01

## 2020-02-25 PROBLEM — R07.9 CHEST PAIN, EXERTIONAL: Status: RESOLVED | Noted: 2019-01-01 | Resolved: 2020-01-01

## 2020-02-25 PROBLEM — R74.8 ELEVATED LIVER ENZYMES: Status: ACTIVE | Noted: 2020-01-01

## 2020-02-25 PROBLEM — G62.9 PERIPHERAL POLYNEUROPATHY: Status: RESOLVED | Noted: 2020-01-01 | Resolved: 2020-01-01

## 2020-02-25 PROBLEM — C80.1 CANCER WITH UNKNOWN PRIMARY SITE (HCC): Status: ACTIVE | Noted: 2020-01-01

## 2020-02-25 PROBLEM — F02.80: Status: ACTIVE | Noted: 2020-01-01

## 2020-02-25 PROBLEM — K57.90 DIVERTICULOSIS: Status: ACTIVE | Noted: 2020-01-01

## 2020-02-25 PROBLEM — C78.02 BILATERAL PULMONARY METASTASES: Status: ACTIVE | Noted: 2020-01-01

## 2020-02-25 PROBLEM — G62.9 PERIPHERAL POLYNEUROPATHY: Status: ACTIVE | Noted: 2020-01-01

## 2020-02-25 NOTE — PROGRESS NOTES
Annual Health Assessment Questions:        1.  Are you currently engaging in any exercise or physical activity? Yes    2.  How would you describe your mood or emotional well-being today? good    3.  Have you had any falls in the last year? Yes    4.  Have you noticed any problems with your balance or had difficulty walking? Yes    5.  In the last six months have you experienced any leakage of urine? No    6. DPA/Advanced Directive: Patient has Advanced Directive on file.

## 2020-02-25 NOTE — PROGRESS NOTES
Subjective:      Alvaro Metcalf Jr. is a 74 y.o. male who presents with Hyperlipidemia        HPI    The patient is here for follow up of chronic medical problems listed below. The patient is compliant with medications and having no side effects from them. Denies chest pain, abdominal pain, dyspnea, myalgias, or cough.    1. Cancer with unknown primary site (HCC)- METS ON CT 2020 AND MRI BRAIN 2020- PSA, SPEP PEND  2. Bone metastases (HCC)- CT 2020  3. 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 Bilateral pulmonary metastases (HCC)  New diagnoses. Patient seen by CHARAN Duvall (Oncology) 2/18/20 and per her note:    Patient had been experiencing double vision and was seen by his ophthalmologist. They were evaluating his double vision and underwent an MRI of the brain on January 28, 2020. MRI of the brain showed a new focal abnormal lesion in the left occipital condyle which was suggestive of osseous metastasis. Based on these findings patient was referred to me for further evaluation due to concerning findings for possible malignancy. From the time patient was referred to me and time being seen patient ended up having a fall at home and was seen in the emergency department for further evaluation due to significant pain. It was at that time in the emergency department that patient underwent further imaging including multiple CT scans.     On February 17, 2020 patient had a CT spine of the cervical, thoracic and lumbar spine. There were multiple lytic lesions involving the cervical spine, upper thoracic spine and school consistent with metastasis. There was also noted to have multiple lytic lesions in the upper and lower thoracic spine, lumbar spine and pelvis, and CT chest, abdomen and pelvis also completed confirmed extensive osseous metastatic disease throughout. Patient did have a fracture of the seventh rib on the right likely related to his recent fall.  There is some small pulmonary nodules noted which was consistent with metastatic disease. Also noted some wall thickening of the gastric antrum which could be related to some gastritis or an underlying mass could not be excluded.     Patient is a current smoker. He has smoked for approximately 54 years an average of three-quarter pack per day. Patient does have a history of cancer in his mother who had intestinal cancer.    Jessica ordered labs for the patient including PSA, myeloma lab work-up including SPEP, immunoglobulins, and serum free light chains. She additionally ordered PET which he has not yet completed. He has follow up with Jessica scheduled 3/2/20.    5. Idiopathic progressive neuropathy  6. Peripheral neuritis (HCC)- left leg  Known history, reviewed. Patient does not report any new concerns or changes pertaining to this symptom.    7. Empty sella syndrome (HCC)  8. Other cerebral palsy (HCC)  9. Dementia due to another general medical condition, without behavioral disturbance (HCC)  Known history, reviewed. Patient does not report any new concerns or changes pertaining to this symptom.    10. Mixed hyperlipidemia  Chronic. Patient reports compliancy with atorvastatin 40 mg QN and denies any associated side effects. Today we reviewed blood work from 1/16/20 which indicated normal lipid panel with tot 108; tri 106; HDL 47; LDL 40. He denies any chest pain.    11. Anxiety  12. Primary insomnia  Chronic. Patient reports compliancy with lorazepam 1 mg BID and zolpidem 10 mg QN PRN and denies any associated side effects. He reports symptoms are well managed on current regimen. His anxiety has recently worsened due to recent diagnosis (see #1-4).    13. Vitamin D deficiency  Chronic. No current medications. Today we reviewed blood work from 10/11/16 which indicated normal vitamin D level of 41.    14. IFG (impaired fasting glucose)  Chronic. Patient is currently attempting to control through diet and  exercise alone, no current pharmacological treatment. Today we reviewed blood work from 1/16/20 which indicated elevated A1c of 6.3, previously 6.1 on 1/7/20. He denies any polyuria, polydipsia, or hypoglycemic episodes.     15. Atherosclerosis of abdominal aorta (HCC)- CT 2020  Identified on CT 2020. Patient is currently asymptomatic and being treated for hyperlipidemia.     16. Elevated liver enzymes- NORMAL LIVER AND PANCREASE - FEB, CT 2020  Patient had elevated AST (48) and ALT (51) on 2/17/20, previously normal on 1/16/20. He had normal liver and pancrease on CT completed 02/2020. He is currently asymptomatic.    17. Diverticulosis- COLONOSCOPY 2019  Patient was diagnosed with diverticulosis via colonoscopy in 2019. He is currently asymptomatic.    18. Chronic neck pain  19. S/P cervical spinal fusion  20. Cancer associated pain  Patient has chronic pain that is worsened by recent fall and cancer diagnosis. He is not currently using any opiates for these conditions, however does regularly follow at the pain clinic.    Patient Active Problem List   Diagnosis   • Chronic neck pain- S/P cervical spinal fusion- followed by pain mgt- dr white   • Other cerebral palsy (HCC)   • S/P cervical spinal fusion   • Empty sella syndrome (HCC)   • Hypogonadism male- no rx   • Mixed hyperlipidemia   • Primary insomnia   • Spondylosis of cervical region without myelopathy or radiculopathy   • IFG (impaired fasting glucose)   • Vitamin D deficiency   • Smoking greater than 30 pack years (2/3 pack a day for 52 yrs= 34 pk yrs)   • Primary osteoarthritis involving multiple joints   • Tobacco dependence   • Encounter for screening for lung cancer   • Benign essential tremor- ref neuro; trial  beta blockers; r/o parkinsons   • Loss of balance   • Risk for falls   • Anxiety   • Peripheral neuritis (HCC)- left leg   • Wart of scalp   • Dementia due to another general medical condition, without behavioral disturbance (HCC)   •  Idiopathic progressive neuropathy   • Bone metastases (HCC)- CT 2020   • Bilateral pulmonary metastases (HCC)   • Atherosclerosis of abdominal aorta (HCC)- CT 2020   • 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 90 Tab 4   • amitriptyline (ELAVIL) 50 MG Tab Take 1 Tab by mouth every bedtime. 90 Tab 3   • pregabalin (LYRICA) 75 MG Cap Take 1 Cap by mouth 3 times a day. 90 Cap 11   • Fish Oil-Krill Oil (OMEGA-3 DUAL SPECTRUM PO) Take  by mouth.     • lysine 500 MG Tab Take 500 mg by mouth every day.     • Misc Natural Products (GLUCOSAMINE CHOND MSM FORMULA PO) Take  by mouth.     • Multiple Vitamins-Minerals (CENTRUM SILVER PO) Take  by mouth.     • meloxicam (MOBIC) 15 MG tablet Take 15 mg by mouth every day.     • zolpidem (AMBIEN) 10 MG Tab      • methocarbamol (ROBAXIN) 750 MG TABS Take 1 Tab by mouth 3 times a day. 90 Tab 3   • lorazepam (ATIVAN) 1 MG TABS Take 1 Tab by mouth 2 Times a Day. One bid. 60 Tab 3   • aspirin (ASA) 325 MG TABS Take 325 mg by mouth every day.       No facility-administered medications prior to visit.         Allergies   Allergen Reactions   • Tetanus Toxoid Anaphylaxis       Review of Systems   Constitutional: Positive for malaise/fatigue.   HENT: Negative.    Eyes: Positive for double vision.   Respiratory: Negative.    Cardiovascular: Negative.    Gastrointestinal: Negative.    Genitourinary: Negative.    Musculoskeletal:        + bone pain   Skin: Negative.    Neurological: Negative.    Endo/Heme/Allergies: Negative.    Psychiatric/Behavioral:  "Negative.    All other systems reviewed and are negative.       Objective:     /80 (BP Location: Left arm, Patient Position: Sitting, BP Cuff Size: Adult)   Pulse 88   Temp 36.6 °C (97.8 °F) (Temporal)   Resp 16   Ht 1.88 m (6' 2\")   Wt 65.3 kg (144 lb)   SpO2 94%   BMI 18.49 kg/m²     Physical Exam   Constitutional: Oriented to person, place, and time. Appears well-developed and well-nourished. No distress.   Head: Normocephalic and atraumatic.   Right Ear: External ear normal.   Left Ear: External ear normal.   Nose: Nose normal.   Mouth/Throat: Oropharynx is clear and moist. No oropharyngeal exudate.   Eyes: Pupils are equal, round, and reactive to light. Conjunctivae and EOM are normal. Right eye exhibits no discharge. Left eye exhibits no discharge. No scleral icterus.   Neck: Normal range of motion. Neck supple. No JVD present. No tracheal deviation present. No thyromegaly present.   Cardiovascular: Normal rate, regular rhythm, normal heart sounds and intact distal pulses.  Exam reveals no gallop and no friction rub.    No murmur heard.  Pulmonary/Chest: Effort normal. No stridor. No respiratory distress. No wheezing or rales. No tenderness.   Abdominal: Soft. Bowel sounds are normal. No distension and no mass. There is no tenderness. There is no rebound and no guarding. No hernia.   Musculoskeletal: Normal range of motion No edema or tenderness.   Lymphadenopathy: No cervical adenopathy.   Neurological: Alert and oriented to person, place, and time. Normal reflexes. Normal reflexes. No cranial nerve deficit. Normal muscle tone. Coordination normal.   Skin: Skin is warm and dry. No rash noted. Not diaphoretic. No erythema. No pallor.   Psychiatric: Normal mood and affect. Behavior is normal. Judgment and thought content normal.   Nursing note and vitals reviewed.      Lab Results   Component Value Date/Time    HBA1C 6.3 (H) 01/16/2020 11:29 AM    HBA1C 6.1 (H) 01/07/2020 01:36 PM     Lab Results "   Component Value Date/Time    SODIUM 141 02/17/2020 02:00 PM    POTASSIUM 4.0 02/17/2020 02:00 PM    CHLORIDE 104 02/17/2020 02:00 PM    CO2 29 02/17/2020 02:00 PM    GLUCOSE 217 (H) 02/17/2020 02:00 PM    BUN 26 (H) 02/17/2020 02:00 PM    CREATININE 1.12 02/17/2020 02:00 PM    CREATININE 1.17 03/07/2011 12:00 AM    BUNCREATRAT 19 03/07/2011 12:00 AM    GLOMRATE >59 03/07/2011 12:00 AM    ALKPHOSPHAT 185 (H) 02/17/2020 02:00 PM    ASTSGOT 48 (H) 02/17/2020 02:00 PM    ALTSGPT 51 (H) 02/17/2020 02:00 PM    TBILIRUBIN 0.5 02/17/2020 02:00 PM     Lab Results   Component Value Date/Time    INR 1.02 05/04/2010 02:28 PM    INR 1.06 11/25/2009 05:45 AM     Lab Results   Component Value Date/Time    CHOLSTRLTOT 108 01/16/2020 11:29 AM    LDL 40 01/16/2020 11:29 AM    HDL 47 01/16/2020 11:29 AM    TRIGLYCERIDE 106 01/16/2020 11:29 AM       Lab Results   Component Value Date/Time    TESTOSTERONE 285 (L) 03/05/2014 08:34 AM     No results found for: TSH  Lab Results   Component Value Date/Time    FREET4 0.75 01/07/2020 01:36 PM    FREET4 0.64 05/29/2018 02:33 PM     No results found for: URICACID  No components found for: VITB12  Lab Results   Component Value Date/Time    25HYDROXY 41 10/11/2016 11:22 AM    25HYDROXY 36 05/25/2012 09:40 AM          Assessment/Plan:     1. Cancer with unknown primary site (HCC)- METS ON CT 2020 AND MRI BRAIN 2020- PSA, SPEP PEND-this is a new problem.    I suspect that the mets are secondary to metastatic prostate CA by primary bone marrow disorder such as multiple myeloma or macroglobulinemia needs to be ruled out first.  This can be determined by the above testing.  This was ordered by oncology.  We discussed these possibilities in great length with the patient over a 40-minute session.  The patient was quite despondent over the worsening of his pain which explained was secondary to his metastatic disease.    I explained the pain of malignancy should be treated adequately with opiates  although the patient was reluctant to go down this route.    He asked whether or not he should tell his son who is only involved in the medical care industry business, and I advised the patient to find out what the exact diagnosis is first before opening up to his family and he seems to be in agreement with this.  Explained that until we know for sure what were dealing with and how it should be treated, then he and his son can further expand their options in the [place] area where the son lives.        2. Bone metastases (HCC)- CT 2020-see above.  Pain not well controlled.  Start narcotics.  3. 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  4. Bilateral pulmonary metastases (HCC)  - See HPI. Recent diagnosis following imaging.   - Patient was seen by CHARAN Duvall (Oncology) 2/18/20 and has upcoming appointment with her 3/2/20. She has ordered work up including PSA, myeloma lab work-up including SPEP, immunoglobulins, and serum free light chains. She additionally ordered PET which he has not yet completed.  - Due to ongoing, severe pain I have initiated the patient on Norco. I reviewed potential risks, benefits, and side effects of this medication with the patient in clinic today. See below (#20).    5. Idiopathic progressive neuropathy-this is under good control on current regimen.  6. Peripheral neuritis (HCC)- left leg  - Chronic, stable. Continue current regimen.    7. Empty sella syndrome (HCC)-under good control on the current regimen.  No treatment for this other than surveillance.  8. Other cerebral palsy (HCC)-controlled.  9. Dementia due to another general medical condition, without behavioral disturbance (HCC)-this is very minimal and mild and controlled without medication.  - Chronic, stable. Continue current regimen.    10. Mixed hyperlipidemia  - Well-controlled. Continue current regimen, atorvastatin 40 mg QN. I reviewed potential risks,  benefits, and side effects of this medication with the patient in clinic today.  - Advised to eat low fat, low carbohydrate and high fiber diet as well as do cardio physical exercise regularly.   - Plan to continue to monitor with blood work.    11. Anxiety-not well controlled which should respond to lorazepam prescribed for insomnia.  12. Primary insomnia  - Well-controlled. Continue current regimen, lorazepam 1 mg BID and zolpidem 10 mg QN PRN. I reviewed potential risks, benefits, and side effects of this medication with the patient in clinic today.    13. Vitamin D deficiency  - Well-controlled. Continue current regimen.  - Plan to continue to monitor with blood work.    14. IFG (impaired fasting glucose)  - Stable. Continue current regimen of lifestyle modification with balanced diet and regular exercise.   - Advised to eat low fat, low carbohydrate and high fiber diet as well as do cardio physical exercise regularly.   - Plan to continue to monitor with blood work.    15. Atherosclerosis of abdominal aorta (HCC)- CT 2020  - Chronic, stable. Continue current regimen.    16. Elevated liver enzymes- NORMAL LIVER AND PANCREAS - FEB, CT 2020-this may be secondary to his metastatic disease of the bone and lungs.  - Patient had elevated AST (48) and ALT (51) on 2/17/20, previously normal on 1/16/20. He had normal liver and pancrease on CT completed 02/2020. He is currently asymptomatic.    17. Diverticulosis- COLONOSCOPY 2019  - Chronic, stable. Continue current regimen.    18. Chronic neck pain-under good control.  Continue follow-up with pain and spine.  19. S/P cervical spinal fusion-good control.  Continue same regimen.  20. Cancer associated pain  - I have initiated patient on Norco as outlined below. I reviewed proper use and potential risks, benefits, and side effects of this medication with the patient in clinic today.  - HYDROcodone/acetaminophen (NORCO)  MG Tab; Take 1 Tab by mouth every 8 hours as  needed for Severe Pain for up to 30 days. May fill on or after 7/23/2015.  Dispense: 90 Tab; Refill: 0    Other orders  - Consent for Opiate Prescription     IGeeta (Scribe), am scribing for, and in the presence of, Chris Hinkle M.D..    Electronically signed by: Geeta Hector (Scribe), 2/25/2020    I, Chris Hinkle M.D., personally performed the services described in this documentation, as scribed by Geeta Hector in my presence, and it is both accurate and complete.       40 minute face-to-face encounter took place today.  More than half of this time was spent in the coordination of care of the above problems, as well as counseling.

## 2020-02-27 NOTE — TELEPHONE ENCOUNTER
1. Caller Name: Alvaro Metcalf Jr.                        Call Back Number: [phone] (home)         How would the patient prefer to be contacted with a response: Phone call do NOT leave a detailed message    2. What are the patient's symptoms (location & severity)? Itching all over body patient noticed it after taking the Norco.     3. Is this a new symptom Yes    4. When did it start? 02/26/20      5. Action taken per Active Symptom Guide: Urgent Care recommended    6. Patient does not agree to recommended action per Active Symptom Escalation Protocol. Patient was advised again of recommendation and verbalized understanding.         Please advise

## 2020-02-27 NOTE — TELEPHONE ENCOUNTER
Call patient and tell him to stop the Norco since it caused itching all over his body.  Get Benadryl 25 mg over-the-counter and take 1 or 2 pills, 4 times a day as needed for the itching.  For further management of his cancer related pain, he should discuss this further with the oncologist to whom he was referred.     Also please let the patient know that his prostate blood test was normal.  This means that the spots in his lungs and bones seen on his x-rays are probably not coming from his prostate.

## 2020-02-28 NOTE — TELEPHONE ENCOUNTER
Phone Number Called: [phone]    Call outcome: Spoke to patient regarding message below.    Message: Patient has been informed and understands all of the instruction given by Dr. Hinkle

## 2020-03-02 NOTE — PROGRESS NOTES
"03/02/20    Subjective    Chief Complaint:  Bone pain and abnormal PET    HPI:  Patient seen as an IOC consult by Jessica who was unable to see him today. He has multiple lesions in bone and two lesions in liver on PET. PSA normal.myeloma labs normal. Does smoke. We need a tissue diagnosis. The liver lesions are not visible on CT so a bone bx probably the way to go. Discussed with patient.     PMH:    Allergies   Allergen Reactions   • Tetanus Toxoid Anaphylaxis       Medications:    Current Outpatient Medications on File Prior to Visit   Medication Sig Dispense Refill   • atorvastatin (LIPITOR) 40 MG Tab Take 1 Tab by mouth every 48 hours. 90 Tab 4   • amitriptyline (ELAVIL) 50 MG Tab Take 1 Tab by mouth every bedtime. 90 Tab 3   • pregabalin (LYRICA) 75 MG Cap Take 1 Cap by mouth 3 times a day. 90 Cap 11   • Fish Oil-Krill Oil (OMEGA-3 DUAL SPECTRUM PO) Take  by mouth.     • lysine 500 MG Tab Take 500 mg by mouth every day.     • Misc Natural Products (GLUCOSAMINE CHOND MSM FORMULA PO) Take  by mouth.     • Multiple Vitamins-Minerals (CENTRUM SILVER PO) Take  by mouth.     • meloxicam (MOBIC) 15 MG tablet Take 15 mg by mouth every day.     • zolpidem (AMBIEN) 10 MG Tab      • methocarbamol (ROBAXIN) 750 MG TABS Take 1 Tab by mouth 3 times a day. 90 Tab 3   • lorazepam (ATIVAN) 1 MG TABS Take 1 Tab by mouth 2 Times a Day. One bid. 60 Tab 3   • aspirin (ASA) 325 MG TABS Take 325 mg by mouth every day.     • HYDROcodone/acetaminophen (NORCO)  MG Tab Take 1 Tab by mouth every 8 hours as needed for Severe Pain for up to 30 days. May fill on or after 7/23/2015. (Patient not taking: Reported on 3/2/2020) 90 Tab 0     No current facility-administered medications on file prior to visit.          Objective    Vitals:    /78 (BP Location: Left arm, Patient Position: Sitting, BP Cuff Size: Adult)   Pulse 90   Temp 36.6 °C (97.8 °F) (Temporal)   Resp 16   Ht 1.88 m (6' 2\")   Wt 65.2 kg (143 lb 10.1 oz)   " SpO2 96%   BMI 18.44 kg/m²     Physical Exam:    Not examined this visit    Labs:      Assessment    Imp:  :      Multiple bone and liver lesions - most likely lung primary but no mass on CT chest    Plan:    Bone biopsy  RTC 3 -  4 days later  Note lives near [facility] and [address] so probable referral to [facility] would be best    Mikey Lopez M.D.

## 2020-03-03 NOTE — TELEPHONE ENCOUNTER
Deep Bone Biopsy  Date: Monday 03/09/2020  Time: Check in at 11:00 am, biopsy 1:00 pm  Location: [facility] ([address] [facility]), surgery check in  Preparation: Nothing to eat or drink 6 hours prior, you will need a  home, and to call [facility] pre admit at [phone] no later than Thursday 03/05/2020.    Follow up with CHARAN Duvall on Wednesday 3/11/2020 at 2:00 pm

## 2020-03-03 NOTE — TELEPHONE ENCOUNTER
Phone Number Called: [phone]     Call outcome: Did not leave a detailed message. Requested patient to call back.     Message: 2nd attempt regarding biopsy information and follow up visit with our office.

## 2020-03-03 NOTE — TELEPHONE ENCOUNTER
Phone Number Called: [phone]    Call outcome: Did not leave a detailed message. Requested patient to call back.    Message: regarding biopsy information and follow up visit with our office.

## 2020-03-04 NOTE — TELEPHONE ENCOUNTER
I did contact patient's son, Deacon, at the request of the patient.  He did not want to proceed with the biopsy and so I had spoken with his son.  I did give him all the information as to what was going on and did give him the results of the CT scans as well as the PET scan and the concerning findings.  Did inform him that labs have been completed that ruled out prostate and myeloma and we are attempting a bone biopsy scheduled for next Monday, March 9 to confirm diagnosis.    Deacon was appreciative of the phone call and in agreement with the plan.

## 2020-03-04 NOTE — TELEPHONE ENCOUNTER
Patient returned my call. He stated he would like Jessica to call his son to go over all of the diagnosis and plan of care. Patient did not want to take any of the biopsy information from me at this time.     Patient stated his son Deacon has power of  and we may discuss treatment with him.

## 2020-03-04 NOTE — TELEPHONE ENCOUNTER
Phone Number Called: [phone]    Call outcome: Did not leave a detailed message. Requested patient to call back.    Message: Jessica Veronica spoke with patients son and he does agree with the plan of care. Jessica would like to verify with the patient if we can call the son to give him the results prior to seeing patient on 3/12/2020.    Deep Bone Biopsy  Date: Monday 03/09/2020  Time: Check in at 11:00 am, biopsy 1:00 pm  Location: [facility] ([address] [place] [address]), surgery check in  Preparation: Nothing to eat or drink 6 hours prior, you will need a  home, and to call [facility] pre admit at [phone] no later than Thursday 03/05/2020.    Follow up rescheduled to 03/12/2020 at 3:00 pm.

## 2020-03-05 NOTE — TELEPHONE ENCOUNTER
Spoke to patient and he verbalized understanding of the biopsy information and follow up appointment. Transferred patient to [facility] pre-admit to schedule this appointment.

## 2020-03-09 NOTE — OR NURSING
1450 Received report from LYNDA Renteria.     1602 Pt given discharge instructions. Discussed diet, activity, follow-up, symptoms and management, and prescriptions provided. IV d/c'd. All questions answered.      1612 Pt discharged to family via wheelchair with P[facility] CNA. All belongings with pt.

## 2020-03-09 NOTE — PROGRESS NOTES
CT Nursing Note:    Patient consented for Left Posterior Iliac Bone Lesion Boipsy with imaging guidance by Dr. Cabrera, all questions answered. Patient sedated at Dr. Cabrera's direction, see MAR. The pt appeared to tolerate the procedure well.   Gauze and tegaderm applied to left of midline lower back, CDI and soft.  Pt alert and verbally appropriate post procedure, vital signs stable during procedure and transport, see flow sheet for vital signs.  Report given to LYNDA Renteria.  RN transported pt to PPU with Sao2 monitor.

## 2020-03-09 NOTE — OR NURSING
1355: Report received from LYNDA Suarez. Patient to [facility] pending transport.    1408: Patient to [facility]. Left mid back iliac bx site CDI. Q15min vitals and pulse oxymetry in place. Plan of care discussed with patient.    1415: Patient provided with water, denies food at this time. Patient tolerating PO intake. Patient belongings verified to be at bedside. Patient and updated regarding Plan of care.    1450: Report given to LYNDA Persaud. Printed SBAR offered, no further questions at this time.

## 2020-03-09 NOTE — DISCHARGE INSTRUCTIONS
ACTIVITY: Rest and take it easy for the first 24 hours.  A responsible adult is recommended to remain with you during that time.  It is normal to feel sleepy.  We encourage you to not do anything that requires balance, judgment or coordination.    MILD FLU-LIKE SYMPTOMS ARE NORMAL. YOU MAY EXPERIENCE GENERALIZED MUSCLE ACHES, THROAT IRRITATION, HEADACHE AND/OR SOME NAUSEA.    FOR 24 HOURS DO NOT:  Drive, operate machinery or run household appliances.  Drink beer or alcoholic beverages.   Make important decisions or sign legal documents.    SPECIAL INSTRUCTIONS: Okay to shower after 24 hours. Okay to remove dressing after shower.    DIET: To avoid nausea, slowly advance diet as tolerated, avoiding spicy or greasy foods for the first day.  Add more substantial food to your diet according to your physician's instructions.  Babies can be fed formula or breast milk as soon as they are hungry.  INCREASE FLUIDS AND FIBER TO AVOID CONSTIPATION.    SURGICAL DRESSING/BATHING: Do not submerge surgical site for 7 days. No swimming, bathing, hot tubs, etc.    FOLLOW-UP APPOINTMENT:  A follow-up appointment should be arranged with your doctor; call to schedule.    You should CALL YOUR PHYSICIAN if you develop:  Fever greater than 101 degrees F.  Pain not relieved by medication, or persistent nausea or vomiting.  Excessive bleeding (blood soaking through dressing) or unexpected drainage from the wound.  Extreme redness or swelling around the incision site, drainage of pus or foul smelling drainage.  Inability to urinate or empty your bladder within 8 hours.  Problems with breathing or chest pain.    You should call 911 if you develop problems with breathing or chest pain.  If you are unable to contact your doctor or surgical center, you should go to the nearest emergency room or urgent care center.  Dr. Cabrera's telephone #: [phone]    If any questions arise, call your doctor.  If your doctor is not available, please  feel free to call the Surgical Center at [phone].  The Center is open Monday through Friday from 7AM to 7PM.  You can also call the HEALTH HOTLINE open 24 hours/day, 7 days/week and speak to a nurse at [phone], or toll free at [phone].    A registered nurse may call you a few days after your surgery to see how you are doing after your procedure.    MEDICATIONS: Resume taking daily medication.  Take prescribed pain medication with food.  If no medication is prescribed, you may take non-aspirin pain medication if needed.  PAIN MEDICATION CAN BE VERY CONSTIPATING.  Take a stool softener or laxative such as senokot, pericolace, or milk of magnesia if needed.    If your physician has prescribed pain medication that includes Acetaminophen (Tylenol), do not take additional Acetaminophen (Tylenol) while taking the prescribed medication.    Depression / Suicide Risk    As you are discharged from this [facility] facility, it is important to learn how to keep safe from harming yourself.    Recognize the warning signs:  · Abrupt changes in personality, positive or negative- including increase in energy   · Giving away possessions  · Change in eating patterns- significant weight changes-  positive or negative  · Change in sleeping patterns- unable to sleep or sleeping all the time   · Unwillingness or inability to communicate  · Depression  · Unusual sadness, discouragement and loneliness  · Talk of wanting to die  · Neglect of personal appearance   · Rebelliousness- reckless behavior  · Withdrawal from people/activities they love  · Confusion- inability to concentrate     If you or a loved one observes any of these behaviors or has concerns about self-harm, here's what you can do:  · Talk about it- your feelings and reasons for harming yourself  · Remove any means that you might use to hurt yourself (examples: pills, rope, extension cords, firearm)  · Get professional help from the community (Mental  Health, Substance Abuse, psychological counseling)  · Do not be alone:Call your Safe Contact- someone whom you trust who will be there for you.  · Call your local CRISIS HOTLINE 902-9192 or 936-384-9126  · Call your local Children's Mobile Crisis Response Team [place] (963) 944-0422 or www.[employer]  · Call the toll free National Suicide Prevention Hotlines   · National Suicide Prevention Lifeline 080-585-HWDL (9530)  · National Hope Line Network 800-SUICIDE (544-6726)    BONE MARROW ASPIRATION & BIOPSY DISCHARGE INSTRUCTIONS    1. After the numbing medicine wears off, you may feel some discomfort.    2. Keep bandage clean and dry for 24 hours.  After this time, you can change the bandage.  You may now bathe or shower.    3. If bleeding occurs after your bone marrow aspiration or biopsy, apply pressure to the area and call your doctor immediately.    4. Call your doctor if pain persists for greater than 24 hours in the area where you had your aspiration or biopsy.    5. Call your doctor immediately if you notice redness or drainage in the area or if you have a fever.    6. Call your doctor if you have numbness or weakness in the area where the doctor took the bone marrow or down your leg.    7. Do not drive or drink alcohol for 24 hours if you have had sedation medication.  The medication will make you drowsy.    8. Resume your regular diet.    9. Follow up with your doctor.    I acknowledge receipt and understanding of these Home Care Instructions.

## 2020-03-09 NOTE — OR SURGEON
Immediate Post- Operative Note        PostOp Diagnosis: Left iliac bone Bx      Procedure(s): CT guided bx      Estimated Blood Loss: Less than 5 ml        Complications: None            3/9/2020     1:54 PM     Carlos Cabrera M.D.

## 2020-03-11 NOTE — TELEPHONE ENCOUNTER
I did contact patient's son Deacon with regards to the preliminary diagnosis of the biopsy completed on Monday, March 9.  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  However there are still running IHC stains within our pathology department and will be awaiting final pathology soon.  However, based on these results it is confirmed that patient does have metastatic carcinoma and I will be referring patient to oncology.  I am scheduled to meet with patient tomorrow, March 12 to review the results with the patient in the office.    Deacon stated that he will likely be able to be here for the appointment tomorrow as he is attempting to make it work with his current work situation.

## 2020-03-11 NOTE — TELEPHONE ENCOUNTER
Phone Number Called: [phone]    Call outcome: Did not leave a detailed message. Requested patient to call back.    Message: Left message for patient to return call to confirm appointment for tomorrow, to also confirm if patient has any respirtatory illness, and to make them aware of no visitors.

## 2020-03-11 NOTE — PROGRESS NOTES
Subjective:      Alvaro Metcalf Jr. is a 74 y.o. male who presents for biopsy Results.        HPI    Patient seen today in follow-up for bone biopsy results.  Patient is accompanied by his son for today's visit.    Patient was originally referred to [facility] after an MRI brain completed showed a focal abnormal lesion in the left occipital condyle suggestive of an osseous metastasis.  Prior to being seen patient did have a fall and was seen in the emergency department on February 17, 2020.  During the ER visit patient underwent CT scan of the cervical, thoracic and lumbar spine.  CTs did show multiple lytic lesions involving the cervical, thoracic, lumbar and pelvis consistent with metastatic disease.  He also had a CT chest, abdomen and pelvis which confirmed extensive osseous metastatic disease as well.  There is some small pulmonary nodules noted consistent with metastatic disease.  Some wall thickening of the gastric antrum.  He patient was sent for PET/CT which did show extensive bony metastatic disease as well as hypermetabolic liver lesions noted concerning for malignancy as well.  It was decided to proceed with a bone biopsy as the liver lesions were not visible on CT abdomen.    Patient underwent a biopsy of the left posterior iliac bone.  Preliminary pathology report does confirm metastatic carcinoma.  IHC stains showed positive for keratin AE1/AE3, CK7 and RUT-3.  According to the pathologist the morphologic appearance of the metastatic carcinoma with the positive RUT-3 IHC stain is suggestive of metastatic urothelial carcinoma and further stains are pending to confirm urothelial carcinoma.  I did discuss personally with pathology who stated stains are still pending and will likely be available on Monday, March 23.  I personally reviewed the preliminary pathology report with the patient and his son today.      Patient appears to be quite debilitated and extremely weak.  He has fallen multiple times  in the last 2 weeks.  Most recently was last night at 2 AM where he was on the floor for over 4-1/2 hours as he could not get to the phone to call for help.  He finally was able to get to a phone 4-1/2 hours later and did call 911 and according to the son the maintenance crew at the patient's home let the fire department into the home at 6:30 in the morning.  Patient was picked up off the floor and placed on the couch at that time.  Patient stated he did not hit his head however there did appear to be blood noted on the inside of his bottom lip.  Patient is extremely weak and needing at least 1 person assistant to ambulate.  He was able to ambulate 5 steps to the restroom with the assistance of his son today during the appointment.    Allergies   Allergen Reactions   • Tetanus Toxoid Anaphylaxis     Current Outpatient Medications on File Prior to Visit   Medication Sig Dispense Refill   • atorvastatin (LIPITOR) 40 MG Tab Take 1 Tab by mouth every 48 hours. 90 Tab 4   • amitriptyline (ELAVIL) 50 MG Tab Take 1 Tab by mouth every bedtime. 90 Tab 3   • pregabalin (LYRICA) 75 MG Cap Take 1 Cap by mouth 3 times a day. 90 Cap 11   • Fish Oil-Krill Oil (OMEGA-3 DUAL SPECTRUM PO) Take  by mouth.     • lysine 500 MG Tab Take 500 mg by mouth every day.     • Misc Natural Products (GLUCOSAMINE CHOND MSM FORMULA PO) Take  by mouth.     • Multiple Vitamins-Minerals (CENTRUM SILVER PO) Take  by mouth.     • meloxicam (MOBIC) 15 MG tablet Take 15 mg by mouth every day.     • zolpidem (AMBIEN) 10 MG Tab      • methocarbamol (ROBAXIN) 750 MG TABS Take 1 Tab by mouth 3 times a day. 90 Tab 3   • lorazepam (ATIVAN) 1 MG TABS Take 1 Tab by mouth 2 Times a Day. One bid. 60 Tab 3   • aspirin (ASA) 325 MG TABS Take 325 mg by mouth every day.     • HYDROcodone/acetaminophen (NORCO)  MG Tab Take 1 Tab by mouth every 8 hours as needed for Severe Pain for up to 30 days. May fill on or after 7/23/2015. (Patient not taking: Reported on  3/2/2020) 90 Tab 0     No current facility-administered medications on file prior to visit.        Review of Systems   Constitutional: Positive for malaise/fatigue.   Gastrointestinal: Negative for constipation and diarrhea.   Genitourinary: Negative for dysuria.   Musculoskeletal: Positive for back pain and myalgias.   Neurological: Positive for dizziness and weakness. Negative for headaches.        Patient with cerebral palsy with right side mobility issues.          Objective:     /82 (BP Location: Left arm, Patient Position: Sitting, BP Cuff Size: Adult)   Pulse 86   Temp 36.9 °C (98.4 °F) (Temporal)   Resp 14   SpO2 94%      Physical Exam  Vitals signs reviewed.   Cardiovascular:      Rate and Rhythm: Normal rate.   Musculoskeletal:      Comments: Very weak and requires 1 person assist   Neurological:      Mental Status: He is alert and oriented to person, place, and time.              PRELIMINARY DIAGNOSIS:    A. Left posterior iliac bone lesion biopsy:         Core biopsy of fibrous tissue and focal bone demonstrating          involvement by a metastatic carcinoma.         See comment.    Comment: The patient's CT scan of the chest, abdomen and pelvis  demonstrated extensive osseous metastatic disease. There is a permeated appearance of the left ischium. There is a fracture of the lateral 7th rib on the right. There is erosion of the right aspect of the sternum with an associated soft tissue mass. The patient's CT-PET skull base to mid-thigh imaging study demonstrated findings consistent with extensive bony metastatic disease. Hypermetabolic lesions in the liver consistent with metastasis were are also noted. The left posterior iliac bone lesion biopsy demonstrates a metastatic carcinoma showing tumor cells with round to oval hyperchromatic nuclei, indistinct nucleoli and moderately abundant surrounding eosinophilic to clear cytoplasm. The tumor cells form cohesive sheets. Scattered mitotic figures  are seen. No glandular formation is seen. The immunohistochemical stains  demonstrate positive staining of the tumor cells with keratin AE1/AE3,  CK7 and GATA3. The morphologic appearance of the metastatic carcinoma along with the positive GATA3 immunohistochemical stain, is suggestive for a metastatic urothelial carcinoma. Immunohistochemical stains for uroplakin 2 and thrombomodulin (markers for urothelium) will be performed on the core biopsy tissue block, and the results will be  reported in the final report to follow. The H&E and immunohistochemical  stained slides are reviewed with Dr. Medel with agreement on the  interpretation. There are adequate tumor cells in tissue block A1 for  PD-L1 immunohistochemistry if requested. There are adequate tumor cells in tissue block A1 for molecular studies if requested.         Assessment/Plan:       1. 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 [facility]     Plan  1.  Patient seen for bone lesions, status post PET/CT concerning for metastatic bony lesions throughout.  He is status post biopsy of the left posterior iliac bone noting preliminary pathology consistent with urothelial carcinoma with keratin AE1/AE3, CK7 and RUT-3 positive.  Currently further stains are pending and will be available by Monday, March 23 per pathology.  I spoke to the patient and his son today with regards to the biopsy results and informed them of the likely metastatic urothelial carcinoma.  Discussed with the patient the importance of being seen by specialist, medical oncology, to discuss his options on treatment.  After further discussion we will proceed with a referral to cancer care specialist for further evaluation and discussion on treatment options and decision on whether patient is a candidate to proceed with any treatment at this time.  I did confirm with the patient today that I will contact his son  once the final pathology report is received.    Patient with significant debility and weakness.  He has fallen multiple times in the last 2 weeks.  First fall did require ER visit with multiple imaging completed noting the osseous metastatic disease.  Most recent fall was last night at 2 AM and where patient was unable to get to the phone to call for help for approximately 4 hours.  Patient son currently living approximately 2 and half hours away and here today for the office visit.  When he arrived he found his father on the couch and was informed of his fall.  Based on sons evaluation of patient's current living condition patient is unable to live alone and is in need of assisted living immediately.  I did personally speak with patient's primary care provider as well and is aware of the current situation.  Per son they have filled out advanced directives however we do not have any on file and son is attempting to get medical records of previous filled out advanced directives and will provide to medical providers once obtained.  Son will remain with his father until he is in a safe environment and has assistance whether that be in his home or an assisted living facility or within a healthcare organization as patient is unable to care for himself at this time.      Referral to [facility] has been generated and there is no further follow-up needed with the [facility].

## 2020-03-15 PROBLEM — C79.10 METASTATIC UROTHELIAL CARCINOMA (HCC): Status: ACTIVE | Noted: 2020-01-01

## 2020-03-16 NOTE — PROGRESS NOTES
Mini was transferred by Paty at , Mini called as he stated he is going to a SNF, he stated that TB test is required. I scheduled an appt for a tb test. Mini requested I contact his son and [facility].   Per Claudia at [facility] they are taking new patients. Son did speak with [facility] as they have made decisions on SNF.

## 2020-03-16 NOTE — TELEPHONE ENCOUNTER
Contacted patient's son, Deacon with final pathology report from pathology stating it is consistent with metastatic urothelial carcinoma.  Patient is referred to [facility] and awaiting consultation.    After meeting with patient last Thursday son was concerned about patient living alone and I did recommend potentially bringing the patient to the emergency department for assistance.  However once patient was home with the son he did get him some food and he stated he became very lucid and was mentally clear.  He requested to not go to the emergency department or skilled facility until the next day.  Patient did very well and was able to care for himself overnight.  Patient son Deacon was here until the next day and did end up taking patient to [facility] in [place] and they did do an evaluation.  According to the son patient is a moderate risk and he was sent home with a lanyard to wear around his neck in case he needs to call for help.  Son did bring him back home and prepped his home.  He had given his father a list of things to do and he stated that he had completed approximately 75% of that list by today thus far.  Does appear that there still attempted to get him into a skilled facility but this time he does appear to be more safe in his home.  Referral has been placed to [facility] and they are awaiting a telephone call to schedule that appointment for his newly diagnosed metastatic urothelial carcinoma.    Son was appreciative of the phone call.  There is no further follow-up needed with [facility].

## 2020-03-17 NOTE — PROGRESS NOTES
Subjective:      Alvaro Metcalf Jr. is a 74 y.o. male who presents with Paperwork        HPI    The patient is here for follow up of chronic medical problems listed below. He is also here to get paperwork completed.    Peripheral polyneuropathy  Chronic in nature and stable. Patient is on the same management. No reports of any new associated symptoms or concerns regarding this condition at this time.     Patient needs to get paperwork completed for assisted living. He denies any acute medical complaints at this time. He mentions that he has stage 4 prostate cancer. Denies any new associated symptoms. States he will be seeing an oncologist.      Patient Active Problem List   Diagnosis   • Chronic neck pain- S/P cervical spinal fusion- followed by pain mgt- dr white   • Other cerebral palsy (HCC)   • S/P cervical spinal fusion   • Empty sella syndrome (HCC)   • Hypogonadism male- no rx   • Mixed hyperlipidemia   • Primary insomnia   • Spondylosis of cervical region without myelopathy or radiculopathy   • IFG (impaired fasting glucose)   • Vitamin D deficiency   • Smoking greater than 30 pack years (2/3 pack a day for 52 yrs= 34 pk yrs)   • Primary osteoarthritis involving multiple joints   • Tobacco dependence   • Encounter for screening for lung cancer   • Benign essential tremor- ref neuro; trial  beta blockers; r/o parkinsons   • Loss of balance   • Risk for falls   • Anxiety   • Peripheral neuritis (HCC)- left leg   • Wart of scalp   • Dementia due to another general medical condition, without behavioral disturbance (HCC)   • Idiopathic progressive neuropathy   • Bone metastases (HCC)- CT 2020- positive bone bx for  carcinoma 3/11/2020; further stains/tests pend for ?urothelial carcinoma   • Bilateral pulmonary metastases (HCC)   • Atherosclerosis of abdominal aorta (HCC)- CT 2020   • Metastatic urothelial carcinoma (HCC)- bone bx 3/9/2020   • Elevated liver enzymes- NORMAL LIVER AND PANCREASE - FEB, CT  2020   • Diverticulosis- COLONOSCOPY 2019   • Cancer with unknown primary site (HCC)- METS ON CT LUNGS AND BONES 2020, AND MRI SKULL 2020- PSA, SPEP PEND       Outpatient Medications Prior to Visit   Medication Sig Dispense Refill   • atorvastatin (LIPITOR) 40 MG Tab Take 1 Tab by mouth every 48 hours. 90 Tab 4   • amitriptyline (ELAVIL) 50 MG Tab Take 1 Tab by mouth every bedtime. 90 Tab 3   • pregabalin (LYRICA) 75 MG Cap Take 1 Cap by mouth 3 times a day. 90 Cap 11   • Fish Oil-Krill Oil (OMEGA-3 DUAL SPECTRUM PO) Take  by mouth.     • lysine 500 MG Tab Take 500 mg by mouth every day.     • Misc Natural Products (GLUCOSAMINE CHOND MSM FORMULA PO) Take  by mouth.     • Multiple Vitamins-Minerals (CENTRUM SILVER PO) Take  by mouth.     • meloxicam (MOBIC) 15 MG tablet Take 15 mg by mouth every day.     • zolpidem (AMBIEN) 10 MG Tab      • methocarbamol (ROBAXIN) 750 MG TABS Take 1 Tab by mouth 3 times a day. 90 Tab 3   • lorazepam (ATIVAN) 1 MG TABS Take 1 Tab by mouth 2 Times a Day. One bid. 60 Tab 3   • aspirin (ASA) 325 MG TABS Take 325 mg by mouth every day.     • HYDROcodone/acetaminophen (NORCO)  MG Tab Take 1 Tab by mouth every 8 hours as needed for Severe Pain for up to 30 days. May fill on or after 7/23/2015. (Patient not taking: Reported on 3/2/2020) 90 Tab 0     No facility-administered medications prior to visit.         Allergies   Allergen Reactions   • Tetanus Toxoid Anaphylaxis       Review of Systems   Constitutional: Negative.    HENT: Negative.    Eyes: Negative.    Respiratory: Negative.    Cardiovascular: Negative.    Gastrointestinal: Negative.    Genitourinary: Negative.    Musculoskeletal: Negative.    Skin: Negative.    Neurological: Negative.    Endo/Heme/Allergies: Negative.    Psychiatric/Behavioral: Negative.    All other systems reviewed and are negative.       Objective:     /70 (BP Location: Left arm, Patient Position: Sitting, BP Cuff Size: Adult)   Pulse 93   Temp  "36.6 °C (97.8 °F) (Temporal)   Ht 1.88 m (6' 2\")   Wt 63.8 kg (140 lb 9.6 oz)   SpO2 95%   BMI 18.05 kg/m²     Physical Exam  Constitutional: Alert, no distress, well-groomed.  Skin: Warm, dry, good turgor, no rashes in visible areas.  Eye: Equal, round and reactive, conjunctiva clear, lids normal.  ENMT: Lips without lesions, good dentition, moist mucous membranes.  Neck: Trachea midline, no masses, no thyromegaly.  Respiratory: Unlabored respiratory effort, no cough.  Abdomen: Soft, no gross masses.  MSK: Normal gait, moves all extremities.  Neuro: Grossly non-focal. No cranial nerve deficit. Strength and sensation intact.   Psych: Alert and oriented x3, normal affect and mood.   Nursing note and vitals reviewed.      Lab Results   Component Value Date/Time    HBA1C 6.3 (H) 01/16/2020 11:29 AM    HBA1C 6.1 (H) 01/07/2020 01:36 PM     Lab Results   Component Value Date/Time    SODIUM 141 02/17/2020 02:00 PM    POTASSIUM 4.0 02/17/2020 02:00 PM    CHLORIDE 104 02/17/2020 02:00 PM    CO2 29 02/17/2020 02:00 PM    GLUCOSE 217 (H) 02/17/2020 02:00 PM    BUN 26 (H) 02/17/2020 02:00 PM    CREATININE 1.12 02/17/2020 02:00 PM    CREATININE 1.17 03/07/2011 12:00 AM    BUNCREATRAT 19 03/07/2011 12:00 AM    GLOMRATE >59 03/07/2011 12:00 AM    ALKPHOSPHAT 185 (H) 02/17/2020 02:00 PM    ASTSGOT 48 (H) 02/17/2020 02:00 PM    ALTSGPT 51 (H) 02/17/2020 02:00 PM    TBILIRUBIN 0.5 02/17/2020 02:00 PM     Lab Results   Component Value Date/Time    INR 1.10 03/09/2020 01:00 PM    INR 1.02 05/04/2010 02:28 PM    INR 1.06 11/25/2009 05:45 AM     Lab Results   Component Value Date/Time    CHOLSTRLTOT 108 01/16/2020 11:29 AM    LDL 40 01/16/2020 11:29 AM    HDL 47 01/16/2020 11:29 AM    TRIGLYCERIDE 106 01/16/2020 11:29 AM       Lab Results   Component Value Date/Time    TESTOSTERONE 285 (L) 03/05/2014 08:34 AM     No results found for: TSH  Lab Results   Component Value Date/Time    FREET4 0.75 01/07/2020 01:36 PM    FREET4 0.64 " 05/29/2018 02:33 PM     No results found for: URICACID  No components found for: VITB12  Lab Results   Component Value Date/Time    25HYDROXY 41 10/11/2016 11:22 AM    25HYDROXY 36 05/25/2012 09:40 AM          Assessment/Plan:     1.Administrative encounter- clearance and paper work for asst living to [place]    Paperwork completed.  Patient is cleared to be admitted to assisted living.  QuantiFERON gold blood test negative ruling out active TB.      2. Metastatic urothelial carcinoma (HCC)- bone bx 3/9/2020; PET/CT shows multiple pulmonary and diffuse bony metastases  Patient's pain under reasonably good control.  Has appointment with cancer care specialist for further management in the near future.      3.Chronic neck pain- S/P cervical spinal fusion- followed by pain mgt- dr white-under good control.  Continue same regimen.    4.Loss of balance-stable.  Ambulating without assistance or use of walker or wheelchair.  However gait is slightly unsteady and may require use of walker near future.    5.IFG (impaired fasting glucose)-under good control.  Continue same regimen.  No medication for now.  Well-balanced diet with high-protein low-carb.    6.Idiopathic progressive neuropathy-under good control.  Continue Lyrica.    7.Other cerebral palsy (HCC)-this is mild.  No other treatment needed or follow-up with neurology.  Tolerating his restrictions and spasticity fairly well.    8.Atherosclerosis of abdominal aorta (HCC)- CT 2020-stable.  No progression.  Continue with statin therapy    9. Peripheral polyneuropathy-stable.  Continue Lyrica.  Continue with pain management.- Patient has been stable with current management. We will make no changes for now.     10.  Primary osteoarthritis involving multiple joints  Stable.  Tylenol as needed.    11.Mixed hyperlipidemia-stable.  Continue with good nutrition.  Continue Lipitor.    12.Primary osteoarthritis involving multiple joints  Under good control. Continue same  regimen.     13. Primary insomnia  Good control.  Continue Ambien as needed    14. Bony metastasis (HCC)- Metastatic urothelial carcinoma  New diagnosis.  Per cancer care specialist.  Appointment pending.  15. Bilateral pulmonary metastases (HCC)-metastatic urothelial carcinoma  -As above    16. Benign essential tremor- ref neuro; trial  beta blockers-Under good control. Continue same regimen.      17. Anxiety-under good control on current regimen.  However may require additional medication to manage this.            Completed patient's paperwork for assisted living-scanned into chart.     IHiren (Scribe), am scribing for, and in the presence of, Chris Hinkle M.D..    Electronically signed by: Hiren Castillo (Marcosibe), 3/17/2020    IChris M.D., personally performed the services described in this documentation, as scribed by Hiren Castillo in my presence, and it is both accurate and complete.                           40 minute face-to-face encounter took place today.  More than half of this time was spent in the coordination of care of the above problems, as well as counseling.

## 2020-03-17 NOTE — LETTER
March 17, 2020        Alvaro Kelly Dixon Jr.  [address]  [facility] [address]    Current Outpatient Medications   Medication Sig Dispense Refill   • atorvastatin (LIPITOR) 40 MG Tab Take 1 Tab by mouth every 48 hours. 90 Tab 4   • amitriptyline (ELAVIL) 50 MG Tab Take 1 Tab by mouth every bedtime. 90 Tab 3   • pregabalin (LYRICA) 75 MG Cap Take 1 Cap by mouth 3 times a day. 90 Cap 11   • Fish Oil-Krill Oil (OMEGA-3 DUAL SPECTRUM PO) Take  by mouth.     • lysine 500 MG Tab Take 500 mg by mouth every day.     • Misc Natural Products (GLUCOSAMINE CHOND MSM FORMULA PO) Take  by mouth.     • Multiple Vitamins-Minerals (CENTRUM SILVER PO) Take  by mouth.     • meloxicam (MOBIC) 15 MG tablet Take 15 mg by mouth every day.     • zolpidem (AMBIEN) 10 MG Tab      • methocarbamol (ROBAXIN) 750 MG TABS Take 1 Tab by mouth 3 times a day. 90 Tab 3   • lorazepam (ATIVAN) 1 MG TABS Take 1 Tab by mouth 2 Times a Day. One bid. 60 Tab 3   • aspirin (ASA) 325 MG TABS Take 325 mg by mouth every day.     • HYDROcodone/acetaminophen (NORCO)  MG Tab Take 1 Tab by mouth every 8 hours as needed for Severe Pain for up to 30 days. May fill on or after 7/23/2015. (Patient not taking: Reported on 3/2/2020) 90 Tab 0     No current facility-administered medications for this visit.               If you have any questions or concerns, please don't hesitate to call.        Sincerely,        Chris Hinkle M.D.    Electronically Signed

## 2020-03-18 PROBLEM — C79.51 BONE METASTASES: Status: RESOLVED | Noted: 2020-01-01 | Resolved: 2020-01-01

## 2020-03-18 PROBLEM — F02.80: Status: RESOLVED | Noted: 2020-01-01 | Resolved: 2020-01-01

## 2020-03-18 PROBLEM — Z12.2 ENCOUNTER FOR SCREENING FOR LUNG CANCER: Status: RESOLVED | Noted: 2017-10-10 | Resolved: 2020-01-01

## 2020-03-18 PROBLEM — R74.8 ELEVATED LIVER ENZYMES: Status: RESOLVED | Noted: 2020-01-01 | Resolved: 2020-01-01

## 2020-03-18 PROBLEM — Z02.9 ADMINISTRATIVE ENCOUNTER: Status: ACTIVE | Noted: 2020-01-01

## 2020-03-18 PROBLEM — C79.51 BONY METASTASIS: Status: ACTIVE | Noted: 2020-01-01

## 2020-03-18 PROBLEM — C80.1 CANCER WITH UNKNOWN PRIMARY SITE (HCC): Status: RESOLVED | Noted: 2020-01-01 | Resolved: 2020-01-01

## 2020-03-18 NOTE — PROGRESS NOTES
QuantiFERON gold test for tuberculosis screening is negative.  Copy of result placed on medical assistant's desk.  Ready to be faxed.  Order for patient pickup.  Please call [place] and let them know that the forms are now complete.

## 2020-03-18 NOTE — PROGRESS NOTES
QuantiFERON gold test for tuberculosis screening is negative.  Copy of result placed on medical assistant's desk.  Ready to be faxed.  Order for patient pickup.  Please call [place] and let them know that the forms are now complete.

## 2020-03-19 NOTE — TELEPHONE ENCOUNTER
Phone Number Called: [phone] (home)       Call outcome: Did not leave a detailed message. Requested patient to call back.    Message: 1st attempt       I wanted to inform pt that I have faxed over his Physician/healthcare Provider Plan of Care to [facility] [email or web address] and have received a complete confirmation for this fax.

## 2020-03-27 NOTE — TELEPHONE ENCOUNTER
----- Message from Chris Hinkle M.D. sent at 3/18/2020  4:46 PM PDT -----  Your QuantiFERON gold test for tuberculosis screening is negative.  Copy of result placed on medical assistant's desk.  Ready to be faxed.  Order for patient pickup.  Please call [place] and let them know that the forms are now complete.

## 2020-03-27 NOTE — TELEPHONE ENCOUNTER
Phone Number Called: [phone] (home)     Call outcome: Did not leave a detailed message. Requested patient to call back.    Message: 1st attempt LVM to call back

## 2022-01-01 NOTE — PROCEDURE: REASSURANCE
Detail Level: Detailed
Include Location In Plan?: No
Detail Level: Generalized
Detail Level: Zone
Include Location In Plan?: Yes
katelyn

## 2024-06-12 NOTE — ED NOTES
NURSING HOME ORDERS    06/12/2024  [facility]  MARGARET SILVA - TELEMETRY STEPDOWN  [address]  [facility] [address]  Dept: [phone]  Loc: [phone]     Admit to Nursing Home:  [facility]*    Diagnoses:  Active Hospital Problems    Diagnosis  POA    *Acute cystitis with hematuria [N30.01]  Yes    Vulvovaginal candidiasis [B37.31]  No    Irritant contact dermatitis due friction or contact with other specified body fluids [L24.A9]  No    Debility [R53.81]  Yes    Complication of vascular dialysis catheter [T82.9XXA]  No    Moderate malnutrition [E44.0]  Yes    Constipation [K59.00]  No    Prolonged QT interval [R94.31]  Yes    ESRD (end stage renal disease) on dialysis [N18.6, Z99.2]  Not Applicable    Spastic hemiplegia of right dominant side as late effect of cerebrovascular disease [I69.951]  Not Applicable    Status post tracheostomy [Z93.0]  Not Applicable    PEG (percutaneous endoscopic gastrostomy) status [Z93.1]  Not Applicable    Acute encephalopathy [G93.40]  Yes    Type 2 diabetes mellitus with hyperglycemia, with long-term current use of insulin [E11.65, Z79.4]  Not Applicable    Hyponatremia [E87.1]  Yes     POA, Na      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    Activities:   Activity as tolerated    Goals of Care Treatment Preferences:  Code Status: Full Code          What is most important right now is to focus on curative/life-prolongation (regardless of treatment burdens).  Accordingly, we have decided that the best  Patient returned from CT scan via [facility].   "plan to meet the patient's goals includes continuing with treatment.      Labs:  CBC, CMP daily    Nursing Precautions:  Aspiration  and Pressure ulcer prevention    Consults:   PT to evaluate and treat 3  times a week, OT to evaluate and treat 3 times a week, Wound Care, and Nutrition to evaluate and recommend diet. SLP to evaluate and treat.     Miscellaneous Care: PEG Care:  Clean site every 24 hours                   Diabetes Care:  SN to perform and educate Diabetic management with blood glucose monitoring:      Medications: Discontinue all previous medication orders, if any. See new list below.     Medication List        START taking these medications      aspirin 81 MG Chew  1 tablet (81 mg total) by Per G Tube route once daily.     atorvastatin 40 MG tablet  Commonly known as: LIPITOR  1 tablet (40 mg total) by Per G Tube route once daily.     insulin aspart U-100 100 unit/mL (3 mL) Inpn pen  Commonly known as: NovoLOG  Inject 0-5 Units into the skin every 6 (six) hours as needed (Hyperglycemia). **LOW CORRECTION DOSE**  Blood Glucose  mg/dL                         151-200                0 unit  201-250                2 units  251-300                3 units  301-350                4 units  >350                     5 units  Administer subcutaneously if needed at times designated by monitoring schedule.   DO NOT HOLD correction dose insulin for patients who are  NPO.  "HIGH ALERT MEDICATION" - Administer with meals or TF/TPN.  Replaces: insulin aspart U-100 100 unit/mL injection     insulin glargine U-100 (Lantus) 100 unit/mL (3 mL) Inpn pen  Inject 10 Units into the skin every evening.     metoprolol tartrate 25 MG tablet  Commonly known as: LOPRESSOR  1 tablet (25 mg total) by Per G Tube route 2 (two) times daily.            CONTINUE taking these medications      PANTOPRAZOLE ORAL  40 mg once daily. Liquid via gtube     VITAMIN C 500 MG tablet  Generic drug: ascorbic acid (vitamin C)  500 mg by Per G Tube " route 2 (two) times daily.            STOP taking these medications      amLODIPine 10 MG tablet  Commonly known as: NORVASC     carvediloL 25 MG tablet  Commonly known as: COREG     chlorhexidine 0.12 % solution  Commonly known as: PERIDEX     heparin (porcine) 5,000 unit/mL injection     insulin aspart U-100 100 unit/mL injection  Commonly known as: NovoLOG  Replaced by: insulin aspart U-100 100 unit/mL (3 mL) Inpn pen     insulin detemir U-100 100 unit/mL injection  Commonly known as: Levemir     psyllium Powd  Commonly known as: KONSYL     sevelamer carbonate 2.4 gram Pwpk  Commonly known as: RENVELA     zinc sulfate 50 mg zinc (220 mg) capsule  Commonly known as: ZINCATE                Immunizations Administered as of 6/12/2024       No immunizations on file.            Some patients may experience side effects after vaccination.  These may include fever, headache, muscle or joint aches.  Most symptoms resolve with 24-48 hours and do not require urgent medical evaluation unless they persist for more than 72 hours or symptoms are concerning for an unrelated medical condition.          _________________________________  Steven Miranda MD  06/12/2024